# Patient Record
Sex: MALE | Race: BLACK OR AFRICAN AMERICAN | Employment: UNEMPLOYED | URBAN - METROPOLITAN AREA
[De-identification: names, ages, dates, MRNs, and addresses within clinical notes are randomized per-mention and may not be internally consistent; named-entity substitution may affect disease eponyms.]

---

## 2017-06-20 ENCOUNTER — APPOINTMENT (OUTPATIENT)
Dept: CT IMAGING | Age: 32
End: 2017-06-20
Attending: STUDENT IN AN ORGANIZED HEALTH CARE EDUCATION/TRAINING PROGRAM
Payer: MEDICAID

## 2017-06-20 ENCOUNTER — HOSPITAL ENCOUNTER (OUTPATIENT)
Age: 32
Setting detail: OBSERVATION
Discharge: HOME | End: 2017-06-22
Attending: STUDENT IN AN ORGANIZED HEALTH CARE EDUCATION/TRAINING PROGRAM | Admitting: FAMILY MEDICINE
Payer: MEDICAID

## 2017-06-20 DIAGNOSIS — N13.30 HYDRONEPHROSIS, UNSPECIFIED HYDRONEPHROSIS TYPE: ICD-10-CM

## 2017-06-20 DIAGNOSIS — R10.84 ABDOMINAL PAIN, GENERALIZED: Primary | ICD-10-CM

## 2017-06-20 DIAGNOSIS — R03.0 ELEVATED BLOOD PRESSURE READING: ICD-10-CM

## 2017-06-20 PROBLEM — I10 HYPERTENSION: Chronic | Status: ACTIVE | Noted: 2017-06-20

## 2017-06-20 PROBLEM — E23.2 DIABETES INSIPIDUS (HCC): Chronic | Status: ACTIVE | Noted: 2017-06-20

## 2017-06-20 PROBLEM — R10.9 ABDOMINAL PAIN: Status: ACTIVE | Noted: 2017-06-20

## 2017-06-20 LAB
ALBUMIN SERPL BCP-MCNC: 4.6 G/DL (ref 3.5–5)
ALBUMIN/GLOB SERPL: 1.1 {RATIO} (ref 1.1–2.2)
ALP SERPL-CCNC: 89 U/L (ref 45–117)
ALT SERPL-CCNC: 29 U/L (ref 12–78)
AMPHET UR QL SCN: NEGATIVE
ANION GAP BLD CALC-SCNC: 8 MMOL/L (ref 5–15)
APPEARANCE UR: ABNORMAL
AST SERPL W P-5'-P-CCNC: 26 U/L (ref 15–37)
BACTERIA URNS QL MICRO: NEGATIVE /HPF
BARBITURATES UR QL SCN: NEGATIVE
BASOPHILS # BLD AUTO: 0 K/UL (ref 0–0.1)
BASOPHILS # BLD: 0 % (ref 0–1)
BENZODIAZ UR QL: NEGATIVE
BILIRUB SERPL-MCNC: 0.5 MG/DL (ref 0.2–1)
BILIRUB UR QL: NEGATIVE
BUN SERPL-MCNC: 10 MG/DL (ref 6–20)
BUN/CREAT SERPL: 9 (ref 12–20)
CALCIUM SERPL-MCNC: 9.6 MG/DL (ref 8.5–10.1)
CANNABINOIDS UR QL SCN: NEGATIVE
CHLORIDE SERPL-SCNC: 113 MMOL/L (ref 97–108)
CO2 SERPL-SCNC: 27 MMOL/L (ref 21–32)
COCAINE UR QL SCN: NEGATIVE
COLOR UR: ABNORMAL
CREAT SERPL-MCNC: 1.15 MG/DL (ref 0.7–1.3)
DRUG SCRN COMMENT,DRGCM: NORMAL
EOSINOPHIL # BLD: 0 K/UL (ref 0–0.4)
EOSINOPHIL NFR BLD: 1 % (ref 0–7)
EPITH CASTS URNS QL MICRO: ABNORMAL /LPF
ERYTHROCYTE [DISTWIDTH] IN BLOOD BY AUTOMATED COUNT: 13.4 % (ref 11.5–14.5)
GLOBULIN SER CALC-MCNC: 4.3 G/DL (ref 2–4)
GLUCOSE BLD STRIP.AUTO-MCNC: 119 MG/DL (ref 65–100)
GLUCOSE SERPL-MCNC: 113 MG/DL (ref 65–100)
GLUCOSE UR STRIP.AUTO-MCNC: NEGATIVE MG/DL
HCT VFR BLD AUTO: 49.4 % (ref 36.6–50.3)
HGB BLD-MCNC: 17 G/DL (ref 12.1–17)
HGB UR QL STRIP: NEGATIVE
KETONES UR QL STRIP.AUTO: NEGATIVE MG/DL
LACTATE SERPL-SCNC: 1.4 MMOL/L (ref 0.4–2)
LEUKOCYTE ESTERASE UR QL STRIP.AUTO: NEGATIVE
LIPASE SERPL-CCNC: 239 U/L (ref 73–393)
LYMPHOCYTES # BLD AUTO: 20 % (ref 12–49)
LYMPHOCYTES # BLD: 1.3 K/UL (ref 0.8–3.5)
MCH RBC QN AUTO: 31.3 PG (ref 26–34)
MCHC RBC AUTO-ENTMCNC: 34.4 G/DL (ref 30–36.5)
MCV RBC AUTO: 91 FL (ref 80–99)
METHADONE UR QL: NEGATIVE
MONOCYTES # BLD: 0.2 K/UL (ref 0–1)
MONOCYTES NFR BLD AUTO: 3 % (ref 5–13)
NEUTS SEG # BLD: 5.1 K/UL (ref 1.8–8)
NEUTS SEG NFR BLD AUTO: 76 % (ref 32–75)
NITRITE UR QL STRIP.AUTO: NEGATIVE
OPIATES UR QL: NEGATIVE
PCP UR QL: NEGATIVE
PH UR STRIP: 7 [PH] (ref 5–8)
PLATELET # BLD AUTO: 281 K/UL (ref 150–400)
POTASSIUM SERPL-SCNC: 3.3 MMOL/L (ref 3.5–5.1)
PROT SERPL-MCNC: 8.9 G/DL (ref 6.4–8.2)
PROT UR STRIP-MCNC: NEGATIVE MG/DL
RBC # BLD AUTO: 5.43 M/UL (ref 4.1–5.7)
RBC #/AREA URNS HPF: ABNORMAL /HPF (ref 0–5)
SERVICE CMNT-IMP: ABNORMAL
SODIUM SERPL-SCNC: 148 MMOL/L (ref 136–145)
SP GR UR REFRACTOMETRY: 1 (ref 1–1.03)
UROBILINOGEN UR QL STRIP.AUTO: 0.2 EU/DL (ref 0.2–1)
WBC # BLD AUTO: 6.6 K/UL (ref 4.1–11.1)
WBC URNS QL MICRO: ABNORMAL /HPF (ref 0–4)

## 2017-06-20 PROCEDURE — 85025 COMPLETE CBC W/AUTO DIFF WBC: CPT | Performed by: STUDENT IN AN ORGANIZED HEALTH CARE EDUCATION/TRAINING PROGRAM

## 2017-06-20 PROCEDURE — 96376 TX/PRO/DX INJ SAME DRUG ADON: CPT

## 2017-06-20 PROCEDURE — 96375 TX/PRO/DX INJ NEW DRUG ADDON: CPT

## 2017-06-20 PROCEDURE — 99218 HC RM OBSERVATION: CPT

## 2017-06-20 PROCEDURE — 81003 URINALYSIS AUTO W/O SCOPE: CPT | Performed by: STUDENT IN AN ORGANIZED HEALTH CARE EDUCATION/TRAINING PROGRAM

## 2017-06-20 PROCEDURE — 83605 ASSAY OF LACTIC ACID: CPT | Performed by: STUDENT IN AN ORGANIZED HEALTH CARE EDUCATION/TRAINING PROGRAM

## 2017-06-20 PROCEDURE — 83690 ASSAY OF LIPASE: CPT | Performed by: STUDENT IN AN ORGANIZED HEALTH CARE EDUCATION/TRAINING PROGRAM

## 2017-06-20 PROCEDURE — 74011636320 HC RX REV CODE- 636/320

## 2017-06-20 PROCEDURE — 96361 HYDRATE IV INFUSION ADD-ON: CPT

## 2017-06-20 PROCEDURE — 80307 DRUG TEST PRSMV CHEM ANLYZR: CPT | Performed by: STUDENT IN AN ORGANIZED HEALTH CARE EDUCATION/TRAINING PROGRAM

## 2017-06-20 PROCEDURE — 80053 COMPREHEN METABOLIC PANEL: CPT | Performed by: STUDENT IN AN ORGANIZED HEALTH CARE EDUCATION/TRAINING PROGRAM

## 2017-06-20 PROCEDURE — 74011000250 HC RX REV CODE- 250: Performed by: NURSE PRACTITIONER

## 2017-06-20 PROCEDURE — 36415 COLL VENOUS BLD VENIPUNCTURE: CPT | Performed by: STUDENT IN AN ORGANIZED HEALTH CARE EDUCATION/TRAINING PROGRAM

## 2017-06-20 PROCEDURE — 99285 EMERGENCY DEPT VISIT HI MDM: CPT

## 2017-06-20 PROCEDURE — 74174 CTA ABD&PLVS W/CONTRAST: CPT

## 2017-06-20 PROCEDURE — 51798 US URINE CAPACITY MEASURE: CPT

## 2017-06-20 PROCEDURE — 82962 GLUCOSE BLOOD TEST: CPT

## 2017-06-20 PROCEDURE — 74011250636 HC RX REV CODE- 250/636: Performed by: FAMILY MEDICINE

## 2017-06-20 PROCEDURE — 96374 THER/PROPH/DIAG INJ IV PUSH: CPT

## 2017-06-20 PROCEDURE — 74011250637 HC RX REV CODE- 250/637: Performed by: FAMILY MEDICINE

## 2017-06-20 PROCEDURE — 74011250636 HC RX REV CODE- 250/636: Performed by: STUDENT IN AN ORGANIZED HEALTH CARE EDUCATION/TRAINING PROGRAM

## 2017-06-20 RX ORDER — ALPRAZOLAM 0.25 MG/1
0.25 TABLET ORAL
COMMUNITY
End: 2017-12-04

## 2017-06-20 RX ORDER — HYDRALAZINE HYDROCHLORIDE 20 MG/ML
10 INJECTION INTRAMUSCULAR; INTRAVENOUS
Status: DISCONTINUED | OUTPATIENT
Start: 2017-06-20 | End: 2017-06-22 | Stop reason: HOSPADM

## 2017-06-20 RX ORDER — POTASSIUM CHLORIDE 750 MG/1
20 TABLET, FILM COATED, EXTENDED RELEASE ORAL
Status: ACTIVE | OUTPATIENT
Start: 2017-06-20 | End: 2017-06-21

## 2017-06-20 RX ORDER — DESMOPRESSIN ACETATE 0.1 MG/1
0.1 TABLET ORAL DAILY
COMMUNITY
End: 2017-12-04

## 2017-06-20 RX ORDER — DESMOPRESSIN ACETATE 0.1 MG/1
0.1 TABLET ORAL DAILY
Status: DISCONTINUED | OUTPATIENT
Start: 2017-06-21 | End: 2017-06-22 | Stop reason: HOSPADM

## 2017-06-20 RX ORDER — SODIUM CHLORIDE 0.9 % (FLUSH) 0.9 %
10 SYRINGE (ML) INJECTION
Status: ACTIVE | OUTPATIENT
Start: 2017-06-20 | End: 2017-06-21

## 2017-06-20 RX ORDER — ONDANSETRON 2 MG/ML
4 INJECTION INTRAMUSCULAR; INTRAVENOUS
Status: DISCONTINUED | OUTPATIENT
Start: 2017-06-20 | End: 2017-06-22 | Stop reason: HOSPADM

## 2017-06-20 RX ORDER — DIPHENHYDRAMINE HYDROCHLORIDE 50 MG/ML
12.5 INJECTION, SOLUTION INTRAMUSCULAR; INTRAVENOUS
Status: DISCONTINUED | OUTPATIENT
Start: 2017-06-20 | End: 2017-06-22 | Stop reason: HOSPADM

## 2017-06-20 RX ORDER — HYDROMORPHONE HYDROCHLORIDE 1 MG/ML
2 INJECTION, SOLUTION INTRAMUSCULAR; INTRAVENOUS; SUBCUTANEOUS ONCE
Status: COMPLETED | OUTPATIENT
Start: 2017-06-20 | End: 2017-06-20

## 2017-06-20 RX ORDER — HYDROMORPHONE HYDROCHLORIDE 1 MG/ML
0.5 INJECTION, SOLUTION INTRAMUSCULAR; INTRAVENOUS; SUBCUTANEOUS
Status: DISCONTINUED | OUTPATIENT
Start: 2017-06-20 | End: 2017-06-21

## 2017-06-20 RX ORDER — ALBUTEROL SULFATE 90 UG/1
2 AEROSOL, METERED RESPIRATORY (INHALATION)
COMMUNITY

## 2017-06-20 RX ORDER — METOPROLOL TARTRATE 5 MG/5ML
5 INJECTION INTRAVENOUS
Status: DISCONTINUED | OUTPATIENT
Start: 2017-06-20 | End: 2017-06-22 | Stop reason: HOSPADM

## 2017-06-20 RX ORDER — DOXAZOSIN 2 MG/1
2 TABLET ORAL
Status: DISCONTINUED | OUTPATIENT
Start: 2017-06-20 | End: 2017-06-22

## 2017-06-20 RX ORDER — ALPRAZOLAM 0.25 MG/1
0.25 TABLET ORAL
Status: DISCONTINUED | OUTPATIENT
Start: 2017-06-20 | End: 2017-06-22 | Stop reason: HOSPADM

## 2017-06-20 RX ORDER — METOPROLOL SUCCINATE 100 MG/1
100 TABLET, EXTENDED RELEASE ORAL DAILY
COMMUNITY

## 2017-06-20 RX ORDER — ONDANSETRON 4 MG/1
8 TABLET, ORALLY DISINTEGRATING ORAL
Status: DISCONTINUED | OUTPATIENT
Start: 2017-06-20 | End: 2017-06-22 | Stop reason: HOSPADM

## 2017-06-20 RX ORDER — DOXAZOSIN 2 MG/1
2 TABLET ORAL
COMMUNITY
End: 2017-12-04

## 2017-06-20 RX ORDER — HYDROCODONE BITARTRATE AND ACETAMINOPHEN 5; 325 MG/1; MG/1
1 TABLET ORAL
Status: DISCONTINUED | OUTPATIENT
Start: 2017-06-20 | End: 2017-06-22 | Stop reason: HOSPADM

## 2017-06-20 RX ORDER — ALBUTEROL SULFATE 0.83 MG/ML
2.5 SOLUTION RESPIRATORY (INHALATION)
Status: DISCONTINUED | OUTPATIENT
Start: 2017-06-20 | End: 2017-06-22 | Stop reason: HOSPADM

## 2017-06-20 RX ORDER — AMLODIPINE BESYLATE 10 MG/1
10 TABLET ORAL DAILY
COMMUNITY

## 2017-06-20 RX ORDER — ALBUTEROL SULFATE 90 UG/1
2 AEROSOL, METERED RESPIRATORY (INHALATION)
Status: DISCONTINUED | OUTPATIENT
Start: 2017-06-20 | End: 2017-06-20 | Stop reason: SDUPTHER

## 2017-06-20 RX ORDER — ACETAMINOPHEN 325 MG/1
650 TABLET ORAL
Status: DISCONTINUED | OUTPATIENT
Start: 2017-06-20 | End: 2017-06-22 | Stop reason: HOSPADM

## 2017-06-20 RX ORDER — ONDANSETRON 8 MG/1
8 TABLET, ORALLY DISINTEGRATING ORAL
COMMUNITY
End: 2017-12-04

## 2017-06-20 RX ORDER — HYDROCODONE BITARTRATE AND ACETAMINOPHEN 5; 325 MG/1; MG/1
1 TABLET ORAL
Status: DISCONTINUED | OUTPATIENT
Start: 2017-06-20 | End: 2017-06-20 | Stop reason: SDUPTHER

## 2017-06-20 RX ORDER — SODIUM CHLORIDE 0.9 % (FLUSH) 0.9 %
5-10 SYRINGE (ML) INJECTION EVERY 8 HOURS
Status: DISCONTINUED | OUTPATIENT
Start: 2017-06-20 | End: 2017-06-22 | Stop reason: HOSPADM

## 2017-06-20 RX ORDER — ADHESIVE BANDAGE
30 BANDAGE TOPICAL DAILY PRN
Status: DISCONTINUED | OUTPATIENT
Start: 2017-06-20 | End: 2017-06-22 | Stop reason: HOSPADM

## 2017-06-20 RX ORDER — AMLODIPINE BESYLATE 5 MG/1
10 TABLET ORAL DAILY
Status: DISCONTINUED | OUTPATIENT
Start: 2017-06-21 | End: 2017-06-22 | Stop reason: HOSPADM

## 2017-06-20 RX ORDER — HYDROCODONE BITARTRATE AND ACETAMINOPHEN 5; 325 MG/1; MG/1
1 TABLET ORAL
COMMUNITY
End: 2017-12-04

## 2017-06-20 RX ORDER — SODIUM CHLORIDE 0.9 % (FLUSH) 0.9 %
5-10 SYRINGE (ML) INJECTION AS NEEDED
Status: DISCONTINUED | OUTPATIENT
Start: 2017-06-20 | End: 2017-06-22 | Stop reason: HOSPADM

## 2017-06-20 RX ORDER — METOPROLOL SUCCINATE 50 MG/1
100 TABLET, EXTENDED RELEASE ORAL DAILY
Status: DISCONTINUED | OUTPATIENT
Start: 2017-06-21 | End: 2017-06-22 | Stop reason: HOSPADM

## 2017-06-20 RX ADMIN — HYDROMORPHONE HYDROCHLORIDE 0.5 MG: 1 INJECTION, SOLUTION INTRAMUSCULAR; INTRAVENOUS; SUBCUTANEOUS at 19:50

## 2017-06-20 RX ADMIN — DOXAZOSIN 2 MG: 2 TABLET ORAL at 22:26

## 2017-06-20 RX ADMIN — HYDROMORPHONE HYDROCHLORIDE 2 MG: 1 INJECTION, SOLUTION INTRAMUSCULAR; INTRAVENOUS; SUBCUTANEOUS at 12:29

## 2017-06-20 RX ADMIN — METOPROLOL TARTRATE 5 MG: 5 INJECTION INTRAVENOUS at 22:28

## 2017-06-20 RX ADMIN — Medication 10 ML: at 22:00

## 2017-06-20 RX ADMIN — SODIUM CHLORIDE 1000 ML: 900 INJECTION, SOLUTION INTRAVENOUS at 12:19

## 2017-06-20 RX ADMIN — ONDANSETRON 4 MG: 2 INJECTION INTRAMUSCULAR; INTRAVENOUS at 22:28

## 2017-06-20 RX ADMIN — HYDROCODONE BITARTRATE AND ACETAMINOPHEN 1 TABLET: 5; 325 TABLET ORAL at 22:25

## 2017-06-20 RX ADMIN — HYDRALAZINE HYDROCHLORIDE 10 MG: 20 INJECTION INTRAMUSCULAR; INTRAVENOUS at 19:50

## 2017-06-20 RX ADMIN — ALPRAZOLAM 0.25 MG: 0.25 TABLET ORAL at 22:25

## 2017-06-20 RX ADMIN — HYDROMORPHONE HYDROCHLORIDE 2 MG: 1 INJECTION, SOLUTION INTRAMUSCULAR; INTRAVENOUS; SUBCUTANEOUS at 14:09

## 2017-06-20 NOTE — PROGRESS NOTES
Admission Medication Reconciliation:    Information obtained from: [facility], [facility], [facility] (visit 6/19/17)    Spoke with pharmacy staff at [facility], [facility], and [facility] [place].  Added doxazosin, Toprol XL, desmopressin, albuterol, amlodipine, diphenhydramine, alprazolam  Removed methocarbamol and naproxen. Significant PMH/Disease States:   Past Medical History:   Diagnosis Date    Diabetes ([address]     Hypertension        Chief Complaint for this Admission:  abdominal pain    Allergies:  Review of patient's allergies indicates no known allergies. Prior to Admission Medications:   Prior to Admission Medications   Prescriptions Last Dose Informant Patient Reported? Taking? ALPRAZolam (XANAX) 0.25 mg tablet   Yes Yes   Sig: Take 0.25 mg by mouth three (3) times daily as needed for Anxiety. HYDROcodone-acetaminophen (NORCO) 5-325 mg per tablet   Yes Yes   Sig: Take 1 Tab by mouth every six (6) hours as needed for Pain. albuterol (PROVENTIL HFA, VENTOLIN HFA, PROAIR HFA) 90 mcg/actuation inhaler   Yes Yes   Sig: Take 2 Puffs by inhalation every six (6) hours as needed for Wheezing or Shortness of Breath. amLODIPine (NORVASC) 10 mg tablet   Yes Yes   Sig: Take 10 mg by mouth daily. desmopressin (DDAVP) 0.1 mg tablet   Yes Yes   Sig: Take 0.1 mg by mouth daily. doxazosin (CARDURA) 2 mg tablet   Yes Yes   Sig: Take 2 mg by mouth nightly. metoprolol succinate (TOPROL XL) 100 mg tablet   Yes Yes   Sig: Take 100 mg by mouth daily. ondansetron (ZOFRAN ODT) 8 mg disintegrating tablet   Yes Yes   Sig: Take 8 mg by mouth every eight (8) hours as needed for Nausea. Facility-Administered Medications: None       Comments/Recommendations: Medication review attempted with patient.   He did not have medication names but suggested calling his pharmacy or [facility] hospital.  Spoke with pharmacy staff at [facility], [facility], and [facility] [place].  Added doxazosin, Toprol XL, desmopressin, albuterol, amlodipine, diphenhydramine, alprazolam  Removed methocarbamol and naproxen.

## 2017-06-20 NOTE — ED PROVIDER NOTES
HPI Comments: 36 y.o. male with past medical history significant for diabetes and HTN who presents via EMS with chief complaint of abdominal pain. Pt states that he has had upper abdominal pain for the last 4 days. He has been seen at [facility], and [facility] for the same complaint and their findings were negative. Pt states that none of them did an abdominal CT. Pt has been nauseous. He denies any change in BM, change in urine, vomiting, or abdominal surgery. Pt has been taking Lortab with minimal relief. Pt states he had similar pain like this 1 year ago but not as extreme. There are no other acute medical concerns at this time. Social hx: Pt denies drug use, tobacco use, alcohol use  PCP: Karina Sanchez MD    Note written by Pepe Bland, as dictated by Kenya Mulligan MD 12:21 PM      The history is provided by the patient. No  was used. Past Medical History:   Diagnosis Date    Diabetes ([address]     Hypertension        History reviewed. No pertinent surgical history. History reviewed. No pertinent family history. Social History     Social History    Marital status: SINGLE     Spouse name: N/A    Number of children: N/A    Years of education: N/A     Occupational History    Not on file. Social History Main Topics    Smoking status: Current Some Day Smoker    Smokeless tobacco: Not on file    Alcohol use No    Drug use: No    Sexual activity: Not on file     Other Topics Concern    Not on file     Social History Narrative         ALLERGIES: Review of patient's allergies indicates no known allergies. Review of Systems   Gastrointestinal: Positive for abdominal pain and nausea. Negative for constipation and vomiting. Genitourinary: Negative for difficulty urinating. All other systems reviewed and are negative.       Vitals:    06/20/17 1208   BP: (!) 190/119   Pulse: 73   Resp: 16   Temp: 98.8 °F (37.1 °C)   SpO2: 99%   Weight: 83.9 kg (185 lb)   Height: 5' 9\" (1.753 m)            Physical Exam   Constitutional: He is oriented to person, place, and time. He appears well-developed. He appears distressed. Pt writhing in pain   HENT:   Head: Normocephalic and atraumatic. Eyes: Conjunctivae and EOM are normal.   Neck: Normal range of motion. Neck supple. Cardiovascular: Normal rate, regular rhythm and normal heart sounds. No murmur heard. Pulmonary/Chest: Effort normal and breath sounds normal. No respiratory distress. Abdominal: Soft. Bowel sounds are normal. He exhibits no distension. There is no rebound and no guarding. Mild diffuse abdominal tenderness   Musculoskeletal: Normal range of motion. He exhibits no edema or tenderness. Neurological: He is alert and oriented to person, place, and time. No cranial nerve deficit. He exhibits normal muscle tone. Coordination normal.   Skin: Skin is warm and dry. Nursing note and vitals reviewed.    Note written by Pepe Rincon, as dictated by Benita Rao MD 12:32 PM      MDM  Number of Diagnoses or Management Options  Abdominal pain, generalized:      Amount and/or Complexity of Data Reviewed  Clinical lab tests: ordered and reviewed  Tests in the radiology section of CPT®: ordered    Risk of Complications, Morbidity, and/or Mortality  Presenting problems: moderate  Diagnostic procedures: moderate  Management options: moderate    Patient Progress  Patient progress: stable    ED Course       Procedures

## 2017-06-20 NOTE — ED NOTES
Pt at [address]    1409  Pt sent back to room from CT r/t poor IV access. 18 G in R AC flushed without difficulty and positive blood return noted. CT called for pt pickup.

## 2017-06-20 NOTE — H&P
Sidney Rice MD  Please call  and page for questions. Call physician on-call through the  7pm-7am      History & Physical    Primary Care Provider: Luis Alfredo Puga MD  Source of Information: Patient and his medical records. History of Presenting Illness:   Keny Maldonado is a 36 y.o. male with the PMH of HTN, diabetes insipidus, who came to the ED today with abdominal pain. Patient said it has been going on for four days. Patient was also nauseated and started vomiting this AM. It is being hard for the patient to put don anything due to nausea and vomiting. He had normal regular BM yesterday. He has been seen at [facility], [address] [place] [address], and [facility] for the same complaint and their findings were negative. He said he never had this much of pain before though. He was told that his right kidney is not working. He denied change in urine and adominal surgery. He said he makes a lot of urine. He took Lortab with minimal relief. The patient denies any fever, chills, chest pain, cough, congestion, recent illness, palpitations, or dysuria. He denied headache, vision problems, weakness. He denied dizziness and lightheadedness. Review of Systems:  A comprehensive review of systems was negative except for that written in the History of Present Illness. Past Medical History:   Diagnosis Date    Diabetes ([address]     Hypertension       History reviewed. No pertinent surgical history. Prior to Admission medications    Medication Sig Start Date End Date Taking? Authorizing Provider   methocarbamol (ROBAXIN) 500 mg tablet Take 1 Tab by mouth four (4) times daily. 10/22/16   Maite Roger NP   naproxen (NAPROSYN) 375 mg tablet Take 1 Tab by mouth two (2) times daily (with meals). 10/22/16   Maite Roger NP     No Known Allergies   History reviewed. No pertinent family history.      SOCIAL HISTORY:  Patient resides:  Independently X   Assisted Living    SNF    With family care       Smoking history:   None    Former    Chronic 1 or 2 cigerettes daily. Alcohol history:   None    Social X   Chronic      Ambulates:   Independently X   w/cane    w/walker    w/wc    CODE STATUS:  DNR    Full X   Other      Objective:     Physical Exam:     Visit Vitals    BP (!) 190/97 (BP 1 Location: Left arm, BP Patient Position: Sitting)    Pulse 99    Temp 97.8 °F (36.6 °C)    Resp 14    Ht 5' 9\" (1.753 m)    Wt 83.9 kg (185 lb)    SpO2 99%    BMI 27.32 kg/m2      O2 Device: Room air    General:  Alert, cooperative, no distress, appears stated age. Head:  Normocephalic, without obvious abnormality, atraumatic. Eyes:  Conjunctivae/corneas clear. PERRL, EOMs intact. Throat: Lips, mucosa, and tongue normal. Teeth and gums normal.   Neck: Supple, symmetrical, trachea midline, no adenopathy, thyroid: no enlargement/tenderness/nodules, no carotid bruit and no JVD. Back:   Symmetric, no curvature. ROM normal. No CVA tenderness. Lungs:   Clear to auscultation bilaterally. Heart:  Regular rate and rhythm, S1, S2 normal, no murmur. Abdomen:   Soft, non-tender. Bowel sounds normal.   Extremities: Extremities normal, atraumatic, no cyanosis or edema. Pulses: 2+ and symmetric all extremities. Skin: Skin color, texture, turgor normal. No rashes or lesions   Neurologic: CNII-XII intact. Data Review: CT report and lab. Recent Days:  Recent Labs      06/20/17   1230   WBC  6.6   HGB  17.0   HCT  49.4   PLT  281     Recent Labs      06/20/17   1230   NA  148*   K  3.3*   CL  113*   CO2  27   GLU  113*   BUN  10   CREA  1.15   CA  9.6   ALB  4.6   SGOT  26   ALT  29     No results for input(s): PH, PCO2, PO2, HCO3, FIO2 in the last 72 hours.     24 Hour Results:  Recent Results (from the past 24 hour(s))   CBC WITH AUTOMATED DIFF    Collection Time: 06/20/17 12:30 PM   Result Value Ref Range    WBC 6.6 4.1 - 11.1 K/uL    RBC 5.43 4.10 - 5.70 M/uL    HGB 17.0 12.1 - 17.0 g/dL    HCT 49.4 36.6 - 50.3 %    MCV 91.0 80.0 - 99.0 FL    MCH 31.3 26.0 - 34.0 PG    MCHC 34.4 30.0 - 36.5 g/dL    RDW 13.4 11.5 - 14.5 %    PLATELET 631 565 - 696 K/uL    NEUTROPHILS 76 (H) 32 - 75 %    LYMPHOCYTES 20 12 - 49 %    MONOCYTES 3 (L) 5 - 13 %    EOSINOPHILS 1 0 - 7 %    BASOPHILS 0 0 - 1 %    ABS. NEUTROPHILS 5.1 1.8 - 8.0 K/UL    ABS. LYMPHOCYTES 1.3 0.8 - 3.5 K/UL    ABS. MONOCYTES 0.2 0.0 - 1.0 K/UL    ABS. EOSINOPHILS 0.0 0.0 - 0.4 K/UL    ABS. BASOPHILS 0.0 0.0 - 0.1 K/UL   METABOLIC PANEL, COMPREHENSIVE    Collection Time: 06/20/17 12:30 PM   Result Value Ref Range    Sodium 148 (H) 136 - 145 mmol/L    Potassium 3.3 (L) 3.5 - 5.1 mmol/L    Chloride 113 (H) 97 - 108 mmol/L    CO2 27 21 - 32 mmol/L    Anion gap 8 5 - 15 mmol/L    Glucose 113 (H) 65 - 100 mg/dL    BUN 10 6 - 20 MG/DL    Creatinine 1.15 0.70 - 1.30 MG/DL    BUN/Creatinine ratio 9 (L) 12 - 20      GFR est AA >60 >60 ml/min/1.73m2    GFR est non-AA >60 >60 ml/min/1.73m2    Calcium 9.6 8.5 - 10.1 MG/DL    Bilirubin, total 0.5 0.2 - 1.0 MG/DL    ALT (SGPT) 29 12 - 78 U/L    AST (SGOT) 26 15 - 37 U/L    Alk.  phosphatase 89 45 - 117 U/L    Protein, total 8.9 (H) 6.4 - 8.2 g/dL    Albumin 4.6 3.5 - 5.0 g/dL    Globulin 4.3 (H) 2.0 - 4.0 g/dL    A-G Ratio 1.1 1.1 - 2.2     LIPASE    Collection Time: 06/20/17 12:30 PM   Result Value Ref Range    Lipase 239 73 - 393 U/L   URINALYSIS W/ RFLX MICROSCOPIC    Collection Time: 06/20/17 12:30 PM   Result Value Ref Range    Color YELLOW/STRAW      Appearance CLOUDY (A) CLEAR      Specific gravity 1.005 1.003 - 1.030      pH (UA) 7.0 5.0 - 8.0      Protein NEGATIVE  NEG mg/dL    Glucose NEGATIVE  NEG mg/dL    Ketone NEGATIVE  NEG mg/dL    Bilirubin NEGATIVE  NEG      Blood NEGATIVE  NEG      Urobilinogen 0.2 0.2 - 1.0 EU/dL    Nitrites NEGATIVE  NEG      Leukocyte Esterase NEGATIVE  NEG      WBC 0-4 0 - 4 /hpf    RBC 0-5 0 - 5 /hpf    Epithelial cells FEW FEW /lpf    Bacteria NEGATIVE  NEG /hpf   DRUG SCREEN, URINE    Collection Time: 06/20/17 12:30 PM   Result Value Ref Range    AMPHETAMINE NEGATIVE  NEG      BARBITURATES NEGATIVE  NEG      BENZODIAZEPINE NEGATIVE  NEG      COCAINE NEGATIVE  NEG      METHADONE NEGATIVE  NEG      OPIATES NEGATIVE  NEG      PCP(PHENCYCLIDINE) NEGATIVE  NEG      THC (TH-CANNABINOL) NEGATIVE  NEG      Drug screen comment (NOTE)    LACTIC ACID, PLASMA    Collection Time: 06/20/17 12:30 PM   Result Value Ref Range    Lactic acid 1.4 0.4 - 2.0 MMOL/L         Imaging:     Assessment and Plan:       Abdominal pain with vomiting:   Probably due to moderately severe right hydroureteronephrosis with thinning of the  upper pole and bladder distension. Patient is voiding without any problems. We admit the patient for observation to the medical floor and treat symptoms. Patient has the PMH of bad right kidney but he does not know what it is. Patient said he was on Gonzalez cathter for bladder obstruction in the past. We will consult urology and follow. Patient said he went to urologist about 2 years ago. Diabetes insipidus:   Will continue his home medication. I discussed the case with nephrologist. His 6 YO daughter has DI as well. Hypertension: Elevated. Will continue his home medication and add hydralazine as PRN. Electrolytes abnormalities: Will add potassium and follow lab at AM. Mild hypernatremia with DI. See orders for other plans:   VTE prophylaxis: Heparin  Code status: Full  Discussed plan of care with Patient/Family and Nurse   Pre-admission lived at home:   Disposition: To the telemetry for observation. Discharge planning: pending.            Signed By: Griselda Tate MD     June 20, 2017

## 2017-06-20 NOTE — ED TRIAGE NOTES
Pt arrived via EMS from home c/o abd pain since Friday, been seen at [facility] and [facility] with no abnormalities. MCV placed a Gonzalez and it was removed at [facility].

## 2017-06-20 NOTE — ED NOTES
Pt requesting pain medication, MD made aware. 1630  Pt given water by ED staff.     5:19 PM  Dr. Kavita Bowser at bedside.

## 2017-06-20 NOTE — ROUTINE PROCESS
TRANSFER - OUT REPORT:    Verbal report given to Sharon CARMONA ame) on Bertha Steele  being transferred to 67(unit) for routine progression of care       Report consisted of patients Situation, Background, Assessment and   Recommendations(SBAR). Information from the following report(s) SBAR, ED Summary, Intake/Output, MAR and Recent Results was reviewed with the receiving nurse. Lines:   Peripheral IV 06/20/17 Left Hand (Active)   Site Assessment Clean, dry, & intact 6/20/2017 12:20 PM   Phlebitis Assessment 0 6/20/2017 12:20 PM   Infiltration Assessment 0 6/20/2017 12:20 PM   Dressing Status Clean, dry, & intact 6/20/2017 12:20 PM   Dressing Type Transparent 6/20/2017 12:20 PM   Hub Color/Line Status Patent; Flushed 6/20/2017 12:20 PM   Action Taken Blood drawn 6/20/2017 12:20 PM       Peripheral IV 06/20/17 Right Antecubital (Active)        Opportunity for questions and clarification was provided.       Patient transported with:   [employer]

## 2017-06-20 NOTE — IP AVS SNAPSHOT
[address] [facility] 
[phone] Patient: Javier Brown MRN: [id number] :1985 You are allergic to the following No active allergies Recent Documentation Height Weight BMI Smoking Status 1.753 m 89.4 kg 29.09 kg/m2 Current Some Day Smoker Emergency Contacts Name Discharge Info Relation Home Work [facility] DISCHARGE CAREGIVER [3] Girlfriend [18] [phone] About your hospitalization You were admitted on:  2017 You last received care in the:  [facility] 6S NEURO-SCI TELE You were discharged on:  2017 Unit phone number:  [phone] Why you were hospitalized Your primary diagnosis was:  Abdominal Pain Your diagnoses also included:  Hypertension, Diabetes Insipidus (Hcc), Gastritis, Bladder Dysfunction, Hydronephrosis, Right, Hypernatremia Providers Seen During Your Hospitalizations Provider Role Specialty Primary office phone Cheli London MD Attending Provider Emergency Medicine [phone] Rony Colbert MD Attending Provider [facility] [phone] Bonnie Santiago MD Attending Provider Hospitalist [phone] Frann Sandifer, MD Attending Provider Internal Medicine [phone] Your Primary Care Physician (PCP) Primary Care Physician Office Phone Office Fax [facility] [phone] Follow-up Information Follow up With Details Comments Contact Info Stacie Lacey MD In 1 week hospital follow up [address] [place] [address] 
[phone] Larry Mccall MD In 2 weeks GI; hospital follow up  [address] [address] [address] 
[phone] [facility] In 1 week Urology; hospital follow up [address] [facility] [address] 
[phone] Current Discharge Medication List  
  
START taking these medications Dose & Instructions Dispensing Information Comments Morning Noon Evening Bedtime  
 esomeprazole magnesium 20 mg Tbec Commonly known as:  NexIUM 24HR Your last dose was: Your next dose is:    
   
   
 Dose:  20 mg Take 20 mg by mouth daily. Indications: gastritis Quantity:  30 Tab Refills:  0  
     
   
   
   
  
 nicotine 21 mg/24 hr  
Commonly known as:  Jeoffrey Severs Your last dose was: Your next dose is:    
   
   
 Dose:  1 Patch 1 Patch by TransDERmal route daily for 30 days. Indications: SMOKING CESSATION Quantity:  30 Patch Refills:  0 CONTINUE these medications which have NOT CHANGED Dose & Instructions Dispensing Information Comments Morning Noon Evening Bedtime  
 albuterol 90 mcg/actuation inhaler Commonly known as:  PROVENTIL HFA, VENTOLIN HFA, PROAIR HFA Your last dose was: Your next dose is:    
   
   
 Dose:  2 Puff Take 2 Puffs by inhalation every six (6) hours as needed for Wheezing or Shortness of Breath. Refills:  0 ALPRAZolam 0.25 mg tablet Commonly known as:  Lawyer Trotter Your last dose was: Your next dose is:    
   
   
 Dose:  0.25 mg Take 0.25 mg by mouth three (3) times daily as needed for Anxiety. Refills:  0  
     
   
   
   
  
 amLODIPine 10 mg tablet Commonly known as:  Gregorykrista Bruce Your last dose was: Your next dose is:    
   
   
 Dose:  10 mg Take 10 mg by mouth daily. Refills:  0  
     
   
   
   
  
 desmopressin 0.1 mg tablet Commonly known as:  DDAVP Your last dose was: Your next dose is:    
   
   
 Dose:  0.1 mg Take 0.1 mg by mouth daily. Refills:  0  
     
   
   
   
  
 doxazosin 2 mg tablet Commonly known as:  CARDURA Your last dose was: Your next dose is:    
   
   
 Dose:  2 mg Take 2 mg by mouth nightly. Refills:  0 HYDROcodone-acetaminophen 5-325 mg per tablet Commonly known as:  Simmie Blonder Your last dose was: Your next dose is:    
   
   
 Dose:  1 Tab Take 1 Tab by mouth every six (6) hours as needed for Pain. Refills:  0  
     
   
   
   
  
 ondansetron 8 mg disintegrating tablet Commonly known as:  ZOFRAN ODT Your last dose was: Your next dose is:    
   
   
 Dose:  8 mg Take 8 mg by mouth every eight (8) hours as needed for Nausea. Refills:  0  
     
   
   
   
  
 TOPROL  mg tablet Generic drug:  metoprolol succinate Your last dose was: Your next dose is:    
   
   
 Dose:  100 mg Take 100 mg by mouth daily. Refills:  0 Where to Get Your Medications Information on where to get these meds will be given to you by the nurse or doctor. ! Ask your nurse or doctor about these medications  
  esomeprazole magnesium 20 mg Tbec  
 nicotine 21 mg/24 hr  
  
  
 
  
  
Discharge Instructions You need to follow up with Urology regarding kidneys and bladder: [facility] ([phone]) or [address] [place] [address] Urology. ADDITIONAL CARE RECOMMENDATIONS:  
1. Take medication as prescribed. 2. Keep appointment(s) as recommended/scheduled. 3. Your PCP may want to refer you to Psychiatry due to your anxiety. DIET: Cardiac and GERD- appropriate diet ACTIVITY: Activity as tolerated WOUND CARE: none EQUIPMENT needed: none Esomeprazole (By mouth) Esomeprazole ([id number]) Treats gastroesophageal reflux disease (GERD) and a damaged esophagus. Also treats and helps prevent stomach ulcers. This medicine is a proton pump inhibitor (PPI). Brand Name(s): Esomeprazole Strontium, NexIUM, NexIUM 24HR There may be other brand names for this medicine. When This Medicine Should Not Be Used: This medicine is not right for everyone. Do not use it if you had an allergic reaction to esomeprazole or similar medicines. How to Use This Medicine:  
Delayed Release Capsule, Packet · Your doctor will tell you how much medicine to use. Do not use more than directed. Take all of the medicine in your prescription, even if you feel better after a few days. · Follow the instructions on the medicine label if you are using this medicine without a prescription. · Take this medicine at least 1 hour before a meal. 
· Delayed-release capsule: ¨ Swallow the capsule whole. Do not crush or chew it. ¨ If you cannot swallow the capsule, you may use open it and pour the medicine into 1 tablespoon of applesauce. Stir the mixture well and swallow it right away. Do not chew. Throw away any remaining mixture. · Packets:  
¨ 2.5- or 5-milligram (mg) packet: Mix with 1 teaspoon (5 milliliters (mL)) of water and stir well. ¨ 10-, 20-, or 40-mg packet: Mix with 1 tablespoon (15 mL) of water and stir well. ¨ Let the mixture thicken for 2 to 3 minutes. Stir once more, and drink within 30 minutes. Add more water to any leftover medicine and drink it right away. · Feeding tube: ¨ Capsule contents:  
§ Open the capsule and pour the contents into a 60-mL catheter-tipped syringe. Pour 50 mL of water into the syringe. Do not use any other liquids or syringes. § Place the syringe plunger back into the syringe and shake well for 15 seconds. Make sure no medicine is stuck in the tip of the syringe. Inject the medicine into the NG tube right away. § Use water to rinse any leftover medicine through the tube. ¨ Packet contents:  
§ 2.5- or 5-mg packets: Add 5 mL (1 teaspoon) of water into a catheter-tipped syringe. Pour the 2.5- or 5-mg packet into the syringe, shake well right away, and let the medicine thicken for 2 to 3 minutes. § 10-, 20-, or 40-mg packets: Add 15 mL (1 tablespoon) of water into a catheter-tipped syringe. Pour the 10-, 20-, or 40-mg packet into the syringe, shake well right away, and let the medicine thicken for 2 to 3 minutes. § Shake the thickened mixture once more, and inject the medicine into the tube within 30 minutes. § Refill the syringe with the same amount of water that was used in the mixture (5 or 15 mL). Shake it gently, and inject it into the tube to rinse any leftover medicine through the tube. ¨ This medicine should come with a Medication Guide. Ask your pharmacist for a copy if you do not have one. ¨ Missed dose: Take a dose as soon as you remember. If it is almost time for your next dose, wait until then and take a regular dose. Do not take extra medicine to make up for a missed dose. ¨ Store the medicine in a closed container at room temperature, away from heat, moisture, and direct light. Drugs and Foods to Avoid: Ask your doctor or pharmacist before using any other medicine, including over-the-counter medicines, vitamins, and herbal products. · Some foods and medicines can affect how esomeprazole works. Tell your doctor if you are using any of the following: ¨ Atazanavir, cilostazol, diazepam, digoxin, erlotinib, ketoconazole, methotrexate, mycophenolate mofetil, nelfinavir, rifampin, saquinavir, Mike's wort, tacrolimus, voriconazole ¨ Blood thinner (including clopidogrel or warfarin) ¨ Diuretic (water pill) ¨ Iron supplements Warnings While Using This Medicine: · Tell your doctor if you are pregnant or breastfeeding, or if you have kidney disease, liver disease, lupus, or osteoporosis. · This medicine may cause the following problems: ¨ Kidney problems ¨ Broken bones in the hip, wrist, or spine ¨ Low levels of vitamin B12 or magnesium in the blood · This medicine can cause diarrhea. Call your doctor if the diarrhea becomes severe, does not stop, or is bloody. Do not take any medicine to stop diarrhea until you have talked to your doctor. Diarrhea can occur 2 months or more after you stop taking this medicine.  
· Tell any doctor or dentist who treats you that you are using this medicine. This medicine may affect certain medical test results. · Your doctor will check your progress and the effects of this medicine at regular visits. Keep all appointments. · Keep all medicine out of the reach of children. Never share your medicine with anyone. Possible Side Effects While Using This Medicine:  
Call your doctor right away if you notice any of these side effects: · Allergic reaction: Itching or hives, swelling in your face or hands, swelling or tingling in your mouth or throat, chest tightness, trouble breathing · Blistering, peeling, red skin rash · Fast, pounding, or uneven heartbeat · Fever, joint pain, swelling in your body, unusual weight gain, changes in how much or how often you urinate · Joint pain, rash on your cheeks or arms that gets worse in the sun · Seizures, dizziness, muscle cramps, or twitching · Severe diarrhea that does not go away, fever · Stomach pain, nausea, vomiting, weight loss, bloody or black stools If you notice these less serious side effects, talk with your doctor:  
· Headache · Mild diarrhea, constipation, or gas If you notice other side effects that you think are caused by this medicine, tell your doctor. Call your doctor for medical advice about side effects. You may report side effects to FDA at 4-757-FDA-8962 © 2017 [facility] [place][note 2] Information is for End User's use only and may not be sold, redistributed or otherwise used for commercial purposes. The above information is an  only. It is not intended as medical advice for individual conditions or treatments. Talk to your doctor, nurse or pharmacist before following any medical regimen to see if it is safe and effective for you. 
  
 
 
Discharge Instructions Attachments/References GASTRITIS (ENGLISH) Discharge Orders None MyChart Announcement  We are excited to announce that we are making your provider's discharge notes available to you in EduSourced. You will see these notes when they are completed and signed by the physician that discharged you from your recent hospital stay. If you have any questions or concerns about any information you see in EduSourced, please call the Health Information Department where you were seen or reach out to your Primary Care Provider for more information about your plan of care. Introducing [facility]! [facility] introduces EduSourced patient portal. Now you can access parts of your medical record, email your doctor's office, and request medication refills online. 1. In your internet browser, go to [email or web address] 2. Click on the First Time User? Click Here link in the Sign In box. You will see the New Member Sign Up page. 3. Enter your EduSourced Access Code exactly as it appears below. You will not need to use this code after youve completed the sign-up process. If you do not sign up before the expiration date, you must request a new code. · [employer] Access Code: [email or web address] Expires: 9/19/2017  3:14 PM 
 
4. Enter the last four digits of your Social Security Number (xxxx) and Date of Birth (mm/dd/yyyy) as indicated and click Submit. You will be taken to the next sign-up page. 5. Create a EduSourced ID. This will be your EduSourced login ID and cannot be changed, so think of one that is secure and easy to remember. 6. Create a [employer] password. You can change your password at any time. 7. Enter your Password Reset Question and Answer. This can be used at a later time if you forget your password. 8. Enter your e-mail address. You will receive e-mail notification when new information is available in [address] 9. Click Sign Up. You can now view and download portions of your medical record. 10. Click the Download Summary menu link to download a portable copy of your medical information. If you have questions, please visit the Frequently Asked Questions section of the [employer] website. Remember, Fliplife is NOT to be used for urgent needs. For medical emergencies, dial 911. Now available from your iPhone and Android! General Information Please provide this summary of care documentation to your next provider. Patient Signature:  ____________________________________________________________ Date:  ____________________________________________________________  
  
Andres Cons Provider Signature:  ____________________________________________________________ Date:  ____________________________________________________________ More Information Gastritis: Care Instructions Your Care Instructions Gastritis is a sore and upset stomach. It happens when something irritates the stomach lining. Many things can cause it. These include an infection such as the flu or something you ate or drank. Medicines or a sore on the lining of the stomach (ulcer) also can cause it. Your belly may bloat and ache. You may belch, vomit, and feel sick to your stomach. You should be able to relieve the problem by taking medicine. And it may help to change your diet. If gastritis lasts, your doctor may prescribe medicine. Follow-up care is a key part of your treatment and safety. Be sure to make and go to all appointments, and call your doctor if you are having problems. It's also a good idea to know your test results and keep a list of the medicines you take. How can you care for yourself at home? · If your doctor prescribed antibiotics, take them as directed. Do not stop taking them just because you feel better. You need to take the full course of antibiotics. · Be safe with medicines. If your doctor prescribed medicine to decrease stomach acid, take it as directed. Call your doctor if you think you are having a problem with your medicine. · Do not take any other medicine, including over-the-counter pain relievers, without talking to your doctor first. 
· If your doctor recommends over-the-counter medicine to reduce stomach acid, such as Pepcid AC, Prilosec, Tagamet HB, or Zantac 75, follow the directions on the label. · Drink plenty of fluids (enough so that your urine is light yellow or clear like water) to prevent dehydration. Choose water and other caffeine-free clear liquids. If you have kidney, heart, or liver disease and have to limit fluids, talk with your doctor before you increase the amount of fluids you drink. · Limit how much alcohol you drink. · Avoid coffee, tea, cola drinks, chocolate, and other foods with caffeine. They increase stomach acid. When should you call for help? Call 911 anytime you think you may need emergency care. For example, call if: 
· You vomit blood or what looks like coffee grounds. · You pass maroon or very bloody stools. Call your doctor now or seek immediate medical care if: 
· You start breathing fast and have not produced urine in the last 8 hours. · You cannot keep fluids down. Watch closely for changes in your health, and be sure to contact your doctor if: 
· You do not get better as expected. Where can you learn more? Go to http://ramana-tere.info/. Enter [id number] in the search box to learn more about \"Gastritis: Care Instructions. \" Current as of: August 9, 2016 Content Version: 11.3 © 1529-7677 [email or web address]. Care instructions adapted under license by [employer] (which disclaims liability or warranty for this information). If you have questions about a medical condition or this instruction, always ask your healthcare professional. [address] any warranty or liability for your use of this information.

## 2017-06-20 NOTE — IP AVS SNAPSHOT
Current Discharge Medication List  
  
START taking these medications Dose & Instructions Dispensing Information Comments Morning Noon Evening Bedtime  
 esomeprazole magnesium 20 mg Tbec Commonly known as:  NexIUM 24HR Your last dose was: Your next dose is:    
   
   
 Dose:  20 mg Take 20 mg by mouth daily. Indications: gastritis Quantity:  30 Tab Refills:  0  
     
   
   
   
  
 nicotine 21 mg/24 hr  
Commonly known as:  Hank Pulido Your last dose was: Your next dose is:    
   
   
 Dose:  1 Patch 1 Patch by TransDERmal route daily for 30 days. Indications: SMOKING CESSATION Quantity:  30 Patch Refills:  0 CONTINUE these medications which have NOT CHANGED Dose & Instructions Dispensing Information Comments Morning Noon Evening Bedtime  
 albuterol 90 mcg/actuation inhaler Commonly known as:  PROVENTIL HFA, VENTOLIN HFA, PROAIR HFA Your last dose was: Your next dose is:    
   
   
 Dose:  2 Puff Take 2 Puffs by inhalation every six (6) hours as needed for Wheezing or Shortness of Breath. Refills:  0 ALPRAZolam 0.25 mg tablet Commonly known as:  Nacho Ellen Your last dose was: Your next dose is:    
   
   
 Dose:  0.25 mg Take 0.25 mg by mouth three (3) times daily as needed for Anxiety. Refills:  0  
     
   
   
   
  
 amLODIPine 10 mg tablet Commonly known as:  Margarita Talavera Your last dose was: Your next dose is:    
   
   
 Dose:  10 mg Take 10 mg by mouth daily. Refills:  0  
     
   
   
   
  
 desmopressin 0.1 mg tablet Commonly known as:  DDAVP Your last dose was: Your next dose is:    
   
   
 Dose:  0.1 mg Take 0.1 mg by mouth daily. Refills:  0  
     
   
   
   
  
 doxazosin 2 mg tablet Commonly known as:  CARDURA Your last dose was: Your next dose is:    
   
   
 Dose:  2 mg Take 2 mg by mouth nightly. Refills:  0 HYDROcodone-acetaminophen 5-325 mg per tablet Commonly known as:  Pauline Schlichter Your last dose was: Your next dose is:    
   
   
 Dose:  1 Tab Take 1 Tab by mouth every six (6) hours as needed for Pain. Refills:  0  
     
   
   
   
  
 ondansetron 8 mg disintegrating tablet Commonly known as:  ZOFRAN ODT Your last dose was: Your next dose is:    
   
   
 Dose:  8 mg Take 8 mg by mouth every eight (8) hours as needed for Nausea. Refills:  0  
     
   
   
   
  
 TOPROL  mg tablet Generic drug:  metoprolol succinate Your last dose was: Your next dose is:    
   
   
 Dose:  100 mg Take 100 mg by mouth daily. Refills:  0 Where to Get Your Medications Information on where to get these meds will be given to you by the nurse or doctor. !  Ask your nurse or doctor about these medications  
  esomeprazole magnesium 20 mg Tbec  
 nicotine 21 mg/24 hr

## 2017-06-21 ENCOUNTER — APPOINTMENT (OUTPATIENT)
Dept: GENERAL RADIOLOGY | Age: 32
End: 2017-06-21
Attending: HOSPITALIST
Payer: MEDICAID

## 2017-06-21 LAB
ALBUMIN SERPL BCP-MCNC: 4.3 G/DL (ref 3.5–5)
ALBUMIN/GLOB SERPL: 1.3 {RATIO} (ref 1.1–2.2)
ALP SERPL-CCNC: 81 U/L (ref 45–117)
ALT SERPL-CCNC: 26 U/L (ref 12–78)
ANION GAP BLD CALC-SCNC: 8 MMOL/L (ref 5–15)
AST SERPL W P-5'-P-CCNC: 18 U/L (ref 15–37)
BASOPHILS # BLD AUTO: 0 K/UL (ref 0–0.1)
BASOPHILS # BLD: 0 % (ref 0–1)
BILIRUB SERPL-MCNC: 0.4 MG/DL (ref 0.2–1)
BUN SERPL-MCNC: 7 MG/DL (ref 6–20)
BUN/CREAT SERPL: 6 (ref 12–20)
CALCIUM SERPL-MCNC: 9.2 MG/DL (ref 8.5–10.1)
CHLORIDE SERPL-SCNC: 109 MMOL/L (ref 97–108)
CO2 SERPL-SCNC: 29 MMOL/L (ref 21–32)
CREAT SERPL-MCNC: 1.17 MG/DL (ref 0.7–1.3)
EOSINOPHIL # BLD: 0.1 K/UL (ref 0–0.4)
EOSINOPHIL NFR BLD: 1 % (ref 0–7)
ERYTHROCYTE [DISTWIDTH] IN BLOOD BY AUTOMATED COUNT: 13.5 % (ref 11.5–14.5)
GLOBULIN SER CALC-MCNC: 3.3 G/DL (ref 2–4)
GLUCOSE SERPL-MCNC: 91 MG/DL (ref 65–100)
HCT VFR BLD AUTO: 47.1 % (ref 36.6–50.3)
HGB BLD-MCNC: 15.9 G/DL (ref 12.1–17)
LYMPHOCYTES # BLD AUTO: 32 % (ref 12–49)
LYMPHOCYTES # BLD: 2.3 K/UL (ref 0.8–3.5)
MAGNESIUM SERPL-MCNC: 2.3 MG/DL (ref 1.6–2.4)
MCH RBC QN AUTO: 31.5 PG (ref 26–34)
MCHC RBC AUTO-ENTMCNC: 33.8 G/DL (ref 30–36.5)
MCV RBC AUTO: 93.5 FL (ref 80–99)
MONOCYTES # BLD: 0.5 K/UL (ref 0–1)
MONOCYTES NFR BLD AUTO: 8 % (ref 5–13)
NEUTS SEG # BLD: 4.3 K/UL (ref 1.8–8)
NEUTS SEG NFR BLD AUTO: 59 % (ref 32–75)
PHOSPHATE SERPL-MCNC: 4.2 MG/DL (ref 2.6–4.7)
PLATELET # BLD AUTO: 224 K/UL (ref 150–400)
POTASSIUM SERPL-SCNC: 3.5 MMOL/L (ref 3.5–5.1)
PROT SERPL-MCNC: 7.6 G/DL (ref 6.4–8.2)
RBC # BLD AUTO: 5.04 M/UL (ref 4.1–5.7)
SODIUM SERPL-SCNC: 146 MMOL/L (ref 136–145)
WBC # BLD AUTO: 7.2 K/UL (ref 4.1–11.1)

## 2017-06-21 PROCEDURE — 74011250636 HC RX REV CODE- 250/636: Performed by: FAMILY MEDICINE

## 2017-06-21 PROCEDURE — 83735 ASSAY OF MAGNESIUM: CPT | Performed by: FAMILY MEDICINE

## 2017-06-21 PROCEDURE — 74011250637 HC RX REV CODE- 250/637: Performed by: FAMILY MEDICINE

## 2017-06-21 PROCEDURE — 84100 ASSAY OF PHOSPHORUS: CPT | Performed by: FAMILY MEDICINE

## 2017-06-21 PROCEDURE — 96376 TX/PRO/DX INJ SAME DRUG ADON: CPT

## 2017-06-21 PROCEDURE — 99218 HC RM OBSERVATION: CPT

## 2017-06-21 PROCEDURE — 85025 COMPLETE CBC W/AUTO DIFF WBC: CPT | Performed by: FAMILY MEDICINE

## 2017-06-21 PROCEDURE — 74011000250 HC RX REV CODE- 250: Performed by: NURSE PRACTITIONER

## 2017-06-21 PROCEDURE — 74011250637 HC RX REV CODE- 250/637: Performed by: HOSPITALIST

## 2017-06-21 PROCEDURE — 74000 XR ABD PORT  1 V: CPT

## 2017-06-21 PROCEDURE — 80053 COMPREHEN METABOLIC PANEL: CPT | Performed by: FAMILY MEDICINE

## 2017-06-21 PROCEDURE — 74011250636 HC RX REV CODE- 250/636: Performed by: HOSPITALIST

## 2017-06-21 PROCEDURE — 36415 COLL VENOUS BLD VENIPUNCTURE: CPT | Performed by: FAMILY MEDICINE

## 2017-06-21 RX ORDER — HYDROMORPHONE HYDROCHLORIDE 1 MG/ML
1 INJECTION, SOLUTION INTRAMUSCULAR; INTRAVENOUS; SUBCUTANEOUS
Status: DISCONTINUED | OUTPATIENT
Start: 2017-06-21 | End: 2017-06-22 | Stop reason: HOSPADM

## 2017-06-21 RX ORDER — IBUPROFEN 200 MG
1 TABLET ORAL DAILY
Status: DISCONTINUED | OUTPATIENT
Start: 2017-06-21 | End: 2017-06-22 | Stop reason: HOSPADM

## 2017-06-21 RX ADMIN — DESMOPRESSIN ACETATE 100 MCG: 0.1 TABLET ORAL at 11:39

## 2017-06-21 RX ADMIN — HYDROCODONE BITARTRATE AND ACETAMINOPHEN 1 TABLET: 5; 325 TABLET ORAL at 22:36

## 2017-06-21 RX ADMIN — ONDANSETRON 4 MG: 2 INJECTION INTRAMUSCULAR; INTRAVENOUS at 09:30

## 2017-06-21 RX ADMIN — AMLODIPINE BESYLATE 10 MG: 5 TABLET ORAL at 11:39

## 2017-06-21 RX ADMIN — HYDROMORPHONE HYDROCHLORIDE 1 MG: 1 INJECTION, SOLUTION INTRAMUSCULAR; INTRAVENOUS; SUBCUTANEOUS at 14:41

## 2017-06-21 RX ADMIN — DOXAZOSIN 2 MG: 2 TABLET ORAL at 21:43

## 2017-06-21 RX ADMIN — METOPROLOL SUCCINATE 100 MG: 50 TABLET, EXTENDED RELEASE ORAL at 15:07

## 2017-06-21 RX ADMIN — HYDROCODONE BITARTRATE AND ACETAMINOPHEN 1 TABLET: 5; 325 TABLET ORAL at 05:19

## 2017-06-21 RX ADMIN — METOPROLOL TARTRATE 5 MG: 5 INJECTION INTRAVENOUS at 09:29

## 2017-06-21 RX ADMIN — Medication 10 ML: at 14:00

## 2017-06-21 RX ADMIN — Medication 10 ML: at 21:43

## 2017-06-21 RX ADMIN — HYDROMORPHONE HYDROCHLORIDE 0.5 MG: 1 INJECTION, SOLUTION INTRAMUSCULAR; INTRAVENOUS; SUBCUTANEOUS at 00:37

## 2017-06-21 RX ADMIN — HYDROMORPHONE HYDROCHLORIDE 1 MG: 1 INJECTION, SOLUTION INTRAMUSCULAR; INTRAVENOUS; SUBCUTANEOUS at 11:17

## 2017-06-21 RX ADMIN — HYDROCODONE BITARTRATE AND ACETAMINOPHEN 1 TABLET: 5; 325 TABLET ORAL at 13:01

## 2017-06-21 RX ADMIN — Medication 10 ML: at 05:20

## 2017-06-21 RX ADMIN — ALPRAZOLAM 0.25 MG: 0.25 TABLET ORAL at 13:04

## 2017-06-21 RX ADMIN — HYDROMORPHONE HYDROCHLORIDE 0.5 MG: 1 INJECTION, SOLUTION INTRAMUSCULAR; INTRAVENOUS; SUBCUTANEOUS at 09:15

## 2017-06-21 NOTE — CONSULTS
[address] [facility] [address]   [address] [facility]       Name:  Latha Rich   MR#:  [id number]   :  1985   Account #:  [de-identified]    Date of Consultation:  2017   Date of Adm:  2017       DATE OF CONSULTATION: 2017. REASON FOR CONSULTATION: Hydronephrosis and bladder   distention. HISTORY: The patient is a 33-year-old gentleman admitted last night   at [facility] by the hospitalist service. He came to the emergency   room with complaint of upper epigastric pain. He has had this on and   off going back for quite awhile. The patient is a somewhat limited   historian. I did review previous ER visits in the [facility] system and he has   had multiple ER visits and evaluation for similar symptoms and   apparently has a history of a suspected pancreatitis at times. On CT   scan he had a fairly distended looking bladder. There is chronic   appearing right-sided hydronephrosis, as well as mild left-sided   hydronephrosis. He has had a longstanding issue with hydronephrosis   on the right. In addition, he tells me that he is known to have a poorly   functioning bladder. He has been evaluated at [facility]. It was   recommended that he have a Gonzalez catheter or do intermittent   catheterization previously, although he has refused both of those   options. It sounds like his followup with them has been somewhat   sporadic; however, he tells me he was recently there where a Gonzalez   catheter was placed, but then subsequently removed at [place]. He   feels he voids well with a good urinary stream. He reports no flank or   kidney type pain. He was having pain in the epigastric region, which   has resolved since his admission. He has been afebrile. Urinalysis at   time of admission was normal. Kidney function was also normal with a   creatinine of 1.17 this morning. The problem, therefore, is   hydronephrosis location both kidneys, but right more so than the left.    Duration unknown, but right-sided hydronephrosis has been present at   least for the past several years. MODIFYING FACTORS: None. Associated symptoms and signs: Fairly   full looking bladder with a suggestion of voiding dysfunction which   could be contributing to the hydronephrosis. PAST MEDICAL HISTORY: Diabetes and hypertension. SURGERIES: None. SOCIAL HISTORY: He does smoke. He denies drinking or drug use. REVIEW OF SYSTEMS   A 12-point review of systems is negative other than that described   above. PHYSICAL EXAMINATION   VITAL SIGNS: Blood pressure 153/97, heart rate 105. He is afebrile   with a temperature of 98.1. GENERAL: He is awake, alert, oriented, in no acute distress. HEENT: Normocephalic, atraumatic. Pupils equal, round and reactive. Hearing seems normal.   LUNGS: Respiratory effort and breathing are normal. Chest wall is   normal.   ABDOMEN: Soft and benign without any tenderness, organomegaly or   hernia appreciated. BACK: Without CVA tenderness. EXTREMITIES: Without clubbing, cyanosis or edema. LABORATORY DATA: Show a normal urinalysis dated 06/20/2017,   with no blood or suggestion of infection. His creatinine is normal at   1.17. I reviewed the CT scan. There is right-sided hydronephrosis,   which looks chronically with some thinning of the parenchyma. On the   left side is mild hydronephrosis. The bladder looks distended at the   time of CT.    ASSESSMENT AND PLAN: He appears to have underlying bladder   dysfunction. I am not exactly sure what workup he has had before, but   Gonzalez catheter and CIC were recommended to protect his kidney   function. He has refused both options. He feels he is voiding well, but   does appear to be retaining.  I do not see that he needs any urgent   intervention; however, he should probably followup with us to continue   to monitor the kidneys and perhaps go ahead with urodynamics and   further discuss the possibility of a Gonzalez or CIC at some point. He can   followup with us at [facility] or with [facility] as they have followed   him in the past. I have added our contact information to his discharge   instructions. Please call if there are any questions.         Diann Gallegos MD DPM / Ahmad Sandifer   D:  06/21/2017   07:37   T:  06/21/2017   08:39   Job #:  [id number]

## 2017-06-21 NOTE — INTERDISCIPLINARY ROUNDS
IDR/SLIDR Summary          Patient: Malgorzata Walton MRN: [id number]    Age: 36 y.o. YOB: 1985 Room/Bed: [facility]   Admit Diagnosis: Abdominal pain  Principal Diagnosis: Abdominal pain   Goals: EGD  Readmission: NO  Quality Measure: SEPSIS  VTE Prophylaxis: Mechanical  Influenza Vaccine screening completed? YES  Pneumococcal Vaccine screening completed? YES  Mobility needs: No   Nutrition plan:Yes  Consults:Case Management    Financial concerns:Yes  Escalated to CM? YES  RRAT Score: 11   Interventions:  Testing due for pt today?  NO  LOS: 0 days Expected length of stay 3 days  Discharge plan: pending   PCP: Clementine Zuñiga MD  Transportation needs: No    Days before discharge:one day until discharge   Discharge disposition: Home    Signed:     Xander Wallace RN  6/22/17  9:00 AM

## 2017-06-21 NOTE — CONSULTS
Seen and examined  Thanks for the consult  A/P:DI on chronic desmopressin PO        Chronic bladder dysfunction with b/l hydro ,rt>lt ;work up done in another hospital;         Hypernatremia better         Recurrent abd pain  On desmopressin  Monitor sodium,UO  Refusing espana  [place] fluid intake  S.p contrast IV yesterday

## 2017-06-21 NOTE — PROGRESS NOTES
Hospitalist Progress Note      Hospital summary: 23 y.o man with HTN, diabetes insipidus, who presented with epigastric abdominal pain. Work-up revealed bladder distention with right hydroureteronephrosis. Assessment/Plan:  Abdominal pain: (POA) suspect this is due to bladder distention  -recommended espana catheter placement but he is adamantly refusing, citing that is urinating fine  -states he has worsening/new pain today in the epigastrium, will check an acute series now  -increase IV hydromorphone to 1 mg q3h PRN  -will ask GI to see him    R hydronephrosis: (POA)  -refusing espana as above  -continue doxazosin  -urology consulted, f/u as an outpatient    DI w/ hypernatremia: (POA) chronic  -continue home desmopressin  -nephrology consulted    HTN: elevated here likely confounded by pain.  -continue current meds for now and monitor    Code status: full  DVT prophylaxis: SCDs  Disposition: home when appropriate  ----------------------------------------------    CC: abdominal pain    S: reports severe abdominal pain different than on admission, now in the epigastrium non-radiating associated with nausea and no vomiting, no back pain, urinating, no dysuria, no hematuria, no dyspnea or diarrhea. Review of Systems:  Pertinent items are noted in HPI.     O:  Visit Vitals    BP (!) 153/97 (BP 1 Location: Left arm, BP Patient Position: At rest)    Pulse (!) 105    Temp 98.1 °F (36.7 °C)    Resp 19    Ht 5' 9\" (1.753 m)    Wt 90.1 kg (198 lb 10.2 oz)    SpO2 97%    BMI 29.33 kg/m2       PHYSICAL EXAM:  Gen: appears uncomfortable  HEENT: anicteric sclerae, normal conjunctiva, oropharynx clear, MM moist  Neck: supple  Heart: RR, tachycardic, no MRG, no JVD, no peripheral edema  Lungs: CTA b/l, non-labored respirations  Abd: soft, moderately tender in the epigastrium and suprapubic area, bladder is distended and palpable, BS+,   Extr: warm  Skin: dry, no rash  Neuro: CN II-XII grossly intact, normal speech, moves all extremities  Psych: anxious/uncomfortable      Intake/Output Summary (Last 24 hours) at 06/21/17 1110  Last data filed at 06/21/17 0559   Gross per 24 hour   Intake             3000 ml   Output             5900 ml   Net            -2900 ml        Recent labs & imaging reviewed:  Recent Labs      06/21/17   0322  06/20/17   1230   WBC  7.2  6.6   HGB  15.9  17.0   HCT  47.1  49.4   PLT  224  281     Recent Labs      06/21/17   0322  06/20/17   1230   NA  146*  148*   K  3.5  3.3*   CL  109*  113*   CO2  29  27   BUN  7  10   CREA  1.17  1.15   GLU  91  113*   CA  9.2  9.6   MG  2.3   --    PHOS  4.2   --      Recent Labs      06/21/17   0322  06/20/17   1230   SGOT  18  26   ALT  26  29   AP  81  89   TBILI  0.4  0.5   TP  7.6  8.9*   ALB  4.3  4.6   GLOB  3.3  4.3*   LPSE   --   239       Med list reviewed  Current Facility-Administered Medications   Medication Dose Route Frequency    HYDROmorphone (PF) (DILAUDID) injection 1 mg  1 mg IntraVENous Q3H PRN    ALPRAZolam (XANAX) tablet 0.25 mg  0.25 mg Oral TID PRN    amLODIPine (NORVASC) tablet 10 mg  10 mg Oral DAILY    desmopressin (DDAVP) tablet 100 mcg  0.1 mg Oral DAILY    doxazosin (CARDURA) tablet 2 mg  2 mg Oral QHS    metoprolol succinate (TOPROL-XL) XL tablet 100 mg  100 mg Oral DAILY    ondansetron (ZOFRAN ODT) tablet 8 mg  8 mg Oral Q8H PRN    hydrALAZINE (APRESOLINE) 20 mg/mL injection 10 mg  10 mg IntraVENous Q4H PRN    sodium chloride (NS) flush 5-10 mL  5-10 mL IntraVENous Q8H    sodium chloride (NS) flush 5-10 mL  5-10 mL IntraVENous PRN    acetaminophen (TYLENOL) tablet 650 mg  650 mg Oral Q4H PRN    HYDROcodone-acetaminophen (NORCO) 5-325 mg per tablet 1 Tab  1 Tab Oral Q4H PRN    diphenhydrAMINE (BENADRYL) injection 12.5 mg  12.5 mg IntraVENous Q4H PRN    ondansetron (ZOFRAN) injection 4 mg  4 mg IntraVENous Q4H PRN    magnesium hydroxide (MILK OF MAGNESIA) 400 mg/5 mL oral suspension 30 mL 30 mL Oral DAILY PRN    albuterol (PROVENTIL VENTOLIN) nebulizer solution 2.5 mg  2.5 mg Nebulization Q6H PRN    metoprolol (LOPRESSOR) injection 5 mg  5 mg IntraVENous Q6H PRN       Care Plan discussed with:  Patient/Family, Nurse and     Pauline Bingham MD  Internal Medicine  Date of Service: 6/21/2017

## 2017-06-21 NOTE — CONSULTS
[address] [facility]  [address] [facility] [address]  [place], [address]  [phone]                     GI CONSULTATION NOTE  Brittni Byrnes, AGACNP-BC  Work Cell: [phone]      NAME:  Duncan Benavides   :   1985   MRN:   [id number]       Referring Provider: Dr. Sagar Altman Date: 2017     Chief Complaint: Abdominal pain    History of Present Illness:  Patient is a 36 y.o. who is seen in consultation at the request of Dr. David Valdivia for abdominal pain. Mr. Myra Lebron has a PMH as detailed below. He presented to the hospital with abdominal pain. He is a very limited historian and upon asking what brought him to the hospital stated \"I have told you all already. \" Onset of abdominal pain was Friday which progressively worsened. It is located in epigastrium and upper abdomen. It is constant, can radiate to his back and described as sharp. He denies any associated fever, chills, nausea, vomiting, change in bowel habits, black or bloody stools. He reports having similar pain one year ago but cannot recall any further details. He denies any tobacco, illicit drug or alcohol use. He does report prior history of heavy alcohol use 4-5 years ago. He denies any history of ulcers, gallstones or pancreatitis. No prior EGD or colonoscopy. He denies any aspirin or NSAID use. Labs here are essentially unremarkable. LFTs and lipase are normal. CTA demonstrated marked distention of the bladder and bilateral hydroureteropnephrosis. He has been seen by urology and espana placement was recommended which patient refused. Upon review of [facility] records, he has had numerous (5+) ER visits since Friday for the above symptoms. Previous work-up including labs and CT scan were unremarkable. Toxicology screen completed at [facility] was positive for THC. There is some questionable history of alcoholic pancreatitis, although patient denies this.        PMH:  Past Medical History:   Diagnosis Date    Diabetes ([address]     Hypertension        PSH:  History reviewed. No pertinent surgical history. Allergies:  No Known Allergies    Home Medications:  Prior to Admission Medications   Prescriptions Last Dose Informant Patient Reported? Taking? ALPRAZolam (XANAX) 0.25 mg tablet Unknown at Unknown time  Yes No   Sig: Take 0.25 mg by mouth three (3) times daily as needed for Anxiety. HYDROcodone-acetaminophen (NORCO) 5-325 mg per tablet Unknown at Unknown time  Yes No   Sig: Take 1 Tab by mouth every six (6) hours as needed for Pain. albuterol (PROVENTIL HFA, VENTOLIN HFA, PROAIR HFA) 90 mcg/actuation inhaler Unknown at Unknown time  Yes No   Sig: Take 2 Puffs by inhalation every six (6) hours as needed for Wheezing or Shortness of Breath. amLODIPine (NORVASC) 10 mg tablet Unknown at Unknown time  Yes No   Sig: Take 10 mg by mouth daily. desmopressin (DDAVP) 0.1 mg tablet Unknown at Unknown time  Yes No   Sig: Take 0.1 mg by mouth daily. doxazosin (CARDURA) 2 mg tablet Unknown at Unknown time  Yes No   Sig: Take 2 mg by mouth nightly. metoprolol succinate (TOPROL XL) 100 mg tablet Unknown at Unknown time  Yes No   Sig: Take 100 mg by mouth daily. ondansetron (ZOFRAN ODT) 8 mg disintegrating tablet Unknown at Unknown time  Yes No   Sig: Take 8 mg by mouth every eight (8) hours as needed for Nausea.       Facility-Administered Medications: None       Hospital Medications:  Current Facility-Administered Medications   Medication Dose Route Frequency    HYDROmorphone (PF) (DILAUDID) injection 1 mg  1 mg IntraVENous Q3H PRN    ALPRAZolam (XANAX) tablet 0.25 mg  0.25 mg Oral TID PRN    amLODIPine (NORVASC) tablet 10 mg  10 mg Oral DAILY    desmopressin (DDAVP) tablet 100 mcg  0.1 mg Oral DAILY    doxazosin (CARDURA) tablet 2 mg  2 mg Oral QHS    metoprolol succinate (TOPROL-XL) XL tablet 100 mg  100 mg Oral DAILY    ondansetron (ZOFRAN ODT) tablet 8 mg  8 mg Oral Q8H PRN    hydrALAZINE (APRESOLINE) 20 mg/mL injection 10 mg 10 mg IntraVENous Q4H PRN    sodium chloride (NS) flush 5-10 mL  5-10 mL IntraVENous Q8H    sodium chloride (NS) flush 5-10 mL  5-10 mL IntraVENous PRN    acetaminophen (TYLENOL) tablet 650 mg  650 mg Oral Q4H PRN    HYDROcodone-acetaminophen (NORCO) 5-325 mg per tablet 1 Tab  1 Tab Oral Q4H PRN    diphenhydrAMINE (BENADRYL) injection 12.5 mg  12.5 mg IntraVENous Q4H PRN    ondansetron (ZOFRAN) injection 4 mg  4 mg IntraVENous Q4H PRN    magnesium hydroxide (MILK OF MAGNESIA) 400 mg/5 mL oral suspension 30 mL  30 mL Oral DAILY PRN    albuterol (PROVENTIL VENTOLIN) nebulizer solution 2.5 mg  2.5 mg Nebulization Q6H PRN    metoprolol (LOPRESSOR) injection 5 mg  5 mg IntraVENous Q6H PRN       Social History:  Social History   Substance Use Topics    Smoking status: Current Some Day Smoker    Smokeless tobacco: Not on file    Alcohol use No       Family History:  History reviewed. No pertinent family history. Review of Systems:    Constitutional: negative fever, negative chills, negative weight loss  Eyes:   negative visual changes  ENT:   negative sore throat, tongue or lip swelling  Respiratory:  negative cough, negative dyspnea  Cards:  negative for chest pain, palpitations, lower extremity edema  GI:   See HPI  :  negative for frequency, dysuria  Integument:  negative for rash and pruritus  Heme:  negative for easy bruising and gum/nose bleeding  Musculoskel: negative for myalgias, back pain and muscle weakness  Neuro: negative for headaches, dizziness, vertigo  Psych:  negative for feelings of anxiety, depression      Objective:   Patient Vitals for the past 8 hrs:   BP Temp Pulse Resp SpO2   06/21/17 1100 (!) 184/102 - 88 16 97 %   06/21/17 0700 (!) 153/97 98.1 °F (36.7 °C) (!) 105 19 97 %     06/21 0701 - 06/21 1900  In: -   Out: 1400 [Urine:1400]  06/19 1901 - 06/21 0700  In: 3000 [P.O.:3000]  Out: 5900 [Urine:5900]      PHYSICAL EXAM:  General: WD, WN.  Alert, cooperative, no acute distress.    HEENT: NC, Atraumatic. PERRLA, EOMI. Anicteric sclerae. Lungs:  CTA Bilaterally. No Wheezing/Rhonchi/Rales. Heart:  Regular rate and rhythm, No murmur, No Rubs, No Gallops  Abdomen: Soft, non-distended, mild RUQ/epigastric/LUQ tenderness.  +Bowel sounds, no HSM  Extremities: No c/c/e  Neurologic:  Alert and oriented X 3. No acute neurological distress. Psych:   Limited historian. Irritated. Data Review     Recent Labs      06/21/17   0322  06/20/17   1230   WBC  7.2  6.6   HGB  15.9  17.0   HCT  47.1  49.4   PLT  224  281     Recent Labs      06/21/17   0322  06/20/17   1230   NA  146*  148*   K  3.5  3.3*   CL  109*  113*   CO2  29  27   BUN  7  10   CREA  1.17  1.15   GLU  91  113*   PHOS  4.2   --    CA  9.2  9.6     Recent Labs      06/21/17   0322  06/20/17   1230   SGOT  18  26   AP  81  89   TP  7.6  8.9*   ALB  4.3  4.6   GLOB  3.3  4.3*   LPSE   --   239     No results for input(s): INR, PTP, APTT in the last 72 hours. No lab exists for component: INREXT     Imaging studies reviewed    Assessment:   Epigastric pain- with nausea and vomiting per previous reports. Differentials include PUD, erosions, gastritis or esophagitis. Patient Active Problem List   Diagnosis Code    Hypertension I10    Diabetes insip[facility]us [facility] E23.2    Abdominal pain R10.9            Plan:   -Supportive management per primary team  -Start PPI  -NPO after midnight  -Plan for EGD tomorrow  -Further recommendations to follow   -Discussed with Dr. Checo Gutierrez  -Will follow along with you  -Thank you kindly for allowing us to participate in the care of this patient    I have examined the patient. I have reviewed the chart and agree with the documentation recorded by the NP, including the assessment, treatment plan, and disposition. ASSESSMENT AND PLAN:  36 yr old male has presented with epigastric pain and hydronephrosis.  Differential diagnosis includes peptic ulcer, gastritis, esophagitis, etc.  NPO after 5 AM  EGD in AM  IV PPI  Further recommendations after EGD.       Nikolay Munson MD

## 2017-06-21 NOTE — PROGRESS NOTES
Contacted by unit secretary to call Urologist for Stat consult    Spoke with Dr. Trish Rankin - consult may be done in am. Reviewed labs and CT Imaging with him. Also spoke with pts nurse about BP and HR. Placed extra antihypertensive orders in for IV lopressor (already has hydralazine). Pt had taken his am medications (100 mg toprol XL) and norvsc (per pt). Nursing report pt had good response to 0.5 mg dilaudid earlier in evening.

## 2017-06-21 NOTE — CONSULTS
[facility] [address]   [address] [facility]       Name:  Ruth Ann Logan   MR#:  [id number]   :  1985   Account #:  [de-identified]    Date of Consultation:  2017   Date of Adm:  2017       HISTORY FOR CONSULTATION: The patient was seen for a history   of DI and some hydronephrosis. He is a very poor historian. The   patient during the history said he is in pain all the time. He was seen by   urology previously. This is a 72-year-old roberto that was admitted, came   to the ER with epigastric pain. He e had this on and off for a while and   previous visit, he had a history of pancreatitis. He has recommended   for indwelling of a chronic Gonzalez because of some bladder dysfunction,   but refused and is refusing that for urology. His creatinine has been 1.1. He came this time, like we said, for pain. As per chart, and like we   said, the patient is a poor historian. He is known to have diabetes   insipidus not sure for how long, on and off nausea and vomiting and as   far as outpatient medication, he was supposed to be on DDAVP 0.1   mg tablet daily, not sure if he is taking them. Very polyuric. His sodium   is 146. It was 148 on admission and his CAT scan was done, which   showed bilateral hydronephrosis. He has more on the right than on the   left. CT scan was done. It was a CTA that showed severe right   hydronephrosis, thinning of the right kidney and less left   hydronephrosis than the right. Unfortunately, like we said, we have   limited history from him. PAST MEDICAL HISTORY: DI, bladder dysfunction and chronic   abdominal pain. SOCIAL HISTORY: Reviewed. FAMILY HISTORY: Reviewed. MEDICATIONS AS OUTPATIENT: Included: DDAVP p.o. 0.1 daily, not   sure how compliant with his and Cardura among others and Norvasc. MEDICATIONS AS INPATIENT: DDAVP/desmopressin 0.1   daily, Cardura 2 mg, Norvasc 10 mg, not on IV fluid.     PHYSICAL EXAMINATION   GENERAL: Mild distress. He said he is in pain. VITAL SIGNS: Blood pressure 160/97, saturating 97%. I's and O's 3 in,   6 liters output. ABDOMEN: Tender, did not let me touch it. EXTREMITIES: Trace edema bilaterally. LABORATORY DATA: UA: 1005 specific gravity. Hemoglobin 15.9,   platelets 010. Sodium 146, potassium 3.5, BUN 7, creatinine 1.1,   calcium 9.2, phosphorus 4.2, bicarbonate 29, magnesium is 2.3. Albumin is 4.3. IMPRESSION:   1. History of diabetes insipidus on DDAVP. Sodium is a little bit better. Not sure about his compliance with DDAVP tablet. I am not sure how   long he had the diabetes insipidus. 2. Bilateral hydronephrosis with right more than left, with kind of more   cortical atrophy on the right, refusing Gonzalez catheter seen by urology. 3. History of hypertension. 4. Recurrent epigastric pain. RECOMMENDATIONS:   1. Agree with resuming his DDAVP/desmopressin tablet. 2. Will adjust that depending on his sodium level and also his urine   output. 3. [place] fluid intake. 4. Urology seeing him and then he refuses Goznalez catheter, which put   him at risk for having more kidney damage. 5. Status post contrast study yesterday. 6. We will follow. Discussed in length with him. MD Yovanny Landis / Regan Valverde   D:  06/21/2017   09:57   T:  06/21/2017   10:29   Job #:  [id number]     ??

## 2017-06-21 NOTE — PROGRESS NOTES
Bedside and Verbal shift change report given to Nanette Keita RN (oncoming nurse) by Misha Carmona RN (offgoing nurse). Report included the following information SBAR, Kardex, Intake/Output, MAR and Recent Results.

## 2017-06-21 NOTE — DISCHARGE INSTRUCTIONS
You need to follow up with Urology regarding kidneys and bladder: [facility] ([phone]) or [facility]. ADDITIONAL CARE RECOMMENDATIONS:   1. Take medication as prescribed. 2. Keep appointment(s) as recommended/scheduled. 3. Your PCP may want to refer you to Psychiatry due to your anxiety. DIET: Cardiac and GERD- appropriate diet    ACTIVITY: Activity as tolerated    WOUND CARE: none    EQUIPMENT needed: none      Esomeprazole (By mouth)   Esomeprazole ([id number])  Treats gastroesophageal reflux disease (GERD) and a damaged esophagus. Also treats and helps prevent stomach ulcers. This medicine is a proton pump inhibitor (PPI). Brand Name(s): Esomeprazole Strontium, NexIUM, NexIUM 24HR   There may be other brand names for this medicine. When This Medicine Should Not Be Used: This medicine is not right for everyone. Do not use it if you had an allergic reaction to esomeprazole or similar medicines. How to Use This Medicine:   Delayed Release Capsule, Packet  · Your doctor will tell you how much medicine to use. Do not use more than directed. Take all of the medicine in your prescription, even if you feel better after a few days. · Follow the instructions on the medicine label if you are using this medicine without a prescription. · Take this medicine at least 1 hour before a meal.  · Delayed-release capsule:   ¨ Swallow the capsule whole. Do not crush or chew it. ¨ If you cannot swallow the capsule, you may use open it and pour the medicine into 1 tablespoon of applesauce. Stir the mixture well and swallow it right away. Do not chew. Throw away any remaining mixture. · Packets:   ¨ 2.5- or 5-milligram (mg) packet: Mix with 1 teaspoon (5 milliliters (mL)) of water and stir well. ¨ 10-, 20-, or 40-mg packet: Mix with 1 tablespoon (15 mL) of water and stir well. ¨ Let the mixture thicken for 2 to 3 minutes. Stir once more, and drink within 30 minutes.  Add more water to any leftover medicine and drink it right away. · Feeding tube:   ¨ Capsule contents:   § Open the capsule and pour the contents into a 60-mL catheter-tipped syringe. Pour 50 mL of water into the syringe. Do not use any other liquids or syringes. § Place the syringe plunger back into the syringe and shake well for 15 seconds. Make sure no medicine is stuck in the tip of the syringe. Inject the medicine into the NG tube right away. § Use water to rinse any leftover medicine through the tube. ¨ Packet contents:   § 2.5- or 5-mg packets: Add 5 mL (1 teaspoon) of water into a catheter-tipped syringe. Pour the 2.5- or 5-mg packet into the syringe, shake well right away, and let the medicine thicken for 2 to 3 minutes. § 10-, 20-, or 40-mg packets: Add 15 mL (1 tablespoon) of water into a catheter-tipped syringe. Pour the 10-, 20-, or 40-mg packet into the syringe, shake well right away, and let the medicine thicken for 2 to 3 minutes. § Shake the thickened mixture once more, and inject the medicine into the tube within 30 minutes. § Refill the syringe with the same amount of water that was used in the mixture (5 or 15 mL). Shake it gently, and inject it into the tube to rinse any leftover medicine through the tube. ¨ This medicine should come with a Medication Guide. Ask your pharmacist for a copy if you do not have one. ¨ Missed dose: Take a dose as soon as you remember. If it is almost time for your next dose, wait until then and take a regular dose. Do not take extra medicine to make up for a missed dose. ¨ Store the medicine in a closed container at room temperature, away from heat, moisture, and direct light. Drugs and Foods to Avoid:   Ask your doctor or pharmacist before using any other medicine, including over-the-counter medicines, vitamins, and herbal products. · Some foods and medicines can affect how esomeprazole works.  Tell your doctor if you are using any of the following:  ¨ Atazanavir, cilostazol, diazepam, digoxin, erlotinib, ketoconazole, methotrexate, mycophenolate mofetil, nelfinavir, rifampin, saquinavir, Mike's wort, tacrolimus, voriconazole  ¨ Blood thinner (including clopidogrel or warfarin)  ¨ Diuretic (water pill)  ¨ Iron supplements  Warnings While Using This Medicine:   · Tell your doctor if you are pregnant or breastfeeding, or if you have kidney disease, liver disease, lupus, or osteoporosis. · This medicine may cause the following problems:  ¨ Kidney problems  ¨ Broken bones in the hip, wrist, or spine  ¨ Low levels of vitamin B12 or magnesium in the blood  · This medicine can cause diarrhea. Call your doctor if the diarrhea becomes severe, does not stop, or is bloody. Do not take any medicine to stop diarrhea until you have talked to your doctor. Diarrhea can occur 2 months or more after you stop taking this medicine. · Tell any doctor or dentist who treats you that you are using this medicine. This medicine may affect certain medical test results. · Your doctor will check your progress and the effects of this medicine at regular visits. Keep all appointments. · Keep all medicine out of the reach of children. Never share your medicine with anyone.   Possible Side Effects While Using This Medicine:   Call your doctor right away if you notice any of these side effects:  · Allergic reaction: Itching or hives, swelling in your face or hands, swelling or tingling in your mouth or throat, chest tightness, trouble breathing  · Blistering, peeling, red skin rash  · Fast, pounding, or uneven heartbeat  · Fever, joint pain, swelling in your body, unusual weight gain, changes in how much or how often you urinate  · Joint pain, rash on your cheeks or arms that gets worse in the sun  · Seizures, dizziness, muscle cramps, or twitching  · Severe diarrhea that does not go away, fever  · Stomach pain, nausea, vomiting, weight loss, bloody or black stools  If you notice these less serious side effects, talk with your doctor:   · Headache  · Mild diarrhea, constipation, or gas  If you notice other side effects that you think are caused by this medicine, tell your doctor. Call your doctor for medical advice about side effects. You may report side effects to FDA at 4-615-FDA-8049  © 2017 [address] Information is for End User's use only and may not be sold, redistributed or otherwise used for commercial purposes. The above information is an  only. It is not intended as medical advice for individual conditions or treatments.  Talk to your doctor, nurse or pharmacist before following any medical regimen to see if it is safe and effective for you.

## 2017-06-21 NOTE — PROGRESS NOTES
Primary Nurse Grover Drummond RN and Amalia Valdez RN performed a dual skin assessment on this patient No impairment noted  Devon score is 23

## 2017-06-21 NOTE — CONSULTS
Urology Consult    Patient: Saida Fox MRN: [id number]  SSN: [id number]    YOB: 1985  Age: 36 y.o. Sex: male          Date of Consultation:  June 21, 2017  Requesting Physician: Griselda Tate MD  Reason for Consultation:    Pre-existing [facility] Patient:   Physician: Dr. Nay Feliciano is dictated. Assessment/Plan:   No intervention needed. Known to have bladder dysfunction and has refused chronic espana or CIC. He should follow-up with us or [facility]. I added my contact information to D/C instructions.      Signed By: James Ureña MD  - June 21, 2017

## 2017-06-22 ENCOUNTER — ANESTHESIA (OUTPATIENT)
Dept: ENDOSCOPY | Age: 32
End: 2017-06-22
Payer: MEDICAID

## 2017-06-22 ENCOUNTER — ANESTHESIA EVENT (OUTPATIENT)
Dept: ENDOSCOPY | Age: 32
End: 2017-06-22
Payer: MEDICAID

## 2017-06-22 VITALS
WEIGHT: 197 LBS | SYSTOLIC BLOOD PRESSURE: 141 MMHG | TEMPERATURE: 97.9 F | HEART RATE: 88 BPM | RESPIRATION RATE: 16 BRPM | BODY MASS INDEX: 29.18 KG/M2 | OXYGEN SATURATION: 100 % | HEIGHT: 69 IN | DIASTOLIC BLOOD PRESSURE: 77 MMHG

## 2017-06-22 PROBLEM — N13.30 HYDRONEPHROSIS, RIGHT: Status: ACTIVE | Noted: 2017-06-22

## 2017-06-22 PROBLEM — K29.70 GASTRITIS: Status: ACTIVE | Noted: 2017-06-22

## 2017-06-22 PROBLEM — E87.0 HYPERNATREMIA: Status: ACTIVE | Noted: 2017-06-22

## 2017-06-22 PROBLEM — N31.9 BLADDER DYSFUNCTION: Chronic | Status: ACTIVE | Noted: 2017-06-22

## 2017-06-22 LAB
ANION GAP BLD CALC-SCNC: 9 MMOL/L (ref 5–15)
BUN SERPL-MCNC: 7 MG/DL (ref 6–20)
BUN/CREAT SERPL: 7 (ref 12–20)
CALCIUM SERPL-MCNC: 8.9 MG/DL (ref 8.5–10.1)
CHLORIDE SERPL-SCNC: 100 MMOL/L (ref 97–108)
CO2 SERPL-SCNC: 31 MMOL/L (ref 21–32)
CREAT SERPL-MCNC: 1.01 MG/DL (ref 0.7–1.3)
GLUCOSE SERPL-MCNC: 100 MG/DL (ref 65–100)
MAGNESIUM SERPL-MCNC: 1.9 MG/DL (ref 1.6–2.4)
PHOSPHATE SERPL-MCNC: 2.9 MG/DL (ref 2.6–4.7)
POTASSIUM SERPL-SCNC: 2.9 MMOL/L (ref 3.5–5.1)
SODIUM SERPL-SCNC: 140 MMOL/L (ref 136–145)

## 2017-06-22 PROCEDURE — 74011250636 HC RX REV CODE- 250/636: Performed by: HOSPITALIST

## 2017-06-22 PROCEDURE — 74011250636 HC RX REV CODE- 250/636

## 2017-06-22 PROCEDURE — 74011250637 HC RX REV CODE- 250/637: Performed by: INTERNAL MEDICINE

## 2017-06-22 PROCEDURE — 77030032490 HC SLV COMPR SCD KNE COVD -B

## 2017-06-22 PROCEDURE — 74011250636 HC RX REV CODE- 250/636: Performed by: NURSE PRACTITIONER

## 2017-06-22 PROCEDURE — 74011000250 HC RX REV CODE- 250: Performed by: NURSE PRACTITIONER

## 2017-06-22 PROCEDURE — 77030009426 HC FCPS BIOP ENDOSC BSC -B: Performed by: SPECIALIST

## 2017-06-22 PROCEDURE — 96376 TX/PRO/DX INJ SAME DRUG ADON: CPT

## 2017-06-22 PROCEDURE — C9113 INJ PANTOPRAZOLE SODIUM, VIA: HCPCS | Performed by: NURSE PRACTITIONER

## 2017-06-22 PROCEDURE — 74011250637 HC RX REV CODE- 250/637: Performed by: FAMILY MEDICINE

## 2017-06-22 PROCEDURE — 36415 COLL VENOUS BLD VENIPUNCTURE: CPT | Performed by: HOSPITALIST

## 2017-06-22 PROCEDURE — 96375 TX/PRO/DX INJ NEW DRUG ADDON: CPT

## 2017-06-22 PROCEDURE — 80048 BASIC METABOLIC PNL TOTAL CA: CPT | Performed by: HOSPITALIST

## 2017-06-22 PROCEDURE — 99218 HC RM OBSERVATION: CPT

## 2017-06-22 PROCEDURE — 88305 TISSUE EXAM BY PATHOLOGIST: CPT | Performed by: SPECIALIST

## 2017-06-22 PROCEDURE — 74011000250 HC RX REV CODE- 250

## 2017-06-22 PROCEDURE — 84100 ASSAY OF PHOSPHORUS: CPT | Performed by: HOSPITALIST

## 2017-06-22 PROCEDURE — 76060000031 HC ANESTHESIA FIRST 0.5 HR: Performed by: SPECIALIST

## 2017-06-22 PROCEDURE — 76040000019: Performed by: SPECIALIST

## 2017-06-22 PROCEDURE — 83735 ASSAY OF MAGNESIUM: CPT | Performed by: HOSPITALIST

## 2017-06-22 RX ORDER — DOXAZOSIN 2 MG/1
4 TABLET ORAL
Status: DISCONTINUED | OUTPATIENT
Start: 2017-06-22 | End: 2017-06-22 | Stop reason: HOSPADM

## 2017-06-22 RX ORDER — PROPOFOL 10 MG/ML
INJECTION, EMULSION INTRAVENOUS AS NEEDED
Status: DISCONTINUED | OUTPATIENT
Start: 2017-06-22 | End: 2017-06-22 | Stop reason: HOSPADM

## 2017-06-22 RX ORDER — IBUPROFEN 200 MG
1 TABLET ORAL DAILY
Qty: 30 PATCH | Refills: 0 | Status: SHIPPED | OUTPATIENT
Start: 2017-06-22 | End: 2017-07-22

## 2017-06-22 RX ORDER — LIDOCAINE HYDROCHLORIDE 20 MG/ML
INJECTION, SOLUTION EPIDURAL; INFILTRATION; INTRACAUDAL; PERINEURAL AS NEEDED
Status: DISCONTINUED | OUTPATIENT
Start: 2017-06-22 | End: 2017-06-22 | Stop reason: HOSPADM

## 2017-06-22 RX ORDER — FENTANYL CITRATE 50 UG/ML
50 INJECTION, SOLUTION INTRAMUSCULAR; INTRAVENOUS ONCE
Status: DISCONTINUED | OUTPATIENT
Start: 2017-06-22 | End: 2017-06-22 | Stop reason: HOSPADM

## 2017-06-22 RX ORDER — POTASSIUM CHLORIDE 750 MG/1
40 TABLET, FILM COATED, EXTENDED RELEASE ORAL
Status: COMPLETED | OUTPATIENT
Start: 2017-06-22 | End: 2017-06-22

## 2017-06-22 RX ORDER — ESOMEPRAZOLE MAGNESIUM 20 MG/1
20 TABLET, DELAYED RELEASE ORAL DAILY
Qty: 30 TAB | Refills: 0 | Status: SHIPPED | OUTPATIENT
Start: 2017-06-22 | End: 2017-12-04

## 2017-06-22 RX ORDER — SODIUM CHLORIDE 9 MG/ML
INJECTION, SOLUTION INTRAVENOUS
Status: DISCONTINUED | OUTPATIENT
Start: 2017-06-22 | End: 2017-06-22 | Stop reason: HOSPADM

## 2017-06-22 RX ADMIN — PROPOFOL 50 MG: 10 INJECTION, EMULSION INTRAVENOUS at 16:02

## 2017-06-22 RX ADMIN — SODIUM CHLORIDE 40 MG: 9 INJECTION, SOLUTION INTRAMUSCULAR; INTRAVENOUS; SUBCUTANEOUS at 11:13

## 2017-06-22 RX ADMIN — LIDOCAINE HYDROCHLORIDE 40 MG: 20 INJECTION, SOLUTION EPIDURAL; INFILTRATION; INTRACAUDAL; PERINEURAL at 16:00

## 2017-06-22 RX ADMIN — METOPROLOL SUCCINATE 100 MG: 50 TABLET, EXTENDED RELEASE ORAL at 11:12

## 2017-06-22 RX ADMIN — PROPOFOL 50 MG: 10 INJECTION, EMULSION INTRAVENOUS at 16:04

## 2017-06-22 RX ADMIN — POTASSIUM CHLORIDE 40 MEQ: 750 TABLET, FILM COATED, EXTENDED RELEASE ORAL at 17:07

## 2017-06-22 RX ADMIN — ALPRAZOLAM 0.25 MG: 0.25 TABLET ORAL at 03:09

## 2017-06-22 RX ADMIN — DESMOPRESSIN ACETATE 100 MCG: 0.1 TABLET ORAL at 17:07

## 2017-06-22 RX ADMIN — SODIUM CHLORIDE: 9 INJECTION, SOLUTION INTRAVENOUS at 15:50

## 2017-06-22 RX ADMIN — AMLODIPINE BESYLATE 10 MG: 5 TABLET ORAL at 11:12

## 2017-06-22 RX ADMIN — ALPRAZOLAM 0.25 MG: 0.25 TABLET ORAL at 17:07

## 2017-06-22 RX ADMIN — PROPOFOL 50 MG: 10 INJECTION, EMULSION INTRAVENOUS at 16:06

## 2017-06-22 RX ADMIN — HYDROMORPHONE HYDROCHLORIDE 1 MG: 1 INJECTION, SOLUTION INTRAMUSCULAR; INTRAVENOUS; SUBCUTANEOUS at 01:03

## 2017-06-22 RX ADMIN — HYDROMORPHONE HYDROCHLORIDE 1 MG: 1 INJECTION, SOLUTION INTRAMUSCULAR; INTRAVENOUS; SUBCUTANEOUS at 03:32

## 2017-06-22 RX ADMIN — HYDROMORPHONE HYDROCHLORIDE 1 MG: 1 INJECTION, SOLUTION INTRAMUSCULAR; INTRAVENOUS; SUBCUTANEOUS at 08:52

## 2017-06-22 RX ADMIN — PROPOFOL 100 MG: 10 INJECTION, EMULSION INTRAVENOUS at 16:00

## 2017-06-22 RX ADMIN — Medication 10 ML: at 14:00

## 2017-06-22 RX ADMIN — Medication 10 ML: at 06:53

## 2017-06-22 NOTE — PERIOP NOTES
TRANSFER - OUT REPORT:    Verbal report given to Geneva Barreto RN (name) on Steven Chase  being transferred to [facility] [phone] (unit) for routine progression of care       Report consisted of patients Situation, Background, Assessment and   Recommendations(SBAR). Information from the following report(s) Procedure Summary and Recent Results was reviewed with the receiving nurse. Opportunity for questions and clarification was provided.       Patient transported with:   [employer]

## 2017-06-22 NOTE — PERIOP NOTES

## 2017-06-22 NOTE — PROGRESS NOTES
Bedside and Verbal shift change report given to Kendra W Cammie Ovalles (oncoming nurse) by Dann Ly (offgoing nurse). Report included the following information SBAR, Kardex and Recent Results.

## 2017-06-22 NOTE — ROUTINE PROCESS
Bertha Steele  1985  [id number]    Situation:  Verbal report received from: Yuri Callejas  Procedure: Procedure(s):  ESOPHAGOGASTRODUODENOSCOPY (EGD)  ESOPHAGOGASTRODUODENAL (EGD) BIOPSY    Background:    Preoperative diagnosis: Epigastric pain  Postoperative diagnosis: Erosive Gastritis  Pancreatic Crest    :  Dr. Luke Browning  Assistant(s): Endoscopy Technician-1: Radha Tijerina  Endoscopy RN-1: Ela Corral RN    Specimens:   ID Type Source Tests Collected by Time Destination   1 : antrum Preservative Gastric  Julieta Medina MD 6/22/2017 1603 Pathology   2 : pancreatic crest Preservative Gastric  Julieta Medina MD 6/22/2017 1604 Pathology     H. Pylori  no    Assessment:  Intra-procedure medications     Anesthesia gave intra-procedure sedation and medications, see anesthesia flow sheet yes    Intravenous fluids: NS@ KVO     Vital signs stable     Abdominal assessment: round and soft     Recommendation:  Discharge patient per MD order.   Return to floor  Family or Friend   Permission to share finding with family or friend yes

## 2017-06-22 NOTE — ANESTHESIA POSTPROCEDURE EVALUATION
Post-Anesthesia Evaluation and Assessment    Patient: Lindsay Milan MRN: [id number]  SSN: [id number]    YOB: 1985  Age: 36 y.o. Sex: male       Cardiovascular Function/Vital Signs  Visit Vitals    /77    Pulse 94    Temp 36.7 °C (98 °F)    Resp 20    Ht 5' 9\" (1.753 m)    Wt 89.4 kg (197 lb)    SpO2 96%    BMI 29.09 kg/m2       Patient is status post MAC anesthesia for Procedure(s):  ESOPHAGOGASTRODUODENOSCOPY (EGD)  ESOPHAGOGASTRODUODENAL (EGD) BIOPSY. Nausea/Vomiting: None    Postoperative hydration reviewed and adequate. Pain:  Pain Scale 1: Numeric (0 - 10) (06/22/17 1626)  Pain Intensity 1: 0 (06/22/17 1626)   Managed    Neurological Status:   Neuro  Neurologic State: Alert;Irritable (06/22/17 0800)  Orientation Level: Oriented X4 (06/22/17 0800)  Cognition: Appropriate for age attention/concentration; Follows commands; Impulsive (06/22/17 0800)  Speech: Clear (06/22/17 0800)  Assessment L Pupil: Brisk;Round (06/22/17 0800)  Size L Pupil (mm): 3 (06/22/17 0800)  Assessment R Pupil: Brisk;Round (06/22/17 0800)  Size R Pupil (mm): 3 (06/22/17 0800)  LUE Motor Response: Purposeful (06/22/17 0800)  LLE Motor Response: Purposeful (06/22/17 0800)  RUE Motor Response: Purposeful (06/22/17 0800)  RLE Motor Response: Purposeful (06/22/17 0800)   At baseline    Mental Status and Level of Consciousness: Arousable    Pulmonary Status:   O2 Device: Room air (06/22/17 1626)   Adequate oxygenation and airway patent    Complications related to anesthesia: None    Post-anesthesia assessment completed.  No concerns    Signed By: Glenna Roblero MD     June 22, 2017

## 2017-06-22 NOTE — DISCHARGE SUMMARY
Discharge Summary       PATIENT ID: Ladi Varela  MRN: [id number]   YOB: 1985    DATE OF ADMISSION: 2017 12:05 PM    DATE OF DISCHARGE: 17   PRIMARY CARE PROVIDER: Redd Buitrago MD     ATTENDING PHYSICIAN: Will New MD, S  DISCHARGING PROVIDER: Will New MD, S    To contact this individual call [phone] and ask the  to page. If unavailable ask to be transferred the Adult Hospitalist Department. CONSULTATIONS: IP CONSULT TO NEPHROLOGY  IP CONSULT TO UROLOGY  IP CONSULT TO GASTROENTEROLOGY  IP CONSULT TO HOSPITALIST    PROCEDURES/SURGERIES:    EGD with biopsy   CTA abd/pelvis w contrast    ADMITTING DIAGNOSES & HOSPITAL COURSE:   24 yo man with HTN and diabetes insipidus presented from home on 17 with epigastric abdominal pain. Work-up revealed bladder distention with right hydroureteronephrosis. Abdominal pain (POA) suspect this is due to bladder distention and gastritis  - recommended espana catheter placement but he is adamantly refusing, citing that he is urinating fine  - states he has worsening/new pain today in the epigastrium  - acute abd series  unremarkable  - pain control  - GI consulted    Gastritis  - s/p EGD on ; pathology pending  - start PPI  - GERD diet advised     R hydronephrosis: (POA)  -refusing espana as above  -continue doxazosin  -urology consulted, f/u as an outpatient     DI w/ hypernatremia (POA) chronic  -continue home desmopressin  -nephrology consulted     HTN: elevated here likely confounded by pain  -continue current meds for now and monitor  - better controlled now    Anxiety - resumed Xanax; Psychiatr referral outpatient recommended      DISCHARGE DIAGNOSES / PLAN:      Stable for discharge home to self-care. Follow up with PCP, GI, Urology.        PENDING TEST RESULTS:   At the time of discharge the following test results are still pendin/22 biopsy result    FOLLOW UP APPOINTMENTS:    Follow-up Information     Follow up With Details Comments Ginette Mcdonald MD In 1 week hospital follow up [address] Milagros Gallegos MD In 2 weeks GI; hospital follow up  [address] [facility]  [address] [facility]  [phone]      [facility] In 1 week Urology; hospital follow up [address]  [phone]           ADDITIONAL CARE RECOMMENDATIONS:   1. Take medication as prescribed. 2. Keep appointment(s) as recommended/scheduled. 3. Your PCP may want to refer you to Psychiatry due to your anxiety. DIET: Cardiac and GERD- appropriate diet    ACTIVITY: Activity as tolerated    WOUND CARE: none    EQUIPMENT needed: none      DISCHARGE MEDICATIONS:  Current Discharge Medication List      START taking these medications    Details   nicotine (NICODERM CQ) 21 mg/24 hr 1 Patch by TransDERmal route daily for 30 days. Indications: SMOKING CESSATION  Qty: 30 Patch, Refills: 0      esomeprazole magnesium (NEXIUM 24HR) 20 mg TbEC Take 20 mg by mouth daily. Indications: gastritis  Qty: 30 Tab, Refills: 0         CONTINUE these medications which have NOT CHANGED    Details   albuterol (PROVENTIL HFA, VENTOLIN HFA, PROAIR HFA) 90 mcg/actuation inhaler Take 2 Puffs by inhalation every six (6) hours as needed for Wheezing or Shortness of Breath. amLODIPine (NORVASC) 10 mg tablet Take 10 mg by mouth daily. ondansetron (ZOFRAN ODT) 8 mg disintegrating tablet Take 8 mg by mouth every eight (8) hours as needed for Nausea. HYDROcodone-acetaminophen (NORCO) 5-325 mg per tablet Take 1 Tab by mouth every six (6) hours as needed for Pain. doxazosin (CARDURA) 2 mg tablet Take 2 mg by mouth nightly. metoprolol succinate (TOPROL XL) 100 mg tablet Take 100 mg by mouth daily. desmopressin (DDAVP) 0.1 mg tablet Take 0.1 mg by mouth daily.       ALPRAZolam (XANAX) 0.25 mg tablet Take 0.25 mg by mouth three (3) times daily as needed for Anxiety. NOTIFY YOUR PHYSICIAN FOR ANY OF THE FOLLOWING:   Fever over 101 degrees for 24 hours. Chest pain, shortness of breath, fever, chills, nausea, vomiting, diarrhea, change in mentation, falling, weakness, bleeding. Severe pain or pain not relieved by medications. Or, any other signs or symptoms that you may have questions about.     DISPOSITION:  x  Home With:   OT  PT  HH  RN       Long term SNF/Inpatient Rehab    Independent/assisted living    Hospice    Other:       PATIENT CONDITION AT DISCHARGE:     Functional status    Poor     Deconditioned    x Independent      Cognition   x  Lucid     Forgetful     Dementia      Catheters/lines (plus indication)    Gonzalez     PICC     PEG    x None      Code status   x  Full code     DNR      PHYSICAL EXAMINATION AT DISCHARGE:  Visit Vitals    /83    Pulse 78    Temp 98 °F (36.7 °C)    Resp 20    Ht 5' 9\" (1.753 m)    Wt 89.4 kg (197 lb)    SpO2 100%    BMI 29.09 kg/m2   Gen: awake, no acute distress  HEENT: anicteric sclerae, normal conjunctiva, oropharynx clear, MM moist  Neck: supple  Heart: RR, tachycardic, no MRG, no JVD, no peripheral edema  Lungs: CTA b/l, non-labored respirations  Abd: +BS, soft, NT, ND  Skin: dry, no rash  Neuro: AOx3, NFD      Labs  Recent Results (from the past 24 hour(s))   METABOLIC PANEL, BASIC    Collection Time: 06/22/17  1:11 AM   Result Value Ref Range    Sodium 140 136 - 145 mmol/L    Potassium 2.9 (L) 3.5 - 5.1 mmol/L    Chloride 100 97 - 108 mmol/L    CO2 31 21 - 32 mmol/L    Anion gap 9 5 - 15 mmol/L    Glucose 100 65 - 100 mg/dL    BUN 7 6 - 20 MG/DL    Creatinine 1.01 0.70 - 1.30 MG/DL    BUN/Creatinine ratio 7 (L) 12 - 20      GFR est AA >60 >60 ml/min/1.73m2    GFR est non-AA >60 >60 ml/min/1.73m2    Calcium 8.9 8.5 - 10.1 MG/DL   MAGNESIUM    Collection Time: 06/22/17  1:11 AM   Result Value Ref Range    Magnesium 1.9 1.6 - 2.4 mg/dL   PHOSPHORUS    Collection Time: 06/22/17  1:11 AM   Result Value Ref Range    Phosphorus 2.9 2.6 - 4.7 MG/DL       CHRONIC MEDICAL DIAGNOSES:  Problem List as of 6/22/2017  Date Reviewed: 6/22/2017          Codes Class Noted - Resolved    Gastritis ICD-10-CM: K29.70  ICD-9-CM: 535.50  6/22/2017 - Present        Bladder dysfunction (Chronic) ICD-10-CM: N31.9  ICD-9-CM: 596.59  6/22/2017 - Present        Hydronephrosis, right ICD-10-CM: N13.30  ICD-9-CM: 581  6/22/2017 - Present        Hypernatremia ICD-10-CM: E87.0  ICD-9-CM: 276.0  6/22/2017 - Present        Hypertension (Chronic) ICD-10-CM: I10  ICD-9-CM: 401.9  6/20/2017 - Present        Diabetes insipidus ([facility] [address] (Chronic) ICD-10-CM: E23.2  ICD-9-CM: 253.5  6/20/2017 - Present        * (Principal)Abdominal pain ICD-10-CM: R10.9  ICD-9-CM: 789.00  6/20/2017 - Present              Greater than 30 minutes were spent with the patient on counseling and coordination of care.     Signed:   Claudette Massed, MD  6/22/2017  6:30 PM

## 2017-06-22 NOTE — PERIOP NOTES
TRANSFER - IN REPORT:    Verbal report received from Yuri Leahy, [address] [place] [address] (name) on Bertha Steele  being received from [phone] (unit) for ordered procedure      Report consisted of patients Situation, Background, Assessment and   Recommendations(SBAR). Information from the following report(s) SBAR, ED Summary, [facility] and Recent Results was reviewed with the receiving nurse. Opportunity for questions and clarification was provided. Assessment completed upon patients arrival to unit and care assumed.

## 2017-06-22 NOTE — ANESTHESIA PREPROCEDURE EVALUATION
Anesthetic History   No history of anesthetic complications            Review of Systems / Medical History  Patient summary reviewed, nursing notes reviewed and pertinent labs reviewed    Pulmonary          Smoker  Asthma : well controlled       Neuro/Psych         Psychiatric history     Cardiovascular    Hypertension              Exercise tolerance: >4 METS     GI/Hepatic/Renal               Comments: Epigastric pain Endo/Other    Diabetes         Other Findings            Physical Exam    Airway  Mallampati: II  TM Distance: > 6 cm  Neck ROM: normal range of motion   Mouth opening: Normal     Cardiovascular    Rhythm: regular  Rate: normal         Dental  No notable dental hx       Pulmonary  Breath sounds clear to auscultation               Abdominal  Abdominal exam normal       Other Findings            Anesthetic Plan    ASA: 3  Anesthesia type: MAC          Induction: Intravenous  Anesthetic plan and risks discussed with: Patient

## 2017-06-22 NOTE — PROGRESS NOTES
Bedside shift change report given to Yg Bronson RN (oncoming nurse) by Edmond Groves RN (offgoing nurse). Report included the following information SBAR, Kardex, ED Summary, Procedure Summary, Intake/Output, MAR, Accordion, Recent Results, Med Rec Status and Cardiac Rhythm NSR, Sinus Tach.

## 2017-06-22 NOTE — PROGRESS NOTES
NAME: Serenity Avilez        :  1985        MRN:  [id number]        Assessment :    Plan:  --Hydro  Hypokalemia  HTN --Creatinine back to normal.  Replete K PO. Per urology, pt declines Gonzalez. I will sign off. Please call if any questions. Subjective:     Chief Complaint:  \" when can I go home? \"  No HA. No N/V. No dyspnea. Review of Systems:    Symptom Y/N Comments  Symptom Y/N Comments   Fever/Chills    Chest Pain     Poor Appetite    Edema     Cough    Abdominal Pain     Sputum    Joint Pain     SOB/MODI    Pruritis/Rash     Nausea/vomit    Tolerating PT/OT     Diarrhea    Tolerating Diet     Constipation    Other       Could not obtain due to:      Objective:     VITALS:   Last 24hrs VS reviewed since prior progress note.  Most recent are:  Visit Vitals    BP (!) 179/105 (BP 1 Location: Left arm, BP Patient Position: Supine)    Pulse 91    Temp 97.7 °F (36.5 °C)    Resp 20    Ht 5' 9\" (1.753 m)    Wt 89.5 kg (197 lb 5 oz)    SpO2 100%    BMI 29.14 kg/m2       Intake/Output Summary (Last 24 hours) at 17 1220  Last data filed at 17 0330   Gross per 24 hour   Intake             1000 ml   Output             2800 ml   Net            -1800 ml      Telemetry Reviewed:     PHYSICAL EXAM:  General: NAD  CTA  RRR  abd soft  No edema      Lab Data Reviewed: (see below)    Medications Reviewed: (see below)    PMH/SH reviewed - no change compared to H&P  ________________________________________________________________________  Care Plan discussed with:  Patient     Family      RN     Care Manager                    Consultant:          Comments   >50% of visit spent in counseling and coordination of care       ________________________________________________________________________  Erich Marquez MD     Procedures: see electronic medical records for all procedures/Xrays and details which  were not copied into this note but were reviewed prior to creation of Plan. LABS:  Recent Labs      06/21/17   0322  06/20/17   1230   WBC  7.2  6.6   HGB  15.9  17.0   HCT  47.1  49.4   PLT  224  281     Recent Labs      06/22/17   0111  06/21/17   0322  06/20/17   1230   NA  140  146*  148*   K  2.9*  3.5  3.3*   CL  100  109*  113*   CO2  31  29  27   BUN  7  7  10   CREA  1.01  1.17  1.15   GLU  100  91  113*   CA  8.9  9.2  9.6   MG  1.9  2.3   --    PHOS  2.9  4.2   --      Recent Labs      06/21/17   0322  06/20/17   1230   SGOT  18  26   AP  81  89   TP  7.6  8.9*   ALB  4.3  4.6   GLOB  3.3  4.3*   LPSE   --   239     No results for input(s): INR, PTP, APTT in the last 72 hours. No lab exists for component: INREXT   No results for input(s): FE, TIBC, PSAT, FERR in the last 72 hours. No results found for: FOL, RBCF   No results for input(s): PH, PCO2, PO2 in the last 72 hours. No results for input(s): CPK, CKMB in the last 72 hours.     No lab exists for component: TROPONINI  No components found for: [place]  Lab Results   Component Value Date/Time    Color YELLOW/STRAW 06/20/2017 12:30 PM    Appearance CLOUDY 06/20/2017 12:30 PM    Specific gravity 1.005 06/20/2017 12:30 PM    pH (UA) 7.0 06/20/2017 12:30 PM    Protein NEGATIVE  06/20/2017 12:30 PM    Glucose NEGATIVE  06/20/2017 12:30 PM    Ketone NEGATIVE  06/20/2017 12:30 PM    Bilirubin NEGATIVE  06/20/2017 12:30 PM    Urobilinogen 0.2 06/20/2017 12:30 PM    Nitrites NEGATIVE  06/20/2017 12:30 PM    Leukocyte Esterase NEGATIVE  06/20/2017 12:30 PM    Epithelial cells FEW 06/20/2017 12:30 PM    Bacteria NEGATIVE  06/20/2017 12:30 PM    WBC 0-4 06/20/2017 12:30 PM    RBC 0-5 06/20/2017 12:30 PM       MEDICATIONS:  Current Facility-Administered Medications   Medication Dose Route Frequency    doxazosin (CARDURA) tablet 4 mg  4 mg Oral QHS    potassium chloride SR (KLOR-CON 10) tablet 40 mEq  40 mEq Oral NOW    HYDROmorphone (PF) (DILAUDID) injection 1 mg  1 mg IntraVENous Q3H PRN    pantoprazole (PROTONIX) 40 mg in sodium chloride 0.9 % 10 mL injection  40 mg IntraVENous DAILY    nicotine (NICODERM CQ) 21 mg/24 hr patch 1 Patch  1 Patch TransDERmal DAILY    ALPRAZolam (XANAX) tablet 0.25 mg  0.25 mg Oral TID PRN    amLODIPine (NORVASC) tablet 10 mg  10 mg Oral DAILY    desmopressin (DDAVP) tablet 100 mcg  0.1 mg Oral DAILY    metoprolol succinate (TOPROL-XL) XL tablet 100 mg  100 mg Oral DAILY    ondansetron (ZOFRAN ODT) tablet 8 mg  8 mg Oral Q8H PRN    hydrALAZINE (APRESOLINE) 20 mg/mL injection 10 mg  10 mg IntraVENous Q4H PRN    sodium chloride (NS) flush 5-10 mL  5-10 mL IntraVENous Q8H    sodium chloride (NS) flush 5-10 mL  5-10 mL IntraVENous PRN    acetaminophen (TYLENOL) tablet 650 mg  650 mg Oral Q4H PRN    HYDROcodone-acetaminophen (NORCO) 5-325 mg per tablet 1 Tab  1 Tab Oral Q4H PRN    diphenhydrAMINE (BENADRYL) injection 12.5 mg  12.5 mg IntraVENous Q4H PRN    ondansetron (ZOFRAN) injection 4 mg  4 mg IntraVENous Q4H PRN    magnesium hydroxide (MILK OF MAGNESIA) 400 mg/5 mL oral suspension 30 mL  30 mL Oral DAILY PRN    albuterol (PROVENTIL VENTOLIN) nebulizer solution 2.5 mg  2.5 mg Nebulization Q6H PRN    metoprolol (LOPRESSOR) injection 5 mg  5 mg IntraVENous Q6H PRN

## 2017-06-22 NOTE — PROCEDURES
[address]                 NAME:  Nicolás Gomez   :   1985   MRN:   [id number]     Date/Time:  2017 4:07 PM    Esophagogastroduodenoscopy (EGD) Procedure Note    :  Edie Gong MD    Referring Provider:  Joon Tejada MD    Anethesia/Sedation:  MAC anesthesia Propofol    Preoperative diagnosis: Epigastric pain    Postoperative diagnosis: Erosive Gastritis  Pancreatic Crest    Procedure Details     After infom consent was obtained for the procedure, with all risks and benefits of procedure explained the patient was taken to the endoscopy suite and placed in the left lateral decubitus position. Following sequential administration of sedation as per above, the [id number] gastroscope was inserted into the mouth and advanced under direct vision to second portion of the duodenum. A careful inspection was made as the gastroscope was withdrawn, including a retroflexed view of the proximal stomach; findings and interventions are described below. Findings:  Esophagus:normal  Stomach:erosive gastritis  and a pancreatic rest note din antrum, biopsies done  Duodenum/jejunum:normal      Therapies:  none    Specimens: antral biopsies, pancreatic rest biopsy           EBL: None    Complications:   None; patient tolerated the procedure well. Impression:    See Postoperative diagnosis above    Recommendations:  -Acid suppression with a proton pump inhibitor. , -Await pathology. , -ADA diet. OK to discharge home from GI standpoint if no significant pain.       Edie Gong MD

## 2017-07-04 ENCOUNTER — HOSPITAL ENCOUNTER (EMERGENCY)
Age: 32
Discharge: HOME | End: 2017-07-04
Attending: EMERGENCY MEDICINE
Payer: MEDICAID

## 2017-07-04 ENCOUNTER — APPOINTMENT (OUTPATIENT)
Dept: GENERAL RADIOLOGY | Age: 32
End: 2017-07-04
Attending: EMERGENCY MEDICINE
Payer: MEDICAID

## 2017-07-04 ENCOUNTER — APPOINTMENT (OUTPATIENT)
Dept: CT IMAGING | Age: 32
End: 2017-07-04
Attending: EMERGENCY MEDICINE
Payer: MEDICAID

## 2017-07-04 VITALS
WEIGHT: 185 LBS | DIASTOLIC BLOOD PRESSURE: 103 MMHG | BODY MASS INDEX: 27.4 KG/M2 | OXYGEN SATURATION: 95 % | HEIGHT: 69 IN | TEMPERATURE: 97.7 F | SYSTOLIC BLOOD PRESSURE: 157 MMHG | HEART RATE: 87 BPM | RESPIRATION RATE: 16 BRPM

## 2017-07-04 DIAGNOSIS — I10 ESSENTIAL HYPERTENSION: ICD-10-CM

## 2017-07-04 DIAGNOSIS — K85.80 OTHER ACUTE PANCREATITIS WITHOUT INFECTION OR NECROSIS: ICD-10-CM

## 2017-07-04 DIAGNOSIS — K29.90 GASTRITIS AND DUODENITIS: Primary | ICD-10-CM

## 2017-07-04 LAB
ALBUMIN SERPL BCP-MCNC: 4.4 G/DL (ref 3.5–5)
ALBUMIN/GLOB SERPL: 1 {RATIO} (ref 1.1–2.2)
ALP SERPL-CCNC: 95 U/L (ref 45–117)
ALT SERPL-CCNC: 45 U/L (ref 12–78)
ANION GAP BLD CALC-SCNC: 7 MMOL/L (ref 5–15)
APPEARANCE UR: CLEAR
AST SERPL W P-5'-P-CCNC: 30 U/L (ref 15–37)
BACTERIA URNS QL MICRO: NEGATIVE /HPF
BASOPHILS # BLD AUTO: 0 K/UL (ref 0–0.1)
BASOPHILS # BLD: 1 % (ref 0–1)
BILIRUB SERPL-MCNC: 0.2 MG/DL (ref 0.2–1)
BILIRUB UR QL: NEGATIVE
BUN SERPL-MCNC: 6 MG/DL (ref 6–20)
BUN/CREAT SERPL: 6 (ref 12–20)
CALCIUM SERPL-MCNC: 9.6 MG/DL (ref 8.5–10.1)
CHLORIDE SERPL-SCNC: 116 MMOL/L (ref 97–108)
CO2 SERPL-SCNC: 27 MMOL/L (ref 21–32)
COLOR UR: NORMAL
CREAT SERPL-MCNC: 0.97 MG/DL (ref 0.7–1.3)
D DIMER PPP FEU-MCNC: 0.21 MG/L FEU (ref 0–0.65)
EOSINOPHIL # BLD: 0.2 K/UL (ref 0–0.4)
EOSINOPHIL NFR BLD: 3 % (ref 0–7)
EPITH CASTS URNS QL MICRO: NORMAL /LPF
ERYTHROCYTE [DISTWIDTH] IN BLOOD BY AUTOMATED COUNT: 13.9 % (ref 11.5–14.5)
GLOBULIN SER CALC-MCNC: 4.3 G/DL (ref 2–4)
GLUCOSE SERPL-MCNC: 113 MG/DL (ref 65–100)
GLUCOSE UR STRIP.AUTO-MCNC: NEGATIVE MG/DL
HCT VFR BLD AUTO: 47.3 % (ref 36.6–50.3)
HGB BLD-MCNC: 16.1 G/DL (ref 12.1–17)
HGB UR QL STRIP: NEGATIVE
HYALINE CASTS URNS QL MICRO: NORMAL /LPF (ref 0–5)
KETONES UR QL STRIP.AUTO: NEGATIVE MG/DL
LEUKOCYTE ESTERASE UR QL STRIP.AUTO: NEGATIVE
LIPASE SERPL-CCNC: 695 U/L (ref 73–393)
LYMPHOCYTES # BLD AUTO: 21 % (ref 12–49)
LYMPHOCYTES # BLD: 1 K/UL (ref 0.8–3.5)
MCH RBC QN AUTO: 30.9 PG (ref 26–34)
MCHC RBC AUTO-ENTMCNC: 34 G/DL (ref 30–36.5)
MCV RBC AUTO: 90.8 FL (ref 80–99)
MONOCYTES # BLD: 0.4 K/UL (ref 0–1)
MONOCYTES NFR BLD AUTO: 7 % (ref 5–13)
NEUTS SEG # BLD: 3.4 K/UL (ref 1.8–8)
NEUTS SEG NFR BLD AUTO: 68 % (ref 32–75)
NITRITE UR QL STRIP.AUTO: NEGATIVE
PH UR STRIP: 6.5 [PH] (ref 5–8)
PLATELET # BLD AUTO: 355 K/UL (ref 150–400)
POTASSIUM SERPL-SCNC: 3.9 MMOL/L (ref 3.5–5.1)
PROT SERPL-MCNC: 8.7 G/DL (ref 6.4–8.2)
PROT UR STRIP-MCNC: NEGATIVE MG/DL
RBC # BLD AUTO: 5.21 M/UL (ref 4.1–5.7)
RBC #/AREA URNS HPF: NORMAL /HPF (ref 0–5)
SODIUM SERPL-SCNC: 150 MMOL/L (ref 136–145)
SP GR UR REFRACTOMETRY: 1 (ref 1–1.03)
UROBILINOGEN UR QL STRIP.AUTO: 0.2 EU/DL (ref 0.2–1)
WBC # BLD AUTO: 5 K/UL (ref 4.1–11.1)
WBC URNS QL MICRO: NORMAL /HPF (ref 0–4)

## 2017-07-04 PROCEDURE — 83690 ASSAY OF LIPASE: CPT | Performed by: EMERGENCY MEDICINE

## 2017-07-04 PROCEDURE — 73610 X-RAY EXAM OF ANKLE: CPT

## 2017-07-04 PROCEDURE — 51798 US URINE CAPACITY MEASURE: CPT

## 2017-07-04 PROCEDURE — 96375 TX/PRO/DX INJ NEW DRUG ADDON: CPT

## 2017-07-04 PROCEDURE — 85025 COMPLETE CBC W/AUTO DIFF WBC: CPT | Performed by: EMERGENCY MEDICINE

## 2017-07-04 PROCEDURE — 99285 EMERGENCY DEPT VISIT HI MDM: CPT

## 2017-07-04 PROCEDURE — 74176 CT ABD & PELVIS W/O CONTRAST: CPT

## 2017-07-04 PROCEDURE — 96374 THER/PROPH/DIAG INJ IV PUSH: CPT

## 2017-07-04 PROCEDURE — 85379 FIBRIN DEGRADATION QUANT: CPT | Performed by: EMERGENCY MEDICINE

## 2017-07-04 PROCEDURE — 81001 URINALYSIS AUTO W/SCOPE: CPT | Performed by: EMERGENCY MEDICINE

## 2017-07-04 PROCEDURE — 74011250636 HC RX REV CODE- 250/636: Performed by: EMERGENCY MEDICINE

## 2017-07-04 PROCEDURE — 74011000250 HC RX REV CODE- 250: Performed by: EMERGENCY MEDICINE

## 2017-07-04 PROCEDURE — 80053 COMPREHEN METABOLIC PANEL: CPT | Performed by: EMERGENCY MEDICINE

## 2017-07-04 PROCEDURE — 36415 COLL VENOUS BLD VENIPUNCTURE: CPT | Performed by: EMERGENCY MEDICINE

## 2017-07-04 RX ORDER — ONDANSETRON 2 MG/ML
8 INJECTION INTRAMUSCULAR; INTRAVENOUS
Status: COMPLETED | OUTPATIENT
Start: 2017-07-04 | End: 2017-07-04

## 2017-07-04 RX ORDER — HYDROMORPHONE HYDROCHLORIDE 1 MG/ML
0.5 INJECTION, SOLUTION INTRAMUSCULAR; INTRAVENOUS; SUBCUTANEOUS ONCE
Status: COMPLETED | OUTPATIENT
Start: 2017-07-04 | End: 2017-07-04

## 2017-07-04 RX ORDER — LABETALOL HYDROCHLORIDE 5 MG/ML
20 INJECTION, SOLUTION INTRAVENOUS
Status: COMPLETED | OUTPATIENT
Start: 2017-07-04 | End: 2017-07-04

## 2017-07-04 RX ADMIN — ONDANSETRON 8 MG: 2 INJECTION INTRAMUSCULAR; INTRAVENOUS at 01:29

## 2017-07-04 RX ADMIN — LABETALOL HYDROCHLORIDE 20 MG: 5 INJECTION, SOLUTION INTRAVENOUS at 03:29

## 2017-07-04 RX ADMIN — HYDROMORPHONE HYDROCHLORIDE 0.5 MG: 1 INJECTION, SOLUTION INTRAMUSCULAR; INTRAVENOUS; SUBCUTANEOUS at 01:30

## 2017-07-04 NOTE — ED NOTES
Assumed care of patient from [address] [place] [address]. Patient resting on stretcher, on monitor x 2. Will continue to monitor BP.

## 2017-07-04 NOTE — DISCHARGE INSTRUCTIONS
High Blood Pressure: Care Instructions  Your Care Instructions  If your blood pressure is usually above 140/90, you have high blood pressure, or hypertension. That means the top number is 140 or higher or the bottom number is 90 or higher, or both. Despite what a lot of people think, high blood pressure usually doesn't cause headaches or make you feel dizzy or lightheaded. It usually has no symptoms. But it does increase your risk for heart attack, stroke, and kidney or eye damage. The higher your blood pressure, the more your risk increases. Your doctor will give you a goal for your blood pressure. Your goal will be based on your health and your age. An example of a goal is to keep your blood pressure below 140/90. Lifestyle changes, such as eating healthy and being active, are always important to help lower blood pressure. You might also take medicine to reach your blood pressure goal.  Follow-up care is a key part of your treatment and safety. Be sure to make and go to all appointments, and call your doctor if you are having problems. It's also a good idea to know your test results and keep a list of the medicines you take. How can you care for yourself at home? Medical treatment  · If you stop taking your medicine, your blood pressure will go back up. You may take one or more types of medicine to lower your blood pressure. Be safe with medicines. Take your medicine exactly as prescribed. Call your doctor if you think you are having a problem with your medicine. · Talk to your doctor before you start taking aspirin every day. Aspirin can help certain people lower their risk of a heart attack or stroke. But taking aspirin isn't right for everyone, because it can cause serious bleeding. · See your doctor regularly. You may need to see the doctor more often at first or until your blood pressure comes down.   · If you are taking blood pressure medicine, talk to your doctor before you take decongestants or anti-inflammatory medicine, such as ibuprofen. Some of these medicines can raise blood pressure. · Learn how to check your blood pressure at home. Lifestyle changes  · Stay at a healthy weight. This is especially important if you put on weight around the waist. Losing even 10 pounds can help you lower your blood pressure. · If your doctor recommends it, get more exercise. Walking is a good choice. Bit by bit, increase the amount you walk every day. Try for at least 30 minutes on most days of the week. You also may want to swim, bike, or do other activities. · Avoid or limit alcohol. Talk to your doctor about whether you can drink any alcohol. · Try to limit how much sodium you eat to less than 2,300 milligrams (mg) a day. Your doctor may ask you to try to eat less than 1,500 mg a day. · Eat plenty of fruits (such as bananas and oranges), vegetables, legumes, whole grains, and low-fat dairy products. · Lower the amount of saturated fat in your diet. Saturated fat is found in animal products such as milk, cheese, and meat. Limiting these foods may help you lose weight and also lower your risk for heart disease. · Do not smoke. Smoking increases your risk for heart attack and stroke. If you need help quitting, talk to your doctor about stop-smoking programs and medicines. These can increase your chances of quitting for good. When should you call for help? Call 911 anytime you think you may need emergency care. This may mean having symptoms that suggest that your blood pressure is causing a serious heart or blood vessel problem. Your blood pressure may be over 180/110. For example, call 911 if:  · You have symptoms of a heart attack. These may include:  ¨ Chest pain or pressure, or a strange feeling in the chest.  ¨ Sweating. ¨ Shortness of breath. ¨ Nausea or vomiting. ¨ Pain, pressure, or a strange feeling in the back, neck, jaw, or upper belly or in one or both shoulders or arms.   ¨ Lightheadedness or sudden weakness. ¨ A fast or irregular heartbeat. · You have symptoms of a stroke. These may include:  ¨ Sudden numbness, tingling, weakness, or loss of movement in your face, arm, or leg, especially on only one side of your body. ¨ Sudden vision changes. ¨ Sudden trouble speaking. ¨ Sudden confusion or trouble understanding simple statements. ¨ Sudden problems with walking or balance. ¨ A sudden, severe headache that is different from past headaches. · You have severe back or belly pain. Do not wait until your blood pressure comes down on its own. Get help right away. Call your doctor now or seek immediate care if:  · Your blood pressure is much higher than normal (such as 180/110 or higher), but you don't have symptoms. · You think high blood pressure is causing symptoms, such as:  ¨ Severe headache. ¨ Blurry vision. Watch closely for changes in your health, and be sure to contact your doctor if:  · Your blood pressure measures 140/90 or higher at least 2 times. That means the top number is 140 or higher or the bottom number is 90 or higher, or both. · You think you may be having side effects from your blood pressure medicine. · Your blood pressure is usually normal, but it goes above normal at least 2 times. Where can you learn more? Go to http://ramana-tere.info/. Enter S483 in the search box to learn more about \"High Blood Pressure: Care Instructions. \"  Current as of: August 8, 2016  Content Version: 11.3  © 5136-6621 [employer]. Care instructions adapted under license by [employer] (which disclaims liability or warranty for this information). If you have questions about a medical condition or this instruction, always ask your healthcare professional. [place] [address] any warranty or liability for your use of this information. Gastritis: Care Instructions  Your Care Instructions    Gastritis is a sore and upset stomach. It happens when something irritates the stomach lining. Many things can cause it. These include an infection such as the flu or something you ate or drank. Medicines or a sore on the lining of the stomach (ulcer) also can cause it. Your belly may bloat and ache. You may belch, vomit, and feel sick to your stomach. You should be able to relieve the problem by taking medicine. And it may help to change your diet. If gastritis lasts, your doctor may prescribe medicine. Follow-up care is a key part of your treatment and safety. Be sure to make and go to all appointments, and call your doctor if you are having problems. It's also a good idea to know your test results and keep a list of the medicines you take. How can you care for yourself at home? · If your doctor prescribed antibiotics, take them as directed. Do not stop taking them just because you feel better. You need to take the full course of antibiotics. · Be safe with medicines. If your doctor prescribed medicine to decrease stomach acid, take it as directed. Call your doctor if you think you are having a problem with your medicine. · Do not take any other medicine, including over-the-counter pain relievers, without talking to your doctor first.  · If your doctor recommends over-the-counter medicine to reduce stomach acid, such as Pepcid AC, Prilosec, Tagamet HB, or Zantac 75, follow the directions on the label. · Drink plenty of fluids (enough so that your urine is light yellow or clear like water) to prevent dehydration. Choose water and other caffeine-free clear liquids. If you have kidney, heart, or liver disease and have to limit fluids, talk with your doctor before you increase the amount of fluids you drink. · Limit how much alcohol you drink. · Avoid coffee, tea, cola drinks, chocolate, and other foods with caffeine. They increase stomach acid. When should you call for help? Call 911 anytime you think you may need emergency care.  For example, call if:  · You vomit blood or what looks like coffee grounds. · You pass maroon or very bloody stools. Call your doctor now or seek immediate medical care if:  · You start breathing fast and have not produced urine in the last 8 hours. · You cannot keep fluids down. Watch closely for changes in your health, and be sure to contact your doctor if:  · You do not get better as expected. Where can you learn more? Go to http://ramana-tere.info/. Enter [id number] in the search box to learn more about \"Gastritis: Care Instructions. \"  Current as of: August 9, 2016  Content Version: 11.3  © 8629-2338 [employer]. Care instructions adapted under license by [employer] (which disclaims liability or warranty for this information). If you have questions about a medical condition or this instruction, always ask your healthcare professional. [place] [address] any warranty or liability for your use of this information. Pancreatitis: Care Instructions  Your Care Instructions    The pancreas is an organ behind the stomach. It makes hormones and enzymes to help your body digest food. But if these enzymes attack the pancreas, it can get inflamed. This is called pancreatitis. Most cases are caused by gallstones or by heavy alcohol use. If you take care of yourself at home, it will help you get better. It will also help you avoid more problems with your pancreas. Follow-up care is a key part of your treatment and safety. Be sure to make and go to all appointments, and call your doctor if you are having problems. It's also a good idea to know your test results and keep a list of the medicines you take. How can you care for yourself at home? · Drink clear liquids and eat bland foods until you feel better. [place] foods include rice, dry toast, and crackers. They also include bananas and applesauce.   · Eat a low-fat diet until your doctor says your pancreas is healed. · Do not drink alcohol. Tell your doctor if you need help to quit. Counseling, support groups, and sometimes medicines can help you stay sober. · Be safe with medicines. Read and follow all instructions on the label. ¨ If the doctor gave you a prescription medicine for pain, take it as prescribed. ¨ If you are not taking a prescription pain medicine, ask your doctor if you can take an over-the-counter medicine. · If your doctor prescribed antibiotics, take them as directed. Do not stop taking them just because you feel better. You need to take the full course of antibiotics. · Get extra rest until you feel better. To prevent future problems with your pancreas  · Do not drink alcohol. · Tell your doctors and pharmacist that you've had pancreatitis. They can help you avoid medicines that may cause this problem again. When should you call for help? Call your doctor now or seek immediate medical care if:  · You have new or severe belly pain. · You have a new or higher fever. · You can't keep fluid or medicines down. Watch closely for changes in your health, and be sure to contact your doctor if:  · The symptoms you had when you first started feeling sick come back. · You do not get better as expected. · You need help to stop drinking alcohol. Where can you learn more? Go to http://ramana-tere.info/. Enter E563 in the search box to learn more about \"Pancreatitis: Care Instructions. \"  Current as of: August 9, 2016  Content Version: 11.3  © 2823-9494 [employer]. Care instructions adapted under license by [employer] (which disclaims liability or warranty for this information). If you have questions about a medical condition or this instruction, always ask your healthcare professional. [place] [address] any warranty or liability for your use of this information. We hope that we have addressed all of your medical concerns.  The examination and treatment you received in the Emergency Department were for an emergent problem and were not intended as complete care. It is important that you follow up with your healthcare provider(s) for ongoing care. If your symptoms worsen or do not improve as expected, and you are unable to reach your usual health care provider(s), you should return to the Emergency Department. Today's healthcare is undergoing tremendous change, and patient satisfaction surveys are one of the many tools to assess the quality of medical care. You may receive a survey from the [employer] regarding your experience in the Emergency Department. I hope that your experience has been completely positive, particularly the medical care that I provided. As such, please participate in the survey; anything less than excellent does not meet my expectations or intentions. [facility] and [facility] participate in nationally recognized quality of care measures. If your blood pressure is greater than 120/80, as reported below, we urge that you seek medical care to address the potential of high blood pressure, commonly known as hypertension. Hypertension can be hereditary or can be caused by certain medical conditions, pain, stress, or \"white coat syndrome. \"       Please make an appointment with your health care provider(s) for follow up of your Emergency Department visit. VITALS:   Patient Vitals for the past 8 hrs:   Temp Pulse Resp BP SpO2   07/04/17 0430 - - - (!) 157/103 95 %   07/04/17 0400 - - - (!) 168/110 96 %   07/04/17 0228 97.7 °F (36.5 °C) 87 18 (!) 194/120 98 %   07/04/17 0050 - - - (!) 171/105 -   07/04/17 0049 97.7 °F (36.5 °C) 99 18 (!) 171/120 98 %          Thank you for allowing us to provide you with medical care today. We realize that you have many choices for your emergency care needs.   Please choose us in the future for any continued health care needs. [address] [place], [place] [address]: [phone]            Recent Results (from the past 24 hour(s))   CBC WITH AUTOMATED DIFF    Collection Time: 07/04/17  1:33 AM   Result Value Ref Range    WBC 5.0 4.1 - 11.1 K/uL    RBC 5.21 4.10 - 5.70 M/uL    HGB 16.1 12.1 - 17.0 g/dL    HCT 47.3 36.6 - 50.3 %    MCV 90.8 80.0 - 99.0 FL    MCH 30.9 26.0 - 34.0 PG    MCHC 34.0 30.0 - 36.5 g/dL    RDW 13.9 11.5 - 14.5 %    PLATELET 902 156 - 929 K/uL    NEUTROPHILS 68 32 - 75 %    LYMPHOCYTES 21 12 - 49 %    MONOCYTES 7 5 - 13 %    EOSINOPHILS 3 0 - 7 %    BASOPHILS 1 0 - 1 %    ABS. NEUTROPHILS 3.4 1.8 - 8.0 K/UL    ABS. LYMPHOCYTES 1.0 0.8 - 3.5 K/UL    ABS. MONOCYTES 0.4 0.0 - 1.0 K/UL    ABS. EOSINOPHILS 0.2 0.0 - 0.4 K/UL    ABS. BASOPHILS 0.0 0.0 - 0.1 K/UL   METABOLIC PANEL, COMPREHENSIVE    Collection Time: 07/04/17  1:33 AM   Result Value Ref Range    Sodium 150 (H) 136 - 145 mmol/L    Potassium 3.9 3.5 - 5.1 mmol/L    Chloride 116 (H) 97 - 108 mmol/L    CO2 27 21 - 32 mmol/L    Anion gap 7 5 - 15 mmol/L    Glucose 113 (H) 65 - 100 mg/dL    BUN 6 6 - 20 MG/DL    Creatinine 0.97 0.70 - 1.30 MG/DL    BUN/Creatinine ratio 6 (L) 12 - 20      GFR est AA >60 >60 ml/min/1.73m2    GFR est non-AA >60 >60 ml/min/1.73m2    Calcium 9.6 8.5 - 10.1 MG/DL    Bilirubin, total 0.2 0.2 - 1.0 MG/DL    ALT (SGPT) 45 12 - 78 U/L    AST (SGOT) 30 15 - 37 U/L    Alk.  phosphatase 95 45 - 117 U/L    Protein, total 8.7 (H) 6.4 - 8.2 g/dL    Albumin 4.4 3.5 - 5.0 g/dL    Globulin 4.3 (H) 2.0 - 4.0 g/dL    A-G Ratio 1.0 (L) 1.1 - 2.2     LIPASE    Collection Time: 07/04/17  1:33 AM   Result Value Ref Range    Lipase 695 (H) 73 - 393 U/L   URINALYSIS W/MICROSCOPIC    Collection Time: 07/04/17  1:33 AM   Result Value Ref Range    Color YELLOW/STRAW      Appearance CLEAR CLEAR      Specific gravity 1.005 1.003 - 1.030      pH (UA) 6.5 5.0 - 8.0      Protein NEGATIVE  NEG mg/dL    Glucose NEGATIVE  NEG mg/dL    Ketone NEGATIVE  NEG mg/dL    Bilirubin NEGATIVE  NEG      Blood NEGATIVE  NEG      Urobilinogen 0.2 0.2 - 1.0 EU/dL    Nitrites NEGATIVE  NEG      Leukocyte Esterase NEGATIVE  NEG      WBC 0-4 0 - 4 /hpf    RBC 0-5 0 - 5 /hpf    Epithelial cells FEW FEW /lpf    Bacteria NEGATIVE  NEG /hpf    Hyaline cast 0-2 0 - 5 /lpf   D DIMER    Collection Time: 07/04/17  1:33 AM   Result Value Ref Range    D-dimer 0.21 0.00 - 0.65 mg/L FEU       Xr Ankle Rt Min 3 V    Result Date: 7/4/2017  EXAM:  XR ANKLE RT MIN 3 V INDICATION:  ankle swelling. COMPARISON: None. FINDINGS: Three views of the right ankle demonstrate no fracture or disruption of the ankle mortise. There is no other acute osseous or articular abnormality. There is medial soft tissue swelling. IMPRESSION: Soft tissue swelling. No evidence of fracture. Ct Abd Pelv Wo Cont    Result Date: 7/4/2017  EXAM:  CT ABD PELV WO CONT INDICATION: left flank pain COMPARISON: CT 6/20/2017 CONTRAST:  none TECHNIQUE: Thin axial images were obtained through the abdomen and pelvis. Coronal and sagittal reconstructions were generated. Oral contrast was not administered. CT dose reduction was achieved through use of a standardized protocol tailored for this examination and automatic exposure control for dose modulation. The absence of intravenous contrast material reduces the sensitivity for evaluation of the solid parenchymal organs of the abdomen. FINDINGS: LUNG BASES: Clear. INCIDENTALLY IMAGED HEART AND MEDIASTINUM: Unremarkable. LIVER: No mass or biliary dilatation. GALLBLADDER: Unremarkable. SPLEEN: No mass. PANCREAS: No mass or ductal dilatation. ADRENALS: Unremarkable. KIDNEYS/URETERS: No kidney stones are identified. There is no left hydronephrosis. There is severe right hydronephrosis, similar to the prior studies. There is also diffuse right hydroureter, improved since the prior study. STOMACH: Unremarkable. SMALL BOWEL: No dilatation or wall thickening. COLON: No dilatation or wall thickening. APPENDIX: Unremarkable. PERITONEUM: No ascites or pneumoperitoneum. RETROPERITONEUM: No lymphadenopathy or aortic aneurysm. REPRODUCTIVE ORGANS: Prostate is noted URINARY BLADDER: Diffuse bladder wall thickening. No bladder stones are identified BONES: No destructive bone lesion. ADDITIONAL COMMENTS: N/A     IMPRESSION: 1. Diffuse bladder wall thickening. However, the bladder is no longer distended. 2. Chronic severe right hydronephrosis and right hydroureter; findings are minimally improved. 3. Resolution of left hydronephrosis. 4. No genitourinary stones identified.

## 2017-07-04 NOTE — ED NOTES
Report given to MATHEW Walker. Patient reports pain is better, however continues to rate pain 10/10. Patient was sleeping soundly upon this RN entry into room. \"That pain medicine was weak\".

## 2017-07-04 NOTE — ED NOTES
Assumed care of patient from [facility]. Patient resting on stretcher, NAD noted at this time; Denies complaints. Bed low and locked, call bell in reach. Will continue to monitor.

## 2017-07-04 NOTE — ED TRIAGE NOTES
Patient arriving via EMS for c/o RIGHT ankle swelling. Denies trauma or injury. Reports \"when I left here it's been swollen ever since\". Patient hypertensive in triage.

## 2017-07-04 NOTE — ED NOTES
Patient bladder scanned for >921. Patient encouraged to void. Voided 950 mL of clear yellow urine. Post-void residual 11 mL. Gina Clarke MD notified.

## 2017-07-04 NOTE — ED PROVIDER NOTES
Patient is a 36 y.o. male presenting with ankle swelling. Ankle swelling    The problem has been gradually worsening. 36year old male with asthma, dm, htn, anxiety,  Diabetes insipidus, chronic bladder dysfunction usually refusing espana, presents with abdominal pain, vomiting and right ankle pain/swelling. States his symptoms have caused his anxiety to worsen. He states symptoms started today. Similar to symptoms for which he was admitted for a few weeks ago. States normal bm and urine. States compliance with htn meds. Pain is 10/10. Denies fall or injury to ankle. Denies fever. Denies history of clot. States severe pain left flank and abdomen. Denies alcohol or drug use. Recent admission for bilateral hydronephrosis, urinary retention, MAGGIE and HTN. Past Medical History:   Diagnosis Date    Asthma     Bronchitis    Diabetes ([facility] [address]     Hypertension     Psychiatric disorder     anxiety       History reviewed. No pertinent surgical history. History reviewed. No pertinent family history. Social History     Social History    Marital status: SINGLE     Spouse name: N/A    Number of children: N/A    Years of education: N/A     Occupational History    Not on file. Social History Main Topics    Smoking status: Current Some Day Smoker    Smokeless tobacco: Not on file    Alcohol use No    Drug use: No    Sexual activity: Not on file     Other Topics Concern    Not on file     Social History Narrative         ALLERGIES: Morphine    Review of Systems   Constitutional: Negative for fever. HENT: Negative for congestion. Eyes: Negative for visual disturbance. Respiratory: Positive for chest tightness and shortness of breath. Negative for cough. Cardiovascular: Positive for chest pain. Gastrointestinal: Positive for abdominal pain, nausea and vomiting. Endocrine: Negative for polyuria. Genitourinary: Negative for dysuria.    Musculoskeletal: Positive for gait problem and joint swelling. Skin: Negative for rash. Neurological: Negative for headaches. Psychiatric/Behavioral: The patient is nervous/anxious. Vitals:    07/04/17 0049 07/04/17 0050   BP: (!) 171/120 (!) 171/105   Pulse: 99    Resp: 18    Temp: 97.7 °F (36.5 °C)    SpO2: 98%    Weight: 83.9 kg (185 lb)    Height: 5' 9\" (1.753 m)             Physical Exam   Constitutional: He is oriented to person, place, and time. He appears well-developed and well-nourished. No distress. HENT:   Head: Normocephalic and atraumatic. Mouth/Throat: Oropharynx is clear and moist. No oropharyngeal exudate. Eyes: Conjunctivae and EOM are normal. Pupils are equal, round, and reactive to light. Right eye exhibits no discharge. Left eye exhibits no discharge. No scleral icterus. Neck: Normal range of motion. Neck supple. No JVD present. Cardiovascular: Normal rate, regular rhythm, normal heart sounds and intact distal pulses. Exam reveals no gallop and no friction rub. No murmur heard. Pulmonary/Chest: Effort normal and breath sounds normal. No stridor. No respiratory distress. He has no wheezes. He has no rales. He exhibits no tenderness. Abdominal: Soft. Bowel sounds are normal. He exhibits no distension and no mass. There is tenderness. There is no rebound and no guarding. Musculoskeletal: Normal range of motion. He exhibits edema (ankle and leg. no deformity. no erythema or open wounds. tender medial malleolus). He exhibits no tenderness. Neurological: He is alert and oriented to person, place, and time. He has normal reflexes. No cranial nerve deficit. He exhibits normal muscle tone. Coordination normal.   Skin: Skin is warm and dry. No rash noted. No erythema. Psychiatric: His behavior is normal. Judgment and thought content normal.   anxious        MDM  ED Course       Procedures    Endoscopy most recent admission- pancreatic rest in stomach. Lipase mildly elevated in 600's. Per GI- treatment the same.       Labs essentially at baseline. Mild elevation in lipase. H/o alcohol pancreatitis. Patient states he 'had a sip' of alcohol. CT scan looks improved from 2 weeks ago. Patient drinking fluids in ED without any evidence of pain or vomiting. BP coming down. BP elevated the entire hospitalization and essentially at baseline now. Patient agreeable with going home. Will do clear liquid diet for 48 hours. Will avoid alcohol. Will follow up with his pcp. WIll return if symptoms/condition worsens.

## 2017-09-26 ENCOUNTER — APPOINTMENT (OUTPATIENT)
Dept: CT IMAGING | Age: 32
End: 2017-09-26
Attending: EMERGENCY MEDICINE
Payer: MEDICAID

## 2017-09-26 ENCOUNTER — HOSPITAL ENCOUNTER (EMERGENCY)
Age: 32
Discharge: HOME | End: 2017-09-26
Attending: EMERGENCY MEDICINE
Payer: MEDICAID

## 2017-09-26 ENCOUNTER — APPOINTMENT (OUTPATIENT)
Dept: GENERAL RADIOLOGY | Age: 32
End: 2017-09-26
Attending: EMERGENCY MEDICINE
Payer: MEDICAID

## 2017-09-26 VITALS
RESPIRATION RATE: 18 BRPM | SYSTOLIC BLOOD PRESSURE: 163 MMHG | TEMPERATURE: 98.3 F | HEART RATE: 83 BPM | DIASTOLIC BLOOD PRESSURE: 112 MMHG | OXYGEN SATURATION: 100 %

## 2017-09-26 DIAGNOSIS — R10.11 RIGHT UPPER QUADRANT ABDOMINAL PAIN: ICD-10-CM

## 2017-09-26 DIAGNOSIS — R10.9 LEFT FLANK PAIN: Primary | ICD-10-CM

## 2017-09-26 LAB
ALBUMIN SERPL-MCNC: 4.4 G/DL (ref 3.5–5)
ALBUMIN/GLOB SERPL: 1 {RATIO} (ref 1.1–2.2)
ALP SERPL-CCNC: 110 U/L (ref 45–117)
ALT SERPL-CCNC: 25 U/L (ref 12–78)
ANION GAP SERPL CALC-SCNC: 6 MMOL/L (ref 5–15)
APPEARANCE UR: CLEAR
AST SERPL-CCNC: 11 U/L (ref 15–37)
ATRIAL RATE: 75 BPM
BACTERIA URNS QL MICRO: NEGATIVE /HPF
BASOPHILS # BLD: 0 K/UL (ref 0–0.1)
BASOPHILS NFR BLD: 0 % (ref 0–1)
BILIRUB SERPL-MCNC: 0.4 MG/DL (ref 0.2–1)
BILIRUB UR QL: NEGATIVE
BUN SERPL-MCNC: 11 MG/DL (ref 6–20)
BUN/CREAT SERPL: 9 (ref 12–20)
CALCIUM SERPL-MCNC: 10.2 MG/DL (ref 8.5–10.1)
CALCULATED P AXIS, ECG09: 62 DEGREES
CALCULATED R AXIS, ECG10: 51 DEGREES
CALCULATED T AXIS, ECG11: 36 DEGREES
CHLORIDE SERPL-SCNC: 115 MMOL/L (ref 97–108)
CK MB CFR SERPL CALC: 0.9 % (ref 0–2.5)
CK MB SERPL-MCNC: 1.2 NG/ML (ref 5–25)
CK SERPL-CCNC: 133 U/L (ref 39–308)
CO2 SERPL-SCNC: 27 MMOL/L (ref 21–32)
COLOR UR: NORMAL
CREAT SERPL-MCNC: 1.17 MG/DL (ref 0.7–1.3)
CRP SERPL-MCNC: 0.69 MG/DL (ref 0–0.6)
DIAGNOSIS, 93000: NORMAL
EOSINOPHIL # BLD: 0 K/UL (ref 0–0.4)
EOSINOPHIL NFR BLD: 0 % (ref 0–7)
EPITH CASTS URNS QL MICRO: NORMAL /LPF
ERYTHROCYTE [DISTWIDTH] IN BLOOD BY AUTOMATED COUNT: 15 % (ref 11.5–14.5)
ERYTHROCYTE [SEDIMENTATION RATE] IN BLOOD: 58 MM/HR (ref 0–15)
GLOBULIN SER CALC-MCNC: 4.2 G/DL (ref 2–4)
GLUCOSE SERPL-MCNC: 113 MG/DL (ref 65–100)
GLUCOSE UR STRIP.AUTO-MCNC: NEGATIVE MG/DL
HCT VFR BLD AUTO: 49.5 % (ref 36.6–50.3)
HGB BLD-MCNC: 17.2 G/DL (ref 12.1–17)
HGB UR QL STRIP: NEGATIVE
KETONES UR QL STRIP.AUTO: NEGATIVE MG/DL
LEUKOCYTE ESTERASE UR QL STRIP.AUTO: NEGATIVE
LIPASE SERPL-CCNC: 763 U/L (ref 73–393)
LYMPHOCYTES # BLD: 2 K/UL (ref 0.8–3.5)
LYMPHOCYTES NFR BLD: 15 % (ref 12–49)
MCH RBC QN AUTO: 31.9 PG (ref 26–34)
MCHC RBC AUTO-ENTMCNC: 34.7 G/DL (ref 30–36.5)
MCV RBC AUTO: 91.7 FL (ref 80–99)
MONOCYTES # BLD: 0.4 K/UL (ref 0–1)
MONOCYTES NFR BLD: 3 % (ref 5–13)
NEUTS SEG # BLD: 11 K/UL (ref 1.8–8)
NEUTS SEG NFR BLD: 82 % (ref 32–75)
NITRITE UR QL STRIP.AUTO: NEGATIVE
P-R INTERVAL, ECG05: 150 MS
PH UR STRIP: 6 [PH] (ref 5–8)
PLATELET # BLD AUTO: 305 K/UL (ref 150–400)
POTASSIUM SERPL-SCNC: 4 MMOL/L (ref 3.5–5.1)
PROT SERPL-MCNC: 8.6 G/DL (ref 6.4–8.2)
PROT UR STRIP-MCNC: NEGATIVE MG/DL
Q-T INTERVAL, ECG07: 396 MS
QRS DURATION, ECG06: 96 MS
QTC CALCULATION (BEZET), ECG08: 442 MS
RBC # BLD AUTO: 5.4 M/UL (ref 4.1–5.7)
RBC #/AREA URNS HPF: NORMAL /HPF (ref 0–5)
SODIUM SERPL-SCNC: 148 MMOL/L (ref 136–145)
SP GR UR REFRACTOMETRY: <1.005 (ref 1–1.03)
TROPONIN I SERPL-MCNC: <0.04 NG/ML
UA: UC IF INDICATED,UAUC: NORMAL
UROBILINOGEN UR QL STRIP.AUTO: 0.2 EU/DL (ref 0.2–1)
VENTRICULAR RATE, ECG03: 75 BPM
WBC # BLD AUTO: 13.4 K/UL (ref 4.1–11.1)
WBC URNS QL MICRO: NORMAL /HPF (ref 0–4)

## 2017-09-26 PROCEDURE — 96361 HYDRATE IV INFUSION ADD-ON: CPT

## 2017-09-26 PROCEDURE — 86140 C-REACTIVE PROTEIN: CPT | Performed by: EMERGENCY MEDICINE

## 2017-09-26 PROCEDURE — 85025 COMPLETE CBC W/AUTO DIFF WBC: CPT | Performed by: EMERGENCY MEDICINE

## 2017-09-26 PROCEDURE — 74011000250 HC RX REV CODE- 250: Performed by: EMERGENCY MEDICINE

## 2017-09-26 PROCEDURE — 96375 TX/PRO/DX INJ NEW DRUG ADDON: CPT

## 2017-09-26 PROCEDURE — 80053 COMPREHEN METABOLIC PANEL: CPT | Performed by: EMERGENCY MEDICINE

## 2017-09-26 PROCEDURE — 85652 RBC SED RATE AUTOMATED: CPT | Performed by: EMERGENCY MEDICINE

## 2017-09-26 PROCEDURE — 74011636320 HC RX REV CODE- 636/320: Performed by: EMERGENCY MEDICINE

## 2017-09-26 PROCEDURE — 96374 THER/PROPH/DIAG INJ IV PUSH: CPT

## 2017-09-26 PROCEDURE — 83690 ASSAY OF LIPASE: CPT | Performed by: EMERGENCY MEDICINE

## 2017-09-26 PROCEDURE — 74011250637 HC RX REV CODE- 250/637: Performed by: EMERGENCY MEDICINE

## 2017-09-26 PROCEDURE — 99284 EMERGENCY DEPT VISIT MOD MDM: CPT

## 2017-09-26 PROCEDURE — 84484 ASSAY OF TROPONIN QUANT: CPT | Performed by: EMERGENCY MEDICINE

## 2017-09-26 PROCEDURE — 36415 COLL VENOUS BLD VENIPUNCTURE: CPT | Performed by: EMERGENCY MEDICINE

## 2017-09-26 PROCEDURE — 82550 ASSAY OF CK (CPK): CPT | Performed by: EMERGENCY MEDICINE

## 2017-09-26 PROCEDURE — 93005 ELECTROCARDIOGRAM TRACING: CPT

## 2017-09-26 PROCEDURE — 74011000258 HC RX REV CODE- 258: Performed by: EMERGENCY MEDICINE

## 2017-09-26 PROCEDURE — 74011250636 HC RX REV CODE- 250/636: Performed by: EMERGENCY MEDICINE

## 2017-09-26 PROCEDURE — 74177 CT ABD & PELVIS W/CONTRAST: CPT

## 2017-09-26 PROCEDURE — 71020 XR CHEST PA LAT: CPT

## 2017-09-26 PROCEDURE — 81001 URINALYSIS AUTO W/SCOPE: CPT | Performed by: EMERGENCY MEDICINE

## 2017-09-26 RX ORDER — SODIUM CHLORIDE 9 MG/ML
150 INJECTION, SOLUTION INTRAVENOUS CONTINUOUS
Status: DISCONTINUED | OUTPATIENT
Start: 2017-09-26 | End: 2017-09-26 | Stop reason: HOSPADM

## 2017-09-26 RX ORDER — ONDANSETRON 2 MG/ML
4 INJECTION INTRAMUSCULAR; INTRAVENOUS
Status: COMPLETED | OUTPATIENT
Start: 2017-09-26 | End: 2017-09-26

## 2017-09-26 RX ORDER — KETOROLAC TROMETHAMINE 30 MG/ML
30 INJECTION, SOLUTION INTRAMUSCULAR; INTRAVENOUS
Status: COMPLETED | OUTPATIENT
Start: 2017-09-26 | End: 2017-09-26

## 2017-09-26 RX ORDER — OXYCODONE AND ACETAMINOPHEN 10; 325 MG/1; MG/1
1 TABLET ORAL
Qty: 10 TAB | Refills: 0 | Status: SHIPPED | OUTPATIENT
Start: 2017-09-26 | End: 2017-12-04

## 2017-09-26 RX ORDER — SODIUM CHLORIDE 0.9 % (FLUSH) 0.9 %
10 SYRINGE (ML) INJECTION
Status: COMPLETED | OUTPATIENT
Start: 2017-09-26 | End: 2017-09-26

## 2017-09-26 RX ORDER — SUCRALFATE 1 G/10ML
1 SUSPENSION ORAL 4 TIMES DAILY
Qty: 400 ML | Refills: 0 | Status: SHIPPED | OUTPATIENT
Start: 2017-09-26 | End: 2017-10-06

## 2017-09-26 RX ORDER — SUCRALFATE 1 G/10ML
1 SUSPENSION ORAL
Status: COMPLETED | OUTPATIENT
Start: 2017-09-26 | End: 2017-09-26

## 2017-09-26 RX ORDER — OXYCODONE AND ACETAMINOPHEN 10; 325 MG/1; MG/1
1 TABLET ORAL
Status: COMPLETED | OUTPATIENT
Start: 2017-09-26 | End: 2017-09-26

## 2017-09-26 RX ORDER — DICYCLOMINE HYDROCHLORIDE 20 MG/1
20 TABLET ORAL EVERY 6 HOURS
Qty: 20 TAB | Refills: 0 | Status: SHIPPED | OUTPATIENT
Start: 2017-09-26 | End: 2017-10-01

## 2017-09-26 RX ADMIN — SODIUM CHLORIDE 100 ML: 900 INJECTION, SOLUTION INTRAVENOUS at 14:41

## 2017-09-26 RX ADMIN — SODIUM CHLORIDE 150 ML/HR: 900 INJECTION, SOLUTION INTRAVENOUS at 12:57

## 2017-09-26 RX ADMIN — SUCRALFATE 1 G: 1 SUSPENSION ORAL at 15:08

## 2017-09-26 RX ADMIN — IOPAMIDOL 100 ML: 755 INJECTION, SOLUTION INTRAVENOUS at 14:41

## 2017-09-26 RX ADMIN — KETOROLAC TROMETHAMINE 30 MG: 30 INJECTION, SOLUTION INTRAMUSCULAR at 12:57

## 2017-09-26 RX ADMIN — ONDANSETRON 4 MG: 2 INJECTION INTRAMUSCULAR; INTRAVENOUS at 12:57

## 2017-09-26 RX ADMIN — Medication 10 ML: at 14:41

## 2017-09-26 RX ADMIN — OXYCODONE HYDROCHLORIDE AND ACETAMINOPHEN 1 TABLET: 10; 325 TABLET ORAL at 15:07

## 2017-09-26 RX ADMIN — LIDOCAINE HYDROCHLORIDE 40 ML: 20 SOLUTION ORAL; TOPICAL at 16:04

## 2017-09-26 RX ADMIN — FAMOTIDINE 20 MG: 10 INJECTION, SOLUTION INTRAVENOUS at 12:57

## 2017-09-26 NOTE — ED TRIAGE NOTES
Patient comes to the ER via EMS c/o generalized abd pain and R sided CP since yesterday. Patient was seen at [facility] and [facility] for same complaint and was discharged without diagnoses.

## 2017-09-26 NOTE — ED PROVIDER NOTES
Patient is a 36 y.o. male presenting with abdominal pain. Abdominal Pain    Associated symptoms include chest pain. Pertinent negatives include no diarrhea, no vomiting, no dysuria, no headaches and no back pain. Pt presents to the ED c/o chest pain and upper abdominal pain since yesterday. He has a history of HTN. DM, asthma and anxiety. Denies fever, cold symptoms,headache, neck pain, visual changes, focal weakness, unexplained weight changes or rash. Denies any difficulty breathing, difficulty swallowing, SOB or chest pain. Denies any nausea, vomiting or diarrhea. Pt. Reports that he has not had any food or medications today prior to arrival.  Old charts reviewed. Past Medical History:   Diagnosis Date    Asthma     Bronchitis    Diabetes ([facility] [address]     Hypertension     Psychiatric disorder     anxiety       History reviewed. No pertinent surgical history. History reviewed. No pertinent family history. Social History     Social History    Marital status: SINGLE     Spouse name: N/A    Number of children: N/A    Years of education: N/A     Occupational History    Not on file. Social History Main Topics    Smoking status: Current Some Day Smoker    Smokeless tobacco: Never Used    Alcohol use No    Drug use: No    Sexual activity: Not on file     Other Topics Concern    Not on file     Social History Narrative         ALLERGIES: Morphine    Review of Systems   Constitutional: Negative for activity change and appetite change. HENT: Negative for facial swelling, sore throat and trouble swallowing. Eyes: Negative. Respiratory: Negative for shortness of breath. Cardiovascular: Positive for chest pain. Gastrointestinal: Positive for abdominal pain. Negative for diarrhea and vomiting. Genitourinary: Negative for dysuria. Musculoskeletal: Negative for back pain and neck pain. Skin: Negative for color change. Neurological: Negative for headaches.    Psychiatric/Behavioral: Negative. There were no vitals filed for this visit. Physical Exam   Constitutional: He is oriented to person, place, and time. He appears well-nourished. Black male; smoker   HENT:   Head: Normocephalic. Right Ear: External ear normal.   Left Ear: External ear normal.   Mouth/Throat: Oropharynx is clear and moist.   Eyes: Pupils are equal, round, and reactive to light. Neck: Normal range of motion. Neck supple. Cardiovascular: Normal rate and regular rhythm. Pulmonary/Chest: Effort normal and breath sounds normal.   Abdominal: Soft. Bowel sounds are normal. He exhibits no distension and no mass. There is tenderness. There is no rebound and no guarding. Reports intermittent mid to right upper quadrant pain and left flank pain; Skin integrity is intact. There is no obvious deformity, bruising, rash, or erythema. Good neurovascular sensation. Musculoskeletal: Normal range of motion. Neurological: He is alert and oriented to person, place, and time. Skin: Skin is warm and dry. No rash noted. Nursing note and vitals reviewed. MDM  ED Course       Procedures    Pt has been re-examined an reports that he is hungry and wants to go home. Plan to give referrals for close gastroenterology follow up. Discussed plan of care with Dr. Kevin Burton. 3:45 PM  Patient's results and plan of care have been reviewed with him. Recommended also following up with the diabetes treatment center. Patient and/or family have verbally conveyed their understanding and agreement of the patient's signs, symptoms, diagnosis, treatment and prognosis and additionally agree to follow up as recommended or return to the Emergency Room should his condition change prior to follow-up.   Discharge instructions have also been provided to the patient with some educational information regarding his diagnosis as well a list of reasons why he would want to return to the ER prior to his follow-up appointment should his condition change. Adam Pichardo, NP

## 2017-09-26 NOTE — DISCHARGE INSTRUCTIONS
Abdominal Pain: Care Instructions  Your Care Instructions    Abdominal pain has many possible causes. Some aren't serious and get better on their own in a few days. Others need more testing and treatment. If your pain continues or gets worse, you need to be rechecked and may need more tests to find out what is wrong. You may need surgery to correct the problem. Don't ignore new symptoms, such as fever, nausea and vomiting, urination problems, pain that gets worse, and dizziness. These may be signs of a more serious problem. Your doctor may have recommended a follow-up visit in the next 8 to 12 hours. If you are not getting better, you may need more tests or treatment. The doctor has checked you carefully, but problems can develop later. If you notice any problems or new symptoms, get medical treatment right away. Follow-up care is a key part of your treatment and safety. Be sure to make and go to all appointments, and call your doctor if you are having problems. It's also a good idea to know your test results and keep a list of the medicines you take. How can you care for yourself at home? · Rest until you feel better. · To prevent dehydration, drink plenty of fluids, enough so that your urine is light yellow or clear like water. Choose water and other caffeine-free clear liquids until you feel better. If you have kidney, heart, or liver disease and have to limit fluids, talk with your doctor before you increase the amount of fluids you drink. · If your stomach is upset, eat mild foods, such as rice, dry toast or crackers, bananas, and applesauce. Try eating several small meals instead of two or three large ones. · Wait until 48 hours after all symptoms have gone away before you have spicy foods, alcohol, and drinks that contain caffeine. · Do not eat foods that are high in fat. · Avoid anti-inflammatory medicines such as aspirin, ibuprofen (Advil, Motrin), and naproxen (Aleve).  These can cause stomach upset. Talk to your doctor if you take daily aspirin for another health problem. When should you call for help? Call 911 anytime you think you may need emergency care. For example, call if:  · You passed out (lost consciousness). · You pass maroon or very bloody stools. · You vomit blood or what looks like coffee grounds. · You have new, severe belly pain. Call your doctor now or seek immediate medical care if:  · Your pain gets worse, especially if it becomes focused in one area of your belly. · You have a new or higher fever. · Your stools are black and look like tar, or they have streaks of blood. · You have unexpected vaginal bleeding. · You have symptoms of a urinary tract infection. These may include:  ¨ Pain when you urinate. ¨ Urinating more often than usual.  ¨ Blood in your urine. · You are dizzy or lightheaded, or you feel like you may faint. Watch closely for changes in your health, and be sure to contact your doctor if:  · You are not getting better after 1 day (24 hours). Where can you learn more? Go to http://[employer].info/. Enter H059 in the search box to learn more about \"Abdominal Pain: Care Instructions. \"  Current as of: March 20, 2017  Content Version: 11.3  © 7956-4033 [employer]. Care instructions adapted under license by [employer] (which disclaims liability or warranty for this information). If you have questions about a medical condition or this instruction, always ask your healthcare professional. [place] [address] any warranty or liability for your use of this information. Chest Pain (Angina): After Your Visit to the Emergency Room  Your Care Instructions  You have chest pain called angina because at times your heart does not get enough blood. This is caused by coronary artery disease (CAD). It causes major blood vessels to the heart to become narrow or blocked.  You did not have a heart attack, but CAD does increase your risk for a heart attack. Even though you have been released from the emergency room, you still need to watch for any problems. The doctor carefully checked you. But sometimes problems can develop later. A visit to the emergency room is only one step in your treatment. Even if you feel better, you still need to do what your doctor recommends, such as going to all suggested follow-up appointments and taking medicines exactly as directed. This will help you recover and help prevent future problems. How can you care for yourself at home? · If your doctor has given you nitroglycerin or other nitrate medicine, keep it with you at all times. If you have chest pain, sit down and rest, and take your nitroglycerin or other nitrates as directed. If your chest pain does not get better after 5 minutes, call 911. · Take your medicine exactly as prescribed. Call your doctor if you think you are having a problem with your medicine. When should you call for help? Call 911 if:  · You passed out (lost consciousness). · You have chest pain that does not go away with rest or is not getting better within 5 minutes after you take a dose of nitroglycerin. · You have symptoms of a heart attack. These may include:  ¨ Chest pain or pressure, or a strange feeling in the chest.  ¨ Sweating. ¨ Shortness of breath. ¨ Nausea or vomiting. ¨ Pain, pressure, or a strange feeling in the back, neck, jaw, or upper belly or in one or both shoulders or arms. ¨ Lightheadedness or sudden weakness. ¨ A fast or irregular pulse. After you call 911, the  may tell you to chew 1 adult-strength or 2 to 4 low-dose aspirin. Wait for an ambulance. Do not try to drive yourself. · You have other symptoms that you think are a medical emergency. Return to the emergency room now if:  · You are having chest pain more often than usual, even if it goes away when you rest or take nitroglycerin.   · You feel dizzy or lightheaded, or you feel like you may faint. Where can you learn more? Go to [employer]be  Enter P591 in the search box to learn more about \"Chest Pain (Angina): After Your Visit to the Emergency Room. \"   © 5453-1126 [employer]. Care instructions adapted under license by [facility] (which disclaims liability or warranty for this information). This care instruction is for use with your licensed healthcare professional. If you have questions about a medical condition or this instruction, always ask your healthcare professional. [address] any warranty or liability for your use of this information. Content Version: 9.4.33959;  Last Revised: February 23, 2012

## 2017-12-04 ENCOUNTER — APPOINTMENT (OUTPATIENT)
Dept: GENERAL RADIOLOGY | Age: 32
End: 2017-12-04
Attending: EMERGENCY MEDICINE
Payer: MEDICAID

## 2017-12-04 ENCOUNTER — HOSPITAL ENCOUNTER (OUTPATIENT)
Age: 32
Setting detail: OBSERVATION
Discharge: HOME | End: 2017-12-06
Attending: EMERGENCY MEDICINE | Admitting: FAMILY MEDICINE
Payer: MEDICAID

## 2017-12-04 ENCOUNTER — APPOINTMENT (OUTPATIENT)
Dept: CT IMAGING | Age: 32
End: 2017-12-04
Attending: FAMILY MEDICINE
Payer: MEDICAID

## 2017-12-04 DIAGNOSIS — E11.43 DIABETIC GASTROPARESIS (HCC): Primary | ICD-10-CM

## 2017-12-04 DIAGNOSIS — K31.84 DIABETIC GASTROPARESIS (HCC): Primary | ICD-10-CM

## 2017-12-04 DIAGNOSIS — R00.0 TACHYCARDIA: ICD-10-CM

## 2017-12-04 DIAGNOSIS — E86.0 DEHYDRATION: ICD-10-CM

## 2017-12-04 DIAGNOSIS — R03.0 ELEVATED BLOOD PRESSURE READING: ICD-10-CM

## 2017-12-04 PROBLEM — R11.2 NAUSEA & VOMITING: Status: ACTIVE | Noted: 2017-12-04

## 2017-12-04 LAB
ALBUMIN SERPL-MCNC: 5 G/DL (ref 3.5–5)
ALBUMIN/GLOB SERPL: 1.2 {RATIO} (ref 1.1–2.2)
ALP SERPL-CCNC: 90 U/L (ref 45–117)
ALT SERPL-CCNC: 31 U/L (ref 12–78)
ANION GAP SERPL CALC-SCNC: 7 MMOL/L (ref 5–15)
APPEARANCE UR: CLEAR
AST SERPL-CCNC: 15 U/L (ref 15–37)
BACTERIA URNS QL MICRO: NEGATIVE /HPF
BASOPHILS # BLD: 0 K/UL (ref 0–0.1)
BASOPHILS NFR BLD: 0 % (ref 0–1)
BILIRUB SERPL-MCNC: 0.5 MG/DL (ref 0.2–1)
BILIRUB UR QL: NEGATIVE
BUN SERPL-MCNC: 13 MG/DL (ref 6–20)
BUN/CREAT SERPL: 11 (ref 12–20)
CALCIUM SERPL-MCNC: 10.5 MG/DL (ref 8.5–10.1)
CHLORIDE SERPL-SCNC: 112 MMOL/L (ref 97–108)
CO2 SERPL-SCNC: 29 MMOL/L (ref 21–32)
COLOR UR: ABNORMAL
CREAT SERPL-MCNC: 1.19 MG/DL (ref 0.7–1.3)
EOSINOPHIL # BLD: 0 K/UL (ref 0–0.4)
EOSINOPHIL NFR BLD: 0 % (ref 0–7)
EPITH CASTS URNS QL MICRO: ABNORMAL /LPF
ERYTHROCYTE [DISTWIDTH] IN BLOOD BY AUTOMATED COUNT: 14.8 % (ref 11.5–14.5)
EST. AVERAGE GLUCOSE BLD GHB EST-MCNC: 108 MG/DL
GLOBULIN SER CALC-MCNC: 4.3 G/DL (ref 2–4)
GLUCOSE BLD STRIP.AUTO-MCNC: 110 MG/DL (ref 65–100)
GLUCOSE BLD STRIP.AUTO-MCNC: 147 MG/DL (ref 65–100)
GLUCOSE SERPL-MCNC: 119 MG/DL (ref 65–100)
GLUCOSE UR STRIP.AUTO-MCNC: NEGATIVE MG/DL
HBA1C MFR BLD: 5.4 % (ref 4.2–6.3)
HCT VFR BLD AUTO: 50.6 % (ref 36.6–50.3)
HGB BLD-MCNC: 17.5 G/DL (ref 12.1–17)
HGB UR QL STRIP: NEGATIVE
HYALINE CASTS URNS QL MICRO: ABNORMAL /LPF (ref 0–5)
KETONES UR QL STRIP.AUTO: NEGATIVE MG/DL
LEUKOCYTE ESTERASE UR QL STRIP.AUTO: NEGATIVE
LIPASE SERPL-CCNC: 367 U/L (ref 73–393)
LYMPHOCYTES # BLD: 1.1 K/UL (ref 0.8–3.5)
LYMPHOCYTES NFR BLD: 11 % (ref 12–49)
MAGNESIUM SERPL-MCNC: 2.6 MG/DL (ref 1.6–2.4)
MCH RBC QN AUTO: 32.3 PG (ref 26–34)
MCHC RBC AUTO-ENTMCNC: 34.6 G/DL (ref 30–36.5)
MCV RBC AUTO: 93.5 FL (ref 80–99)
MONOCYTES # BLD: 0.6 K/UL (ref 0–1)
MONOCYTES NFR BLD: 7 % (ref 5–13)
NEUTS SEG # BLD: 7.8 K/UL (ref 1.8–8)
NEUTS SEG NFR BLD: 82 % (ref 32–75)
NITRITE UR QL STRIP.AUTO: NEGATIVE
OSMOLALITY UR: 225 MOSM/KG H2O
PH UR STRIP: 7 [PH] (ref 5–8)
PHOSPHATE SERPL-MCNC: 2 MG/DL (ref 2.6–4.7)
PLATELET # BLD AUTO: 291 K/UL (ref 150–400)
POTASSIUM SERPL-SCNC: 4 MMOL/L (ref 3.5–5.1)
PROT SERPL-MCNC: 9.3 G/DL (ref 6.4–8.2)
PROT UR STRIP-MCNC: 30 MG/DL
RBC # BLD AUTO: 5.41 M/UL (ref 4.1–5.7)
RBC #/AREA URNS HPF: ABNORMAL /HPF (ref 0–5)
SERVICE CMNT-IMP: ABNORMAL
SERVICE CMNT-IMP: ABNORMAL
SODIUM SERPL-SCNC: 148 MMOL/L (ref 136–145)
SP GR UR REFRACTOMETRY: 1.01 (ref 1–1.03)
UR CULT HOLD, URHOLD: NORMAL
UROBILINOGEN UR QL STRIP.AUTO: 0.2 EU/DL (ref 0.2–1)
WBC # BLD AUTO: 9.5 K/UL (ref 4.1–11.1)
WBC URNS QL MICRO: ABNORMAL /HPF (ref 0–4)

## 2017-12-04 PROCEDURE — 74022 RADEX COMPL AQT ABD SERIES: CPT

## 2017-12-04 PROCEDURE — 96375 TX/PRO/DX INJ NEW DRUG ADDON: CPT

## 2017-12-04 PROCEDURE — 99218 HC RM OBSERVATION: CPT

## 2017-12-04 PROCEDURE — 81001 URINALYSIS AUTO W/SCOPE: CPT | Performed by: EMERGENCY MEDICINE

## 2017-12-04 PROCEDURE — 84100 ASSAY OF PHOSPHORUS: CPT | Performed by: FAMILY MEDICINE

## 2017-12-04 PROCEDURE — 83735 ASSAY OF MAGNESIUM: CPT | Performed by: FAMILY MEDICINE

## 2017-12-04 PROCEDURE — 83935 ASSAY OF URINE OSMOLALITY: CPT | Performed by: FAMILY MEDICINE

## 2017-12-04 PROCEDURE — 82962 GLUCOSE BLOOD TEST: CPT

## 2017-12-04 PROCEDURE — 85025 COMPLETE CBC W/AUTO DIFF WBC: CPT | Performed by: EMERGENCY MEDICINE

## 2017-12-04 PROCEDURE — 74011250636 HC RX REV CODE- 250/636: Performed by: FAMILY MEDICINE

## 2017-12-04 PROCEDURE — 96361 HYDRATE IV INFUSION ADD-ON: CPT

## 2017-12-04 PROCEDURE — 36415 COLL VENOUS BLD VENIPUNCTURE: CPT | Performed by: EMERGENCY MEDICINE

## 2017-12-04 PROCEDURE — 74176 CT ABD & PELVIS W/O CONTRAST: CPT

## 2017-12-04 PROCEDURE — 74011250636 HC RX REV CODE- 250/636: Performed by: EMERGENCY MEDICINE

## 2017-12-04 PROCEDURE — 96365 THER/PROPH/DIAG IV INF INIT: CPT

## 2017-12-04 PROCEDURE — 80053 COMPREHEN METABOLIC PANEL: CPT | Performed by: EMERGENCY MEDICINE

## 2017-12-04 PROCEDURE — 74011250637 HC RX REV CODE- 250/637: Performed by: FAMILY MEDICINE

## 2017-12-04 PROCEDURE — 96376 TX/PRO/DX INJ SAME DRUG ADON: CPT

## 2017-12-04 PROCEDURE — 74011000258 HC RX REV CODE- 258: Performed by: FAMILY MEDICINE

## 2017-12-04 PROCEDURE — 99284 EMERGENCY DEPT VISIT MOD MDM: CPT

## 2017-12-04 PROCEDURE — 83036 HEMOGLOBIN GLYCOSYLATED A1C: CPT | Performed by: FAMILY MEDICINE

## 2017-12-04 PROCEDURE — 83690 ASSAY OF LIPASE: CPT | Performed by: FAMILY MEDICINE

## 2017-12-04 PROCEDURE — 51798 US URINE CAPACITY MEASURE: CPT

## 2017-12-04 PROCEDURE — 96374 THER/PROPH/DIAG INJ IV PUSH: CPT

## 2017-12-04 RX ORDER — DEXTROSE MONOHYDRATE AND SODIUM CHLORIDE 5; .45 G/100ML; G/100ML
75 INJECTION, SOLUTION INTRAVENOUS CONTINUOUS
Status: DISCONTINUED | OUTPATIENT
Start: 2017-12-04 | End: 2017-12-04

## 2017-12-04 RX ORDER — HYDROMORPHONE HYDROCHLORIDE 2 MG/ML
0.5 INJECTION, SOLUTION INTRAMUSCULAR; INTRAVENOUS; SUBCUTANEOUS
Status: COMPLETED | OUTPATIENT
Start: 2017-12-04 | End: 2017-12-04

## 2017-12-04 RX ORDER — DESMOPRESSIN ACETATE 0.1 MG/1
100 TABLET ORAL EVERY EVENING
Status: DISCONTINUED | OUTPATIENT
Start: 2017-12-04 | End: 2017-12-06 | Stop reason: HOSPADM

## 2017-12-04 RX ORDER — ACETAMINOPHEN 325 MG/1
650 TABLET ORAL
Status: DISCONTINUED | OUTPATIENT
Start: 2017-12-04 | End: 2017-12-06 | Stop reason: HOSPADM

## 2017-12-04 RX ORDER — SODIUM CHLORIDE 450 MG/100ML
75 INJECTION, SOLUTION INTRAVENOUS CONTINUOUS
Status: DISCONTINUED | OUTPATIENT
Start: 2017-12-04 | End: 2017-12-04

## 2017-12-04 RX ORDER — ONDANSETRON 2 MG/ML
4 INJECTION INTRAMUSCULAR; INTRAVENOUS
Status: DISCONTINUED | OUTPATIENT
Start: 2017-12-04 | End: 2017-12-05

## 2017-12-04 RX ORDER — INSULIN LISPRO 100 [IU]/ML
INJECTION, SOLUTION INTRAVENOUS; SUBCUTANEOUS
Status: DISCONTINUED | OUTPATIENT
Start: 2017-12-04 | End: 2017-12-05

## 2017-12-04 RX ORDER — ALPRAZOLAM 0.5 MG/1
0.5 TABLET ORAL
Status: DISCONTINUED | OUTPATIENT
Start: 2017-12-04 | End: 2017-12-06 | Stop reason: HOSPADM

## 2017-12-04 RX ORDER — SODIUM CHLORIDE 0.9 % (FLUSH) 0.9 %
5-10 SYRINGE (ML) INJECTION EVERY 8 HOURS
Status: DISCONTINUED | OUTPATIENT
Start: 2017-12-04 | End: 2017-12-06 | Stop reason: HOSPADM

## 2017-12-04 RX ORDER — AMLODIPINE BESYLATE 5 MG/1
10 TABLET ORAL DAILY
Status: DISCONTINUED | OUTPATIENT
Start: 2017-12-05 | End: 2017-12-06 | Stop reason: HOSPADM

## 2017-12-04 RX ORDER — MAGNESIUM SULFATE 100 %
4 CRYSTALS MISCELLANEOUS AS NEEDED
Status: DISCONTINUED | OUTPATIENT
Start: 2017-12-04 | End: 2017-12-06 | Stop reason: HOSPADM

## 2017-12-04 RX ORDER — SODIUM CHLORIDE 450 MG/100ML
100 INJECTION, SOLUTION INTRAVENOUS ONCE
Status: COMPLETED | OUTPATIENT
Start: 2017-12-04 | End: 2017-12-04

## 2017-12-04 RX ORDER — SODIUM CHLORIDE 9 MG/ML
100 INJECTION, SOLUTION INTRAVENOUS CONTINUOUS
Status: CANCELLED | OUTPATIENT
Start: 2017-12-04

## 2017-12-04 RX ORDER — METOPROLOL SUCCINATE 50 MG/1
100 TABLET, EXTENDED RELEASE ORAL DAILY
Status: DISCONTINUED | OUTPATIENT
Start: 2017-12-05 | End: 2017-12-06 | Stop reason: HOSPADM

## 2017-12-04 RX ORDER — HYDROMORPHONE HYDROCHLORIDE 2 MG/ML
1 INJECTION, SOLUTION INTRAMUSCULAR; INTRAVENOUS; SUBCUTANEOUS
Status: DISCONTINUED | OUTPATIENT
Start: 2017-12-04 | End: 2017-12-05

## 2017-12-04 RX ORDER — DEXTROSE 50 % IN WATER (D50W) INTRAVENOUS SYRINGE
12.5-25 AS NEEDED
Status: DISCONTINUED | OUTPATIENT
Start: 2017-12-04 | End: 2017-12-06 | Stop reason: HOSPADM

## 2017-12-04 RX ORDER — HYDRALAZINE HYDROCHLORIDE 20 MG/ML
10 INJECTION INTRAMUSCULAR; INTRAVENOUS
Status: DISCONTINUED | OUTPATIENT
Start: 2017-12-04 | End: 2017-12-05

## 2017-12-04 RX ORDER — MORPHINE SULFATE 2 MG/ML
1 INJECTION, SOLUTION INTRAMUSCULAR; INTRAVENOUS
Status: DISCONTINUED | OUTPATIENT
Start: 2017-12-04 | End: 2017-12-04

## 2017-12-04 RX ORDER — SERTRALINE HYDROCHLORIDE 50 MG/1
50 TABLET, FILM COATED ORAL DAILY
Status: DISCONTINUED | OUTPATIENT
Start: 2017-12-05 | End: 2017-12-06 | Stop reason: HOSPADM

## 2017-12-04 RX ORDER — ONDANSETRON 2 MG/ML
4 INJECTION INTRAMUSCULAR; INTRAVENOUS
Status: DISCONTINUED | OUTPATIENT
Start: 2017-12-04 | End: 2017-12-04 | Stop reason: SDUPTHER

## 2017-12-04 RX ORDER — SODIUM CHLORIDE 0.9 % (FLUSH) 0.9 %
5-10 SYRINGE (ML) INJECTION AS NEEDED
Status: DISCONTINUED | OUTPATIENT
Start: 2017-12-04 | End: 2017-12-06 | Stop reason: HOSPADM

## 2017-12-04 RX ORDER — SERTRALINE HYDROCHLORIDE 50 MG/1
50 TABLET, FILM COATED ORAL DAILY
COMMUNITY

## 2017-12-04 RX ORDER — ALPRAZOLAM 0.5 MG/1
0.5 TABLET ORAL
COMMUNITY
End: 2021-05-13

## 2017-12-04 RX ORDER — INSULIN LISPRO 100 [IU]/ML
INJECTION, SOLUTION INTRAVENOUS; SUBCUTANEOUS EVERY 6 HOURS
Status: DISCONTINUED | OUTPATIENT
Start: 2017-12-04 | End: 2017-12-04

## 2017-12-04 RX ADMIN — ALPRAZOLAM 0.5 MG: 0.5 TABLET ORAL at 15:55

## 2017-12-04 RX ADMIN — PROMETHAZINE HYDROCHLORIDE 25 MG: 25 INJECTION INTRAMUSCULAR; INTRAVENOUS at 12:09

## 2017-12-04 RX ADMIN — Medication 10 ML: at 21:45

## 2017-12-04 RX ADMIN — SODIUM CHLORIDE 1000 ML: 900 INJECTION, SOLUTION INTRAVENOUS at 11:38

## 2017-12-04 RX ADMIN — HYDRALAZINE HYDROCHLORIDE 10 MG: 20 INJECTION INTRAMUSCULAR; INTRAVENOUS at 23:40

## 2017-12-04 RX ADMIN — MORPHINE SULFATE 1 MG: 2 INJECTION, SOLUTION INTRAMUSCULAR; INTRAVENOUS at 15:56

## 2017-12-04 RX ADMIN — ALPRAZOLAM 0.5 MG: 0.5 TABLET ORAL at 23:42

## 2017-12-04 RX ADMIN — DESMOPRESSIN ACETATE 100 MCG: 0.1 TABLET ORAL at 19:02

## 2017-12-04 RX ADMIN — Medication 10 ML: at 15:56

## 2017-12-04 RX ADMIN — HYDROMORPHONE HYDROCHLORIDE 0.5 MG: 2 INJECTION INTRAMUSCULAR; INTRAVENOUS; SUBCUTANEOUS at 13:09

## 2017-12-04 RX ADMIN — SODIUM CHLORIDE 100 ML/HR: 450 INJECTION, SOLUTION INTRAVENOUS at 16:28

## 2017-12-04 RX ADMIN — HYDROMORPHONE HYDROCHLORIDE 1 MG: 2 INJECTION INTRAMUSCULAR; INTRAVENOUS; SUBCUTANEOUS at 21:40

## 2017-12-04 NOTE — CONSULTS
Seen and examined  Thanks for the consult  A/p:chronic central DI,non compliabt with ddavp         Hypernatremia 2nd to the above          Hypercalcemia but high albumin           Chronic rt UPJ obstruction          CKD stage 2            abd pain  CTscan abd done  1/2 NS IVF  ddavp oral  Will follow

## 2017-12-04 NOTE — PROGRESS NOTES
Admission Medication Reconciliation:    Information obtained from: patient, rx query, patient's pharmacy      [id number] [address], [address]   [place] [address] [place] [address]   Phone: [phone]   Fax: [phone]    Significant PMH/Disease States:   Past Medical History:   Diagnosis Date    Asthma     Bronchitis    Diabetes ([address]     Gastroparesis     Hypertension     Psychiatric disorder     anxiety       Chief Complaint for this Admission:  abd pain    Allergies:  Morphine    Prior to Admission Medications:   Prior to Admission Medications   Prescriptions Last Dose Informant Patient Reported? Taking? ALPRAZolam (XANAX) 0.5 mg tablet   Yes Yes   Sig: Take 0.5 mg by mouth two (2) times daily as needed for Anxiety. albuterol (PROVENTIL HFA, VENTOLIN HFA, PROAIR HFA) 90 mcg/actuation inhaler   Yes No   Sig: Take 2 Puffs by inhalation every six (6) hours as needed for Wheezing or Shortness of Breath. amLODIPine (NORVASC) 10 mg tablet   Yes No   Sig: Take 10 mg by mouth daily. metoprolol succinate (TOPROL XL) 100 mg tablet   Yes No   Sig: Take 100 mg by mouth daily. sertraline (ZOLOFT) 50 mg tablet   Yes Yes   Sig: Take 50 mg by mouth daily. Facility-Administered Medications: None         Comments/Recommendations: Spoke with patient and called patient's pharmacy. Patient was dozing in and out during conversation and was unsure of the medication he takes. Stated that he does not take anything for pain or \"for his stomach. \"    Removed: ondansetron, percocet, nexium, norco, doxazosin 4 mg (last filled 8/22/16), desmopressin nasal spray 10 mcg/0.1ml 1 spray EN daily at bedtime (last filled for a 30 day supply 9/14/17)    Added: sertraline    Mei Barrow, PharmD  ED EXT [phone]

## 2017-12-04 NOTE — H&P
[address] [facility] [address]   HISTORY AND PHYSICAL       Name:  Eldon Meyers   MR#:  [id number]   :  1985   Account #:  [de-identified]        Date of Adm:  2017       CHIEF COMPLAINT: Nausea and vomiting. HISTORY OF PRESENT ILLNESS: The patient is a 65-year-old   gentleman with past medical history of hypertension, asthma, anxiety,   gastroparesis, hydronephrosis and diabetes insipidus, who presents to   the hospital with the above-mentioned symptoms. The patient reports   that he has been having abdominal pain associated with nausea and   vomiting that has been going on for about 3 days. Pain is exacerbated   by movement, and reports that it is dull and diffuse. He has not had a   bowel movement for the last 2 days. He is requesting me to get him   \"the strongest possible pain medication,\" because he could not bear   the pain. The patient also reports that he was sent to [facility]   for these complaints, was given a GI cocktail and discharged on   tramadol. The patient continued to have similar symptoms, could not   tolerate anything p.o., and decided to come to the hospital today. The   patient reports that he did take his medications this morning. The   patient denies any other complaints or problems. Denies any   headache, blurry vision, sore throat or trouble swallowing, trouble with   speech. Denies any chest pain, lightheadedness or dizziness. Denies   any diarrhea, fever, chills or urinary symptoms. Denies any focal or   generalized neurological weakness, rashes, any falls, injuries,   hematemesis, melena, hemoptysis or any other concerns or problems. The patient denies any other problems. The patient reports that he has   been unable to tolerate p.o. since this morning, although he took his   medications . PAST MEDICAL HISTORY: See above. HOME MEDICATIONS: Currently the patient is on   1.  50 mg daily. 2. Xanax 0.5 mg daily.    3. . 4. Norvasc 10 mg.   5. Metoprolol 100 mg. The patient reports that he has not been taking his DDAVP. SOCIAL HISTORY: The patient reports he smokes \"off and on,\" and   will probably smoke a pack in about 15-25 days. Denies alcohol use,   denies IV drug abuse. Lives at home. ALLERGIES: THE PATIENT REPORTS MORPHINE MAKES HIM   ANXIOUS, BUT IS ABLE TO TOLERATE THE SAME. REVIEW OF SYSTEMS: All systems were reviewed and they were   found to be essentially negative, except for the symptoms mentioned   above. PHYSICAL EXAMINATION   VITAL SIGNS: Temperature 98.8, pulse 69, respiratory rate 18, blood   pressure 130/101, pulse oximetry 95% on room air. GENERAL: Alert and oriented x3, awake, mildly distressed, pleasant   male, appears to be stated age. HEENT: Pupils equal and reactive to light. Dry mucous membranes. Tympanic membranes clear. NECK: Supple. CHEST: Clear to auscultation bilaterally. CORONARY: S1, S2 were heard. ABDOMEN: Soft, tender to palpation diffusely. No rebound. Mild   guarding. Bowel sounds were hypoactive. EXTREMITIES: No clubbing, no cyanosis, no edema. NEUROPSYCHIATRIC: Pleasant mood and affect. Cranial nerves II-  XII grossly intact. Sensory grossly within normal limits. DTRs 2+. Strength 5/5. SKIN: Warm. LABORATORY DATA: White count 9.5, hemoglobin 17.5, hematocrit   50.6, platelets 536. Urine shows no signs of infection. Sodium 148,   potassium 4, chloride 112, bicarbonate 29, anion gap 7, glucose 119,   BUN 13, creatinine 1.19, calcium 10.5, bilirubin total 0.5, albumin 5,   ALT 31, AST 15, alkaline phosphatase 90. DIAGNOSTIC DATA: CT of the abdomen and pelvis has been ordered. X-ray of the abdomen was done, which showed no acute   abnormalities. ASSESSMENT AND PLAN   1. Abdominal pain, unclear etiology. The patient does not have a CT of   the abdomen and pelvis. Neither did he have lipase drawn in the   emergency room, both of which have been ordered. Will provide   antiemetics, pain control and will continue to monitor. The patient has   history of diabetes insipidus, has been consulted by Nephrology, and   will discuss with them whether to start  or not. Will continue to   closely monitor. Will repeat the CT scan and may consider further   intervention and diagnostics based on that. The patient was given IV   fluids in the emergency room, and further intervention will be per   hospital course. Will reassess as needed. Continue to monitor. Ordered a bladder scan to make sure the patient does not have   obstructive uropathy. Monitor for now. The patient has history of   gastroparesis and gastritis, thus, proton pump inhibitor, as well as   antiemetics have been started. Further intervention will be per hospital   course. 2. History of hypertension, suboptimally controlled. Will start the patient   on hydralazine p.r.n., pain control and will continue to closely monitor. 3. History of diabetes insipidus. The patient is not sure whether he is   taking DDAVP or not, and reports that \"I cannot confirm that for sure. \"   Will seek help from Pharmacy to see whether the patient is taking this   medication or not. Regardless, we will talk to Nephrology, will restart   the patient on the same, and will continue to closely monitor. Will   reassess as needed. 4. Anxiety. Continue home medications and continue to monitor. 5. Gastrointestinal and deep venous thrombosis prophylaxis. The   patient will be on sequential compression devices.         Sable Lesches, MD MM / Randi Saldana   D:  12/04/2017   14:43   T:  12/04/2017   15:33   Job #:  [id number]

## 2017-12-04 NOTE — ED PROVIDER NOTES
HPI Comments: 36 y.o. male with past medical history significant for DM, HTN, asthma, anxiety, gastroparesis who presents from home with chief complaint of abdominal pain. Per EMS, pt has had \"sharp\" abdominal pain (current 10/10 severity) for the last 3 days with associated nausea and vomiting. Pain is exacerbated by movement and located throughout his abdomen but worst in \"the middle. \" He has had similar pain in the past with h/o gastroparesis. No diarrhea. Pt was [facility] at [facility] for these complaints, was given a GI cocktail and discharged on tramadol. Pt notes that most of his vomiting was yesterday and 2 days ago, reporting that he was able to keep down his morning medications today. He states that he has not eaten in the last 2 days. Pt reports that the sublingual antiemetics he has been given have made his nausea worse. There are no other acute medical concerns at this time. Social hx: Current someday smoker. No alcohol use. No illicit drug use. PCP: Cynthia Barkley MD    Note written by Pepe Combs, as dictated by Julian Joe MD 11:23 AM      The history is provided by the EMS personnel and the patient. No  was used. Past Medical History:   Diagnosis Date    Asthma     Bronchitis    Diabetes ([address]     Gastroparesis     Hypertension     Psychiatric disorder     anxiety       History reviewed. No pertinent surgical history. History reviewed. No pertinent family history. Social History     Social History    Marital status: SINGLE     Spouse name: N/A    Number of children: N/A    Years of education: N/A     Occupational History    Not on file.      Social History Main Topics    Smoking status: Current Some Day Smoker    Smokeless tobacco: Never Used    Alcohol use No    Drug use: No    Sexual activity: Not on file     Other Topics Concern    Not on file     Social History Narrative         ALLERGIES: Morphine    Review of Systems   Constitutional: Negative for appetite change, chills and fever. HENT: Negative for rhinorrhea, sore throat and trouble swallowing. Eyes: Negative for photophobia. Respiratory: Negative for cough and shortness of breath. Cardiovascular: Negative for chest pain and palpitations. Gastrointestinal: Positive for abdominal pain, nausea and vomiting. Negative for diarrhea. Genitourinary: Negative for dysuria, frequency and hematuria. Musculoskeletal: Negative for arthralgias. Neurological: Negative for dizziness, syncope and weakness. Psychiatric/Behavioral: Negative for behavioral problems. The patient is not nervous/anxious. All other systems reviewed and are negative. There were no vitals filed for this visit. Physical Exam   Constitutional: He appears well-developed and well-nourished. Appears uncomfortable. No distress. Appears chronically ill. HENT:   Head: Normocephalic and atraumatic. Mouth/Throat: Oropharynx is clear and moist.   Eyes: EOM are normal. Pupils are equal, round, and reactive to light. Neck: Normal range of motion. Neck supple. Cardiovascular: Normal rate, regular rhythm, normal heart sounds and intact distal pulses. Exam reveals no gallop and no friction rub. No murmur heard. Pulmonary/Chest: Effort normal. No respiratory distress. He has no wheezes. He has no rales. Abdominal: Soft. There is no rebound. Diffuse abdominal tenderness. Musculoskeletal: Normal range of motion. He exhibits no tenderness. Neurological: He is alert. No cranial nerve deficit. Motor; symmetric   Skin: No erythema. Psychiatric: He has a normal mood and affect. His behavior is normal.   Nursing note and vitals reviewed. Note written by Pepe Andersen, as dictated by Alon Duggan MD 11:30 AM      [facility]  ED Course       Procedures      Note: Patient's blood pressure is 175/105. He is resting a bit more comfortably after Phenergan.  He is still tachycardic. He has been unable to take in p.o. food and also liquid intake is decreased. Patient's sodium is high 148. Hemoglobin is more concentrated. This is the second ER visit for this problem in the last couple days. I feel like patient should be admitted for IV fluids and he may require IV blood pressure medications. Alverto Velasquez MD  12:48 PM    CONSULT NOTE:  1:20 PM Alverto Velasquez MD spoke with Dr. Miriam Lozano, Consult for Hospitalist.  Discussed available diagnostic tests and clinical findings. He is in agreement with care plans as outlined. Dr. Miriam Lozano will see and admit the pt.

## 2017-12-04 NOTE — IP AVS SNAPSHOT
[address] 
[phone] Patient: Eusebio Mansfield MRN: [id number] :1985 About your hospitalization You were admitted on:  2017 You last received care in the:  [facility] [place] [facility] OBSERVATION You were discharged on:  2017 Why you were hospitalized Your primary diagnosis was:  Nausea & Vomiting Things You Need To Do (next 8 weeks) Follow up with Kathleen Shipman MD  
  
Phone:  [phone] Where:  [address] [address] Follow up with Zenon Kyle MD in 1 week(s) Phone:  [phone] Where:  [facility] [address] [address] Discharge Orders None A check tina indicates which time of day the medication should be taken. My Medications TAKE these medications as instructed Instructions Each Dose to Equal  
 Morning Noon Evening Bedtime  
 albuterol 90 mcg/actuation inhaler Commonly known as:  PROVENTIL HFA, VENTOLIN HFA, PROAIR HFA Your last dose was: Your next dose is: Take 2 Puffs by inhalation every six (6) hours as needed for Wheezing or Shortness of Breath. 2 Puff ALPRAZolam 0.5 mg tablet Commonly known as:  Cecillia Zain Your last dose was: Your next dose is: Take 0.5 mg by mouth two (2) times daily as needed for Anxiety. 0.5 mg  
    
   
   
   
  
 amLODIPine 10 mg tablet Commonly known as:  Sherwin Presser Your last dose was: Your next dose is: Take 10 mg by mouth daily. 10 mg  
    
   
   
   
  
 desmopressin 0.1 mg tablet Commonly known as:  DDAVP Your last dose was: Your next dose is: Take 1 Tab by mouth every evening for 30 days. 0.1 mg  
    
   
   
   
  
 oxyCODONE-acetaminophen 5-325 mg per tablet Commonly known as:  PERCOCET Your last dose was: Your next dose is: Take 1 Tab by mouth every eight (8) hours as needed. Max Daily Amount: 3 Tabs. 1 Tab  
    
   
   
   
  
 sertraline 50 mg tablet Commonly known as:  ZOLOFT Your last dose was: Your next dose is: Take 50 mg by mouth daily. 50 mg  
    
   
   
   
  
 TOPROL  mg tablet Generic drug:  metoprolol succinate Your last dose was: Your next dose is: Take 100 mg by mouth daily. 100 mg Where to Get Your Medications Information on where to get these meds will be given to you by the nurse or doctor. ! Ask your nurse or doctor about these medications  
  desmopressin 0.1 mg tablet  
 oxyCODONE-acetaminophen 5-325 mg per tablet Discharge Instructions Discharge Instructions PATIENT ID: Mirza Guaman MRN: [id number] YOB: 1985 DATE OF ADMISSION: 12/4/2017 11:02 AM   
DATE OF DISCHARGE: 12/6/2017 PRIMARY CARE PROVIDER: Zoë Brooks MD  
 
ATTENDING PHYSICIAN: Leidy Kenny MD 
DISCHARGING PROVIDER: Leidy Kenny MD   
To contact this individual call [phone] and ask the  to page. If unavailable ask to be transferred the Adult Hospitalist Department. DISCHARGE DIAGNOSES Nausea and vomiting: resolved Hypernatremia: corrected. Central diabetes insipidus,chronic. The kidney doctor has recommended you take the DDAVP and follow with him in one week. I have added his contact below. HTN,accelerated due to the nausea and vomiting. Stabilized. Chronic R hydronephrosis and bladder wall thickening. You need follow up with a urologist as out patient,please discuss this with your primary doctor for referral. 
 
CONSULTATIONS: None PROCEDURES/SURGERIES: * No surgery found * PENDING TEST RESULTS:  
At the time of discharge the following test results are still pending: none FOLLOW UP APPOINTMENTS:  
 Follow-up Information Follow up With Details Comments Contact Info Anurag Engle MD   [address] [address] 
[phone] Vahe Duggan MD In 1 week  [facility] [address] [place] [address] 
[phone] ADDITIONAL CARE RECOMMENDATIONS:  
 
DIET: Cardiac Diet ACTIVITY: Activity as tolerated WOUND CARE: NA 
 
EQUIPMENT needed: NA 
 
 
  
 SNF/Inpatient Rehab/LTAC Independent/assisted living Hospice Other:  
 
 
 
 
Signed: Radha Mccurdy MD 
12/6/2017 
8:58 AM 
 
  
  
  
HCI Announcement We are excited to announce that we are making your provider's discharge notes available to you in HCI. You will see these notes when they are completed and signed by the physician that discharged you from your recent hospital stay. If you have any questions or concerns about any information you see in HCI, please call the Health Information Department where you were seen or reach out to your Primary Care Provider for more information about your plan of care. Introducing [facility]!    
 Kerri Reyez introduces HCI patient portal. Now you can access parts of your medical record, email your doctor's office, and request medication refills online. 1. In your internet browser, go to https://Send Word Now. [employer] Founders/Send Word Now 2. Click on the First Time User? Click Here link in the Sign In box. You will see the New Member Sign Up page. 3. Enter your [employer] Access Code exactly as it appears below. You will not need to use this code after youve completed the sign-up process. If you do not sign up before the expiration date, you must request a new code. · [facility] Access Code: [email or web address] Expires: 12/25/2017  2:51 PM 
 
4. Enter the last four digits of your Social Security Number (xxxx) and Date of Birth (mm/dd/yyyy) as indicated and click Submit. You will be taken to the next sign-up page. 5. Create a [facility] ID. This will be your [employer] login ID and cannot be changed, so think of one that is secure and easy to remember. 6. Create a [facility] password. You can change your password at any time. 7. Enter your Password Reset Question and Answer. This can be used at a later time if you forget your password. 8. Enter your e-mail address. You will receive e-mail notification when new information is available in [address] 9. Click Sign Up. You can now view and download portions of your medical record. 10. Click the Download Summary menu link to download a portable copy of your medical information. If you have questions, please visit the Frequently Asked Questions section of the [employer] website. Remember, [facility] is NOT to be used for urgent needs. For medical emergencies, dial 911. Now available from your iPhone and Android! Providers Seen During Your Hospitalization Provider Specialty Primary office phone Pinky Samaniego MD Emergency Medicine [phone] Ovi Rachel MD Hospitalist [phone]  Jose De Jesus Ovalles MD Internal Medicine [phone] Your Primary Care Physician (PCP) Primary Care Physician Office Phone Office Fax [facility] [phone] You are allergic to the following No active allergies Recent Documentation Height Weight BMI Smoking Status 1.753 m 88.5 kg 28.8 kg/m2 Current Some Day Smoker Emergency Contacts Name Discharge Info Relation Home Work Mobile [facility] DISCHARGE CAREGIVER [3] Girlfriend [18] [phone] Patient Belongings The following personal items are in your possession at time of discharge: 
     Visual Aid: None Please provide this summary of care documentation to your next provider. Signatures-by signing, you are acknowledging that this After Visit Summary has been reviewed with you and you have received a copy. Patient Signature:  ____________________________________________________________ Date:  ____________________________________________________________  
  
[facility] Signature:  ____________________________________________________________ Date:  ____________________________________________________________

## 2017-12-04 NOTE — ROUTINE PROCESS
TRANSFER - OUT REPORT:    Verbal report given to [facility] RN(name) on Charleen Has  being transferred to [facility](unit) for routine progression of care       Report consisted of patients Situation, Background, Assessment and   Recommendations(SBAR). Information from the following report(s) SBAR was reviewed with the receiving nurse. Lines:   Peripheral IV 12/04/17 Right Hand (Active)   Site Assessment Clean, dry, & intact 12/4/2017 11:08 AM   Phlebitis Assessment 0 12/4/2017 11:08 AM   Infiltration Assessment 0 12/4/2017 11:08 AM   Dressing Status Clean, dry, & intact 12/4/2017 11:08 AM   Dressing Type Transparent 12/4/2017 11:08 AM        Opportunity for questions and clarification was provided.       Patient transported with:

## 2017-12-04 NOTE — ED TRIAGE NOTES
Pt. arrives via EMS from home for complaint of abd pain, lower back pain since Saturday evening. Also complains of n/v.  Seen at [facility] yesterday for the same and discharged after GI cocktail with Tramadol.

## 2017-12-04 NOTE — CONSULTS
[address]       Name:  Zohra Fields   MR#:  [id number]   :  1985   Account #:  [de-identified]    Date of Consultation:  2017   Date of Adm:  2017       REASON FOR CONSULTATION: Seen for hypernatremia. HISTORY OF PRESENT ILLNESS: Thanks for the consult. The patient   said he was diagnosed having diabetes insipidus since he was 23  years old and there was no surgery done and he said it runs in his   family. He was supposed to take some intranasal desmopressin, but   did not take it for a few days, came here with mostly abdominal pain. Upon admission in the ER, he received a CAT scan of his abdomen   that is being read now. The note supposedly had a history of   hydronephrosis in the past. He said also he did not have time to   hydrate himself in the past and then previous CT scan was done in   July showed resolution of left hydronephrosis and chronic severe right   hydro and now CAT scan was done to be checked. It is not ready yet. Looking at creatinine, it is 1.19 on admission. It was between 1 to 1.1   recently. Sodium 148. MEDICATIONS AS OUTPATIENT: Desmopressin intranasal, not sure   what is the dose. MEDICATIONS AS INPATIENT   1. Half-normal 100 mL an hour. 2. Norvasc. 3. DDAVP 100 mcg oral at night. 4. Metoprolol. 5. Zoloft. SOCIAL HISTORY: Reviewed, current smoker. FAMILY HISTORY: Reviewed. PAST MEDICAL HISTORY: History of diabetes insipidus and anxiety,   abdominal pain, history of chronic right hydronephrosis. PHYSICAL EXAMINATION   GENERAL: Minimal distress, moaning in pain. VITAL SIGNS: Blood pressure 159/91, and saturating 96%. Heart rate   68. Urine output 600 mL since admission. NECK: JVD is negative. ABDOMEN: Diffusely tender. EXTREMITIES: No edema. LUNGS: Clear.     LABORATORY DATA: Sodium 148, potassium 4, BUN 15, creatinine   1.1, calcium is 10.5 with an albumin level of 5. Hemoglobin is 17.5,   platelets 479. UA is trace to +1 protein, 0-4 WBC. IMPRESSION   1. Chronic diabetes insipidus, noncompliant with his DDAVP   intranasally. 2. Hyponatremia related to diabetes insipidus and decreased fluid   intake. 3. History of chronic hydronephrosis on the right side. 4. Abdominal pain. 5. Hypercalcemia with albumin level   . RECOMMENDATIONS   1. Agree with half-normal saline for now, worry about the high blood   sugar. 2. Back on p.o. desmopressin pills. 3. Adjusted according to sodium level and urine output. 4. Need to find the etiology abdominal pain. 5. We will follow. ASHWINI Schroeder MD      WGA / DC   D:  12/04/2017   15:41   T:  12/04/2017   16:09   Job #:  [id number]

## 2017-12-04 NOTE — H&P
Spoke with Dr. Anne Marie Valentin (Nephrology)  At this time he recommends starting patient on 0.45% NaCl and he will asses the patient

## 2017-12-05 LAB
ALBUMIN SERPL-MCNC: 4.1 G/DL (ref 3.5–5)
ALBUMIN/GLOB SERPL: 1.1 {RATIO} (ref 1.1–2.2)
ALP SERPL-CCNC: 77 U/L (ref 45–117)
ALT SERPL-CCNC: 25 U/L (ref 12–78)
ANION GAP SERPL CALC-SCNC: 8 MMOL/L (ref 5–15)
AST SERPL-CCNC: 14 U/L (ref 15–37)
BASOPHILS # BLD: 0 K/UL (ref 0–0.1)
BASOPHILS NFR BLD: 0 % (ref 0–1)
BILIRUB SERPL-MCNC: 0.4 MG/DL (ref 0.2–1)
BUN SERPL-MCNC: 10 MG/DL (ref 6–20)
BUN/CREAT SERPL: 9 (ref 12–20)
CALCIUM SERPL-MCNC: 8.9 MG/DL (ref 8.5–10.1)
CHLORIDE SERPL-SCNC: 107 MMOL/L (ref 97–108)
CO2 SERPL-SCNC: 28 MMOL/L (ref 21–32)
CREAT SERPL-MCNC: 1.17 MG/DL (ref 0.7–1.3)
EOSINOPHIL # BLD: 0 K/UL (ref 0–0.4)
EOSINOPHIL NFR BLD: 0 % (ref 0–7)
ERYTHROCYTE [DISTWIDTH] IN BLOOD BY AUTOMATED COUNT: 15.3 % (ref 11.5–14.5)
GLOBULIN SER CALC-MCNC: 3.8 G/DL (ref 2–4)
GLUCOSE BLD STRIP.AUTO-MCNC: 91 MG/DL (ref 65–100)
GLUCOSE SERPL-MCNC: 118 MG/DL (ref 65–100)
HCT VFR BLD AUTO: 47.2 % (ref 36.6–50.3)
HGB BLD-MCNC: 15.7 G/DL (ref 12.1–17)
LYMPHOCYTES # BLD: 1.7 K/UL (ref 0.8–3.5)
LYMPHOCYTES NFR BLD: 22 % (ref 12–49)
MCH RBC QN AUTO: 31.8 PG (ref 26–34)
MCHC RBC AUTO-ENTMCNC: 33.3 G/DL (ref 30–36.5)
MCV RBC AUTO: 95.7 FL (ref 80–99)
MONOCYTES # BLD: 0.8 K/UL (ref 0–1)
MONOCYTES NFR BLD: 11 % (ref 5–13)
NEUTS SEG # BLD: 4.9 K/UL (ref 1.8–8)
NEUTS SEG NFR BLD: 67 % (ref 32–75)
PLATELET # BLD AUTO: 265 K/UL (ref 150–400)
POTASSIUM SERPL-SCNC: 3.6 MMOL/L (ref 3.5–5.1)
PROT SERPL-MCNC: 7.9 G/DL (ref 6.4–8.2)
RBC # BLD AUTO: 4.93 M/UL (ref 4.1–5.7)
SERVICE CMNT-IMP: NORMAL
SODIUM SERPL-SCNC: 143 MMOL/L (ref 136–145)
WBC # BLD AUTO: 7.5 K/UL (ref 4.1–11.1)

## 2017-12-05 PROCEDURE — 99218 HC RM OBSERVATION: CPT

## 2017-12-05 PROCEDURE — 80053 COMPREHEN METABOLIC PANEL: CPT | Performed by: FAMILY MEDICINE

## 2017-12-05 PROCEDURE — 36415 COLL VENOUS BLD VENIPUNCTURE: CPT | Performed by: FAMILY MEDICINE

## 2017-12-05 PROCEDURE — 74011250636 HC RX REV CODE- 250/636: Performed by: HOSPITALIST

## 2017-12-05 PROCEDURE — 96376 TX/PRO/DX INJ SAME DRUG ADON: CPT

## 2017-12-05 PROCEDURE — 85025 COMPLETE CBC W/AUTO DIFF WBC: CPT | Performed by: FAMILY MEDICINE

## 2017-12-05 PROCEDURE — 74011250637 HC RX REV CODE- 250/637: Performed by: HOSPITALIST

## 2017-12-05 PROCEDURE — 82962 GLUCOSE BLOOD TEST: CPT

## 2017-12-05 PROCEDURE — 74011250636 HC RX REV CODE- 250/636: Performed by: FAMILY MEDICINE

## 2017-12-05 PROCEDURE — 74011250637 HC RX REV CODE- 250/637: Performed by: FAMILY MEDICINE

## 2017-12-05 RX ORDER — ONDANSETRON 4 MG/1
4 TABLET, ORALLY DISINTEGRATING ORAL
Status: DISCONTINUED | OUTPATIENT
Start: 2017-12-05 | End: 2017-12-06 | Stop reason: HOSPADM

## 2017-12-05 RX ORDER — OXYCODONE AND ACETAMINOPHEN 5; 325 MG/1; MG/1
1 TABLET ORAL
Status: DISCONTINUED | OUTPATIENT
Start: 2017-12-05 | End: 2017-12-06 | Stop reason: HOSPADM

## 2017-12-05 RX ORDER — HYDROMORPHONE HYDROCHLORIDE 2 MG/ML
1 INJECTION, SOLUTION INTRAMUSCULAR; INTRAVENOUS; SUBCUTANEOUS
Status: DISCONTINUED | OUTPATIENT
Start: 2017-12-05 | End: 2017-12-06 | Stop reason: HOSPADM

## 2017-12-05 RX ADMIN — Medication 10 ML: at 06:33

## 2017-12-05 RX ADMIN — ONDANSETRON 4 MG: 4 TABLET, ORALLY DISINTEGRATING ORAL at 18:50

## 2017-12-05 RX ADMIN — SERTRALINE 50 MG: 50 TABLET, FILM COATED ORAL at 08:26

## 2017-12-05 RX ADMIN — DESMOPRESSIN ACETATE 100 MCG: 0.1 TABLET ORAL at 18:47

## 2017-12-05 RX ADMIN — OXYCODONE HYDROCHLORIDE AND ACETAMINOPHEN 1 TABLET: 5; 325 TABLET ORAL at 18:47

## 2017-12-05 RX ADMIN — ACETAMINOPHEN 650 MG: 325 TABLET, FILM COATED ORAL at 06:35

## 2017-12-05 RX ADMIN — OXYCODONE HYDROCHLORIDE AND ACETAMINOPHEN 1 TABLET: 5; 325 TABLET ORAL at 10:06

## 2017-12-05 RX ADMIN — Medication 10 ML: at 22:05

## 2017-12-05 RX ADMIN — HYDROMORPHONE HYDROCHLORIDE 1 MG: 2 INJECTION INTRAMUSCULAR; INTRAVENOUS; SUBCUTANEOUS at 02:38

## 2017-12-05 RX ADMIN — METOPROLOL SUCCINATE 100 MG: 50 TABLET, EXTENDED RELEASE ORAL at 08:26

## 2017-12-05 RX ADMIN — AMLODIPINE BESYLATE 10 MG: 5 TABLET ORAL at 08:26

## 2017-12-05 RX ADMIN — Medication 10 ML: at 14:00

## 2017-12-05 RX ADMIN — HYDROMORPHONE HYDROCHLORIDE 1 MG: 2 INJECTION INTRAMUSCULAR; INTRAVENOUS; SUBCUTANEOUS at 22:04

## 2017-12-05 RX ADMIN — Medication 10 ML: at 02:39

## 2017-12-05 RX ADMIN — ALPRAZOLAM 0.5 MG: 0.5 TABLET ORAL at 22:04

## 2017-12-05 NOTE — PROGRESS NOTES
Reviewed medical chart; met with the patient at the bedside. Patient is being seen for nausea/vomiting. Patient lives with his \"babies momma\" and his two children - ages 5 and 8. Patient has a PCP - Dr. Vikas Kramer who he last saw 2-3 weeks ago. Patient gets his prescriptions at [facility] on [address]. He is not employed, but states that he receives disability income. The patient was provided with a copy of his Observation Notice. Patient was given opportunity for questions and clarification. The patient signed a copy of the document that was then placed on the hard chart. Care Management will continue to follow his disposition. TOMMY Odell    Care Management Interventions  PCP Verified by CM:  Yes  Palliative Care Criteria Met (RRAT>21 & CHF Dx)?: No  Mode of Transport at Discharge:  (Patient will travel via car at discharge.  )  MyChart Signup: No  Discharge Durable Medical Equipment: No  Physical Therapy Consult: No  Occupational Therapy Consult: No  Speech Therapy Consult: No  Current Support Network: Own Home (Patient lives with his \"babies momma\" and their two children ages 5 and 8.  )  Confirm Follow Up Transport:  (Patient will travel via car at discharge.  )  Plan discussed with Pt/Family/Caregiver: Yes  Freedom of Choice Offered: Yes  Discharge Location  Discharge Placement: Home

## 2017-12-05 NOTE — PROGRESS NOTES
Hospitalist Progress Note                               Angelique Parekh MD                                     Answering service: [phone]                               OR [phone] from in house phone                               Cell: [phone]              Date of Service:  2017  NAME:  Pedro Slater  :  1985  MRN:  [id number]      Admission Summary: This is a 36year old gentleman whop came to the ER for abdominal pain, nausea and vomiting. Past Medical History:   Diagnosis Date    Asthma     Bronchitis    Diabetes ([address]     Gastroparesis     Hypertension     Psychiatric disorder     anxiety         Interval history / Subjective:     Still has epigastric pain,but ha tolerated diet, no nausea or vomiting. Assessment & Plan:   Abdominal pain ,nausea and vomiting.  -Gastroparesis mentioned in the past medical hx.  -CT abdomen and pelvis w/o contrast showed chronic R hydronephrosis and bladder wall thickening which were known form previous admission. NO other acute findings. KUB negative.  -Pain better,tolerating diet  -Can't do NM gastric emptying as he  has taken doses of dilaudid  -Advance diet.  -Had EGD in  that showed erosive gastritis in the stomach,path negative    Central DI with hypernatremia  -Non compliance with DDAVP  -Nephrology consulted. On DDAVP. -NA improved. Chronic R hydronephrosis and bladder wall thickening  -NO evidence of UTI  -Creatinine stable ~1.1  -Recommend out patient follow up     Accelerated HTN due to stress of  pain, nausea and vomiting  -BP improved  -Continue home regimen. Anxiety  -on zoloft and xanax.               Diet:regular  Code status: full  DVT prophylaxis: ambulatory,scd  Care Plan discussed with: patient,RN    Discharge planning/disposition:home in 1-2 days    Hospital Problems  Date Reviewed: 2017          Codes Class Noted POA    * (Principal)Nausea & vomiting ICD-10-CM: R11.2  ICD-9-CM: 787.01  12/4/2017 Unknown                Review of Systems:   A comprehensive review of systems was negative except for that written in the HPI. Physical Examination:      Last 24hrs VS reviewed since prior progress note. Most recent are:  Visit Vitals    /85 (BP 1 Location: Left arm, BP Patient Position: Supine)    Pulse 77    Temp 98 °F (36.7 °C)    Resp 18    Ht 5' 9\" (1.753 m)    Wt 88.5 kg (195 lb)    SpO2 99%    BMI 28.8 kg/m2           Constitutional:  No acute distress, cooperative, pleasant    Eyes: Non icteric,non pallor,no erythema,discharge,PERRLA   ENT:  Oral mucous moist, oropharynx benign. Neck supple,    Resp:  CTA bilaterally. No wheezing/rhonchi/rales. No accessory muscle use   CV:  Regular rhythm, normal rate, no murmurs, gallops, rubs    GI:  Soft, non distended, non tender. normoactive bowel sounds, no hepatosplenomegaly    :  No CVA or suprapubic tenderness   Skin  :  No erythema,rash,bullae,dipigmentation     Musculoskeletal:  No edema, warm, 2+ pulses throughout    Neurologic:  AAOx3, CN II-XII reviewed. Moves all extremities. Psych:  Good insight, Not anxious nor agitated.        Intake/Output Summary (Last 24 hours) at 12/05/17 0903  Last data filed at 12/05/17 0800   Gross per 24 hour   Intake              240 ml   Output              625 ml   Net             -385 ml          Data Review:    Review and/or order of clinical lab test  Review and/or order of tests in the radiology section of CPT  Review and/or order of tests in the medicine section of CPT      Labs:     Recent Labs      12/05/17   0253  12/04/17   1109   WBC  7.5  9.5   HGB  15.7  17.5*   HCT  47.2  50.6*   PLT  265  291     Recent Labs      12/05/17   0253  12/04/17   1109   NA  143  148*   K  3.6  4.0   CL  107  112*   CO2  28  29   BUN  10  13   CREA  1.17  1.19   GLU  118*  119*   CA  8.9  10.5*   MG   --   2.6*   PHOS   --   2.0*     Recent Labs      12/05/17   0253  12/04/17   1109 SGOT  14*  15   ALT  25  31   AP  77  90   TBILI  0.4  0.5   TP  7.9  9.3*   ALB  4.1  5.0   GLOB  3.8  4.3*   LPSE   --   367     No results for input(s): INR, PTP, APTT in the last 72 hours. No lab exists for component: INREXT   No results for input(s): FE, TIBC, PSAT, FERR in the last 72 hours. No results found for: FOL, RBCF   No results for input(s): PH, PCO2, PO2 in the last 72 hours. No results for input(s): CPK, CKNDX, TROIQ in the last 72 hours.     No lab exists for component: CPKMB  No results found for: CHOL, CHOLX, CHLST, CHOLV, HDL, LDL, LDLC, DLDLP, TGLX, TRIGL, TRIGP, CHHD, CHHDX  Lab Results   Component Value Date/Time    Glucose (POC) 91 12/05/2017 06:31 AM    Glucose (POC) 147 12/04/2017 09:43 PM    Glucose (POC) 110 12/04/2017 04:00 PM    Glucose (POC) 119 06/20/2017 09:40 PM     Lab Results   Component Value Date/Time    Color YELLOW/STRAW 12/04/2017 11:31 AM    Appearance CLEAR 12/04/2017 11:31 AM    Specific gravity 1.008 12/04/2017 11:31 AM    pH (UA) 7.0 12/04/2017 11:31 AM    Protein 30 12/04/2017 11:31 AM    Glucose NEGATIVE  12/04/2017 11:31 AM    Ketone NEGATIVE  12/04/2017 11:31 AM    Bilirubin NEGATIVE  12/04/2017 11:31 AM    Urobilinogen 0.2 12/04/2017 11:31 AM    Nitrites NEGATIVE  12/04/2017 11:31 AM    Leukocyte Esterase NEGATIVE  12/04/2017 11:31 AM    Epithelial cells FEW 12/04/2017 11:31 AM    Bacteria NEGATIVE  12/04/2017 11:31 AM    WBC 0-4 12/04/2017 11:31 AM    RBC 0-5 12/04/2017 11:31 AM         Medications Reviewed:     Current Facility-Administered Medications   Medication Dose Route Frequency    ALPRAZolam (XANAX) tablet 0.5 mg  0.5 mg Oral BID PRN    amLODIPine (NORVASC) tablet 10 mg  10 mg Oral DAILY    metoprolol succinate (TOPROL-XL) XL tablet 100 mg  100 mg Oral DAILY    sertraline (ZOLOFT) tablet 50 mg  50 mg Oral DAILY    sodium chloride (NS) flush 5-10 mL  5-10 mL IntraVENous Q8H    sodium chloride (NS) flush 5-10 mL  5-10 mL IntraVENous PRN    acetaminophen (TYLENOL) tablet 650 mg  650 mg Oral Q4H PRN    ondansetron (ZOFRAN) injection 4 mg  4 mg IntraVENous Q4H PRN    desmopressin (DDAVP) tablet 100 mcg  100 mcg Oral QPM    glucose chewable tablet 16 g  4 Tab Oral PRN    dextrose (D50W) injection syrg 12.5-25 g  12.5-25 g IntraVENous PRN    glucagon (GLUCAGEN) injection 1 mg  1 mg IntraMUSCular PRN    hydrALAZINE (APRESOLINE) 20 mg/mL injection 10 mg  10 mg IntraVENous Q6H PRN    insulin lispro (HUMALOG) injection   SubCUTAneous AC&HS    HYDROmorphone (DILAUDID) injection 1 mg  1 mg IntraVENous Q4H PRN     ______________________________________________________________________  EXPECTED LENGTH OF STAY: - - -  ACTUAL LENGTH OF STAY:          0                 Alma Newby MD

## 2017-12-05 NOTE — PROGRESS NOTES
RENAL  PROGRESS NOTE        Subjective:    COMPLAINT: on/off abd pain          Objective:   VITALS SIGNS:    Visit Vitals    /85 (BP 1 Location: Left arm, BP Patient Position: Supine)    Pulse 77    Temp 98 °F (36.7 °C)    Resp 18    Ht 5' 9\" (1.753 m)    Wt 88.5 kg (195 lb)    SpO2 99%    BMI 28.8 kg/m2       O2 Device: Room air       Temp (24hrs), Av.2 °F (36.8 °C), Min:97.5 °F (36.4 °C), Max:98.8 °F (37.1 °C)         PHYSICAL EXAM:    No edema  DATA REVIEW:     INTAKE / OUTPUT:   Last shift:      701 - 1900  In: 240 [P.O.:240]  Out: -   Last 3 shifts: 1901 - 700  In: -   Out: 625 [Urine:625]    Intake/Output Summary (Last 24 hours) at 17 0856  Last data filed at 17 0800   Gross per 24 hour   Intake              240 ml   Output              625 ml   Net             -385 ml         LABS:   Recent Labs      17   0253  17   1109   WBC  7.5  9.5   HGB  15.7  17.5*   HCT  47.2  50.6*   PLT  265  291     Recent Labs      17   0253  17   1109   NA  143  148*   K  3.6  4.0   CL  107  112*   CO2  28  29   GLU  118*  119*   BUN  10  13   CREA  1.17  1.19   CA  8.9  10.5*   MG   --   2.6*   PHOS   --   2.0*   ALB  4.1  5.0   TBILI  0.4  0.5   SGOT  14*  15   ALT  25  31           Assessment:     chronic central DI,non compliabt with ddavp    Hypernatremia 2nd to the above    Hypercalcemia but high albumin     Chronic rt UPJ obstruction    CKD stage 2     abd pain      Plan:   Sodium better  oral ddavp  Will follow    Aida Head MD

## 2017-12-06 VITALS
HEIGHT: 69 IN | SYSTOLIC BLOOD PRESSURE: 156 MMHG | BODY MASS INDEX: 28.88 KG/M2 | RESPIRATION RATE: 16 BRPM | OXYGEN SATURATION: 95 % | HEART RATE: 64 BPM | DIASTOLIC BLOOD PRESSURE: 95 MMHG | WEIGHT: 195 LBS | TEMPERATURE: 97.7 F

## 2017-12-06 PROBLEM — R11.2 NAUSEA & VOMITING: Status: RESOLVED | Noted: 2017-12-04 | Resolved: 2017-12-06

## 2017-12-06 PROCEDURE — 74011250636 HC RX REV CODE- 250/636: Performed by: HOSPITALIST

## 2017-12-06 PROCEDURE — 99218 HC RM OBSERVATION: CPT

## 2017-12-06 PROCEDURE — 74011250637 HC RX REV CODE- 250/637: Performed by: FAMILY MEDICINE

## 2017-12-06 PROCEDURE — 96376 TX/PRO/DX INJ SAME DRUG ADON: CPT

## 2017-12-06 RX ORDER — OXYCODONE AND ACETAMINOPHEN 5; 325 MG/1; MG/1
1 TABLET ORAL
Qty: 14 TAB | Refills: 0 | Status: SHIPPED | OUTPATIENT
Start: 2017-12-06 | End: 2018-06-29

## 2017-12-06 RX ORDER — DESMOPRESSIN ACETATE 0.1 MG/1
0.1 TABLET ORAL EVERY EVENING
Qty: 30 TAB | Refills: 0 | Status: SHIPPED | OUTPATIENT
Start: 2017-12-06 | End: 2018-01-05

## 2017-12-06 RX ADMIN — AMLODIPINE BESYLATE 10 MG: 5 TABLET ORAL at 08:33

## 2017-12-06 RX ADMIN — HYDROMORPHONE HYDROCHLORIDE 1 MG: 2 INJECTION INTRAMUSCULAR; INTRAVENOUS; SUBCUTANEOUS at 05:59

## 2017-12-06 RX ADMIN — ALPRAZOLAM 0.5 MG: 0.5 TABLET ORAL at 05:59

## 2017-12-06 RX ADMIN — METOPROLOL SUCCINATE 100 MG: 50 TABLET, EXTENDED RELEASE ORAL at 08:32

## 2017-12-06 RX ADMIN — SERTRALINE 50 MG: 50 TABLET, FILM COATED ORAL at 08:32

## 2017-12-06 NOTE — PROGRESS NOTES
0900 Paged Dr. Nicholas Vaughan patient's BP in left arm 192/120 and in the right arm 200/116. Calling to get medication to treat all BP meds were given this morning none PRN or for night time. Dr Nicholas Vaughan called back and said give him dilaudid and recheck BP in 30 minutes. 2254 patient's /78 and his pain is 6/10 given dilaudid and Xanax. 0000 patient is resting in bed     0515 VSS, BP is elevated and pain is 5/10    0559 patient's pain is 8/10 pain med and Xanax given to treat pain and BP.

## 2017-12-06 NOTE — DISCHARGE INSTRUCTIONS
Discharge Instructions       PATIENT ID: Carol Garcia  MRN: [id number]   YOB: 1985    DATE OF ADMISSION: 12/4/2017 11:02 AM    DATE OF DISCHARGE: 12/6/2017    PRIMARY CARE PROVIDER: Lang Olmstead MD     ATTENDING PHYSICIAN: Floyd Gay MD  DISCHARGING PROVIDER: Floyd Gay MD    To contact this individual call [phone] and ask the  to page. If unavailable ask to be transferred the Adult Hospitalist Department. DISCHARGE DIAGNOSES   Nausea and vomiting: resolved  Hypernatremia: corrected. Central diabetes insipidus,chronic. The kidney doctor has recommended you take the DDAVP and follow with him in one week. I have added his contact below. HTN,accelerated due to the nausea and vomiting. Stabilized. Chronic R hydronephrosis and bladder wall thickening. You need follow up with a urologist as out patient,please discuss this with your primary doctor for referral.    [address]: None    PROCEDURES/SURGERIES: * No surgery found *    PENDING TEST RESULTS:   At the time of discharge the following test results are still pending: none    FOLLOW UP APPOINTMENTS:   Follow-up Information     Follow up With Details Comments Ginette Mcdonald MD   [address] [place] [address] Herman Freedman MD In 1 week  [address] [place], [place] [id number] [phone]             ADDITIONAL CARE RECOMMENDATIONS:     DIET: Cardiac Diet    ACTIVITY: Activity as tolerated    WOUND CARE: NA    EQUIPMENT needed: NA      DISCHARGE MEDICATIONS:   See Medication Reconciliation Form    · It is important that you take the medication exactly as they are prescribed. · Keep your medication in the bottles provided by the pharmacist and keep a list of the medication names, dosages, and times to be taken in your wallet. · Do not take other medications without consulting your doctor.        NOTIFY YOUR PHYSICIAN FOR ANY OF THE FOLLOWING:   Fever over 101 degrees for 24 hours. Chest pain, shortness of breath, fever, chills, nausea, vomiting, diarrhea, change in mentation, falling, weakness, bleeding. Severe pain or pain not relieved by medications. Or, any other signs or symptoms that you may have questions about. DISPOSITION:   x Home With:   OT  PT  HH  RN       SNF/Inpatient Rehab/LTAC    Independent/assisted living    Hospice    Other:           Signed:    Marletta Cheadle, MD  12/6/2017  8:58 AM

## 2017-12-06 NOTE — DISCHARGE SUMMARY
Discharge Summary       PATIENT ID: Arnol Carbone  MRN: [id number]   YOB: 1985    DATE OF ADMISSION: 12/4/2017 11:02 AM    DATE OF DISCHARGE: 12/6/2017  PRIMARY CARE PROVIDER: Maria Esther Guy MD     ATTENDING PHYSICIAN: Lana Royal MD  DISCHARGING PROVIDER: Lana Royal MD    To contact this individual call [phone] and ask the  to page. If unavailable ask to be transferred the Adult Hospitalist Department. CONSULTATIONS: None    PROCEDURES/SURGERIES: * No surgery found *    ADMITTING DIAGNOSES & HOSPITAL COURSE:   Abdominal pain ,nausea and vomiting. Resolved. Tolerated regular diet since yesterday.  -Gastroparesis mentioned in the past medical hx.  -CT abdomen and pelvis w/o contrast showed chronic R hydronephrosis and bladder wall thickening which were known form previous admission. NO other acute findings. KUB negative.  -Can't do NM gastric emptying as he  has taken doses of dilaudid  -Had EGD in June 2017 that showed erosive gastritis in the stomach,path negative  -No symptoms since yesterday,tolerated regular diet. Discharged in stable condition. Central DI with hypernatremia  -Non compliance with DDAVP  -Nephrology consulted. On DDAVP. Na corrected. Discussed with Dr Anne Marie Valentin on day of discharge,he recommended to contionue the oral DDAVP and see him in the office in one week       Chronic R hydronephrosis and bladder wall thickening  -NO evidence of UTI  -Creatinine stable ~1.1  -Recommend out patient follow up      Accelerated HTN due to stress of  pain, nausea and vomiting  -BP improved  -Continue home regimen. Anxiety  -on zoloft and xanax. DISCHARGE DIAGNOSES / PLAN:      See above.        PENDING TEST RESULTS:   At the time of discharge the following test results are still pending: none    FOLLOW UP APPOINTMENTS:    Follow-up Information     Follow up With Details Comments Ginette Mcdonald MD   [address] [place] [address] [facility]      Evelyn Branch MD In 1 week  [address] [facility]  [phone]             ADDITIONAL CARE RECOMMENDATIONS:     DIET: Cardiac Diet    ACTIVITY: Activity as tolerated    WOUND CARE: NA    EQUIPMENT needed: NA      DISCHARGE MEDICATIONS:  Current Discharge Medication List      START taking these medications    Details   oxyCODONE-acetaminophen (PERCOCET) 5-325 mg per tablet Take 1 Tab by mouth every eight (8) hours as needed. Max Daily Amount: 3 Tabs. Qty: 14 Tab, Refills: 0      desmopressin (DDAVP) 0.1 mg tablet Take 1 Tab by mouth every evening for 30 days. Qty: 30 Tab, Refills: 0         CONTINUE these medications which have NOT CHANGED    Details   sertraline (ZOLOFT) 50 mg tablet Take 50 mg by mouth daily. ALPRAZolam (XANAX) 0.5 mg tablet Take 0.5 mg by mouth two (2) times daily as needed for Anxiety. albuterol (PROVENTIL HFA, VENTOLIN HFA, PROAIR HFA) 90 mcg/actuation inhaler Take 2 Puffs by inhalation every six (6) hours as needed for Wheezing or Shortness of Breath. amLODIPine (NORVASC) 10 mg tablet Take 10 mg by mouth daily. metoprolol succinate (TOPROL XL) 100 mg tablet Take 100 mg by mouth daily. NOTIFY YOUR PHYSICIAN FOR ANY OF THE FOLLOWING:   Fever over 101 degrees for 24 hours. Chest pain, shortness of breath, fever, chills, nausea, vomiting, diarrhea, change in mentation, falling, weakness, bleeding. Severe pain or pain not relieved by medications. Or, any other signs or symptoms that you may have questions about.     DISPOSITION:  x  Home With:   OT  PT  HH  RN       Long term SNF/Inpatient Rehab    Independent/assisted living    Hospice    Other:       PATIENT CONDITION AT DISCHARGE:     Functional status    Poor     Deconditioned    x Independent      Cognition   x  Lucid     Forgetful     Dementia      Catheters/lines (plus indication)    Gonzalez     PICC     PEG    x None      Code status    x Full code DNR      PHYSICAL EXAMINATION AT DISCHARGE:     Visit Vitals    BP (!) 156/95 (BP 1 Location: Left arm, BP Patient Position: At rest)    Pulse 64    Temp 97.7 °F (36.5 °C)    Resp 16    Ht 5' 9\" (1.753 m)    Wt 88.5 kg (195 lb)    SpO2 95%    BMI 28.8 kg/m2      O2 Device: Room air    Temp (24hrs), Av.1 °F (36.7 °C), Min:97.7 °F (36.5 °C), Max:98.9 °F (37.2 °C)         190 -  0700  In: 480 [P.O.:480]  Out: -     GENERAL:  Alert, oriented, cooperative, no apparent distress  HEENT:  Normocephalic, atraumatic, non icteric sclerae, non pallor conjuctivae, EOMs intact, PERRLA. NECK: Supple, trachea midline, no adenopathy, no thyromegally or tenderness, no carotid bruit and no JVD. LUNGS:   Vesicular breath sounds bilaterally, no added sounds. HEART:   S1 and S2 well heard,RRR,  no murmur, click, rub or gallop. ABDOMEB:   Soft, non-tender. Normoactive bowel sounds. No masses,  No organomegaly. EXTREMETIES:  Atraumatic, acyanotic, no edema  PULSES: 2+ and symmetric all extremities. SKIN:  No rashes or lesions  NEUROLOGY: Alert and oriented to PPT, CNII-XII intact. Motor and sensory exam grossly intact.       CHRONIC MEDICAL DIAGNOSES:  Problem List as of 2017  Date Reviewed: 2017          Codes Class Noted - Resolved    Gastritis ICD-10-CM: K29.70  ICD-9-CM: 535.50  2017 - Present        Bladder dysfunction (Chronic) ICD-10-CM: N31.9  ICD-9-CM: 596.59  2017 - Present        Hydronephrosis, right ICD-10-CM: N13.30  ICD-9-CM: 040  2017 - Present        Hypernatremia ICD-10-CM: E87.0  ICD-9-CM: 276.0  2017 - Present        Hypertension (Chronic) ICD-10-CM: I10  ICD-9-CM: 401.9  2017 - Present        Di[facility]id[facility] ([facility] (Chronic) ICD-10-CM: E23.2  ICD-9-CM: 253.5  2017 - Present        Abdominal pain ICD-10-CM: R10.9  ICD-9-CM: 789.00  2017 - Present        * (Principal)RESOLVED: Nausea & vomiting ICD-10-CM: R11.2  ICD-9-CM: 787.01  2017 - 12/6/2017                  Signed:    Rocio Mendes MD  12/6/2017  9:19 AM

## 2018-05-16 NOTE — ED NOTES
#[id number] reference for [facility] ride home. Gave SBAR report to nurse Krystin @ [facility].

## 2018-06-28 PROCEDURE — 99285 EMERGENCY DEPT VISIT HI MDM: CPT

## 2018-06-29 ENCOUNTER — HOSPITAL ENCOUNTER (EMERGENCY)
Age: 33
Discharge: HOME | End: 2018-06-29
Attending: EMERGENCY MEDICINE
Payer: MEDICAID

## 2018-06-29 ENCOUNTER — APPOINTMENT (OUTPATIENT)
Dept: CT IMAGING | Age: 33
End: 2018-06-29
Attending: EMERGENCY MEDICINE
Payer: MEDICAID

## 2018-06-29 VITALS
RESPIRATION RATE: 16 BRPM | TEMPERATURE: 97.9 F | HEART RATE: 87 BPM | SYSTOLIC BLOOD PRESSURE: 139 MMHG | BODY MASS INDEX: 28.88 KG/M2 | OXYGEN SATURATION: 96 % | DIASTOLIC BLOOD PRESSURE: 78 MMHG | WEIGHT: 195 LBS | HEIGHT: 69 IN

## 2018-06-29 DIAGNOSIS — N13.30 HYDRONEPHROSIS, UNSPECIFIED HYDRONEPHROSIS TYPE: ICD-10-CM

## 2018-06-29 DIAGNOSIS — R10.84 ABDOMINAL PAIN, GENERALIZED: Primary | ICD-10-CM

## 2018-06-29 DIAGNOSIS — R11.2 NON-INTRACTABLE VOMITING WITH NAUSEA, UNSPECIFIED VOMITING TYPE: ICD-10-CM

## 2018-06-29 LAB
ALBUMIN SERPL-MCNC: 4.5 G/DL (ref 3.5–5)
ALBUMIN/GLOB SERPL: 1.1 {RATIO} (ref 1.1–2.2)
ALP SERPL-CCNC: 87 U/L (ref 45–117)
ALT SERPL-CCNC: 27 U/L (ref 12–78)
ANION GAP SERPL CALC-SCNC: 11 MMOL/L (ref 5–15)
APPEARANCE UR: CLEAR
AST SERPL-CCNC: 20 U/L (ref 15–37)
BACTERIA URNS QL MICRO: NEGATIVE /HPF
BASOPHILS # BLD: 0 K/UL (ref 0–0.1)
BASOPHILS NFR BLD: 0 % (ref 0–1)
BILIRUB SERPL-MCNC: 0.4 MG/DL (ref 0.2–1)
BILIRUB UR QL: NEGATIVE
BUN SERPL-MCNC: 12 MG/DL (ref 6–20)
BUN/CREAT SERPL: 9 (ref 12–20)
CALCIUM SERPL-MCNC: 10 MG/DL (ref 8.5–10.1)
CHLORIDE SERPL-SCNC: 113 MMOL/L (ref 97–108)
CO2 SERPL-SCNC: 27 MMOL/L (ref 21–32)
COLOR UR: ABNORMAL
CREAT SERPL-MCNC: 1.32 MG/DL (ref 0.7–1.3)
DIFFERENTIAL METHOD BLD: ABNORMAL
EOSINOPHIL # BLD: 0 K/UL (ref 0–0.4)
EOSINOPHIL NFR BLD: 0 % (ref 0–7)
EPITH CASTS URNS QL MICRO: ABNORMAL /LPF
ERYTHROCYTE [DISTWIDTH] IN BLOOD BY AUTOMATED COUNT: 13.4 % (ref 11.5–14.5)
GLOBULIN SER CALC-MCNC: 4 G/DL (ref 2–4)
GLUCOSE SERPL-MCNC: 117 MG/DL (ref 65–100)
GLUCOSE UR STRIP.AUTO-MCNC: NEGATIVE MG/DL
HCT VFR BLD AUTO: 47 % (ref 36.6–50.3)
HGB BLD-MCNC: 16.1 G/DL (ref 12.1–17)
HGB UR QL STRIP: NEGATIVE
HYALINE CASTS URNS QL MICRO: ABNORMAL /LPF (ref 0–5)
IMM GRANULOCYTES # BLD: 0 K/UL
IMM GRANULOCYTES NFR BLD AUTO: 0 %
KETONES UR QL STRIP.AUTO: 15 MG/DL
LEUKOCYTE ESTERASE UR QL STRIP.AUTO: NEGATIVE
LIPASE SERPL-CCNC: 230 U/L (ref 73–393)
LYMPHOCYTES # BLD: 0.6 K/UL (ref 0.8–3.5)
LYMPHOCYTES NFR BLD: 7 % (ref 12–49)
MCH RBC QN AUTO: 31.8 PG (ref 26–34)
MCHC RBC AUTO-ENTMCNC: 34.3 G/DL (ref 30–36.5)
MCV RBC AUTO: 92.9 FL (ref 80–99)
METAMYELOCYTES NFR BLD MANUAL: 1 %
MONOCYTES # BLD: 0.6 K/UL (ref 0–1)
MONOCYTES NFR BLD: 7 % (ref 5–13)
NEUTS BAND NFR BLD MANUAL: 1 % (ref 0–6)
NEUTS SEG # BLD: 6.8 K/UL (ref 1.8–8)
NEUTS SEG NFR BLD: 84 % (ref 32–75)
NITRITE UR QL STRIP.AUTO: NEGATIVE
NRBC # BLD: 0 K/UL (ref 0–0.01)
NRBC BLD-RTO: 0 PER 100 WBC
PH UR STRIP: 6.5 [PH] (ref 5–8)
PLATELET # BLD AUTO: 271 K/UL (ref 150–400)
PMV BLD AUTO: 10.6 FL (ref 8.9–12.9)
POTASSIUM SERPL-SCNC: 3.8 MMOL/L (ref 3.5–5.1)
PROT SERPL-MCNC: 8.5 G/DL (ref 6.4–8.2)
PROT UR STRIP-MCNC: 30 MG/DL
RBC # BLD AUTO: 5.06 M/UL (ref 4.1–5.7)
RBC #/AREA URNS HPF: ABNORMAL /HPF (ref 0–5)
RBC MORPH BLD: ABNORMAL
SODIUM SERPL-SCNC: 151 MMOL/L (ref 136–145)
SP GR UR REFRACTOMETRY: 1.01 (ref 1–1.03)
UR CULT HOLD, URHOLD: NORMAL
UROBILINOGEN UR QL STRIP.AUTO: 0.2 EU/DL (ref 0.2–1)
WBC # BLD AUTO: 8 K/UL (ref 4.1–11.1)
WBC URNS QL MICRO: ABNORMAL /HPF (ref 0–4)

## 2018-06-29 PROCEDURE — 83690 ASSAY OF LIPASE: CPT | Performed by: EMERGENCY MEDICINE

## 2018-06-29 PROCEDURE — 74011000258 HC RX REV CODE- 258: Performed by: EMERGENCY MEDICINE

## 2018-06-29 PROCEDURE — 80053 COMPREHEN METABOLIC PANEL: CPT | Performed by: EMERGENCY MEDICINE

## 2018-06-29 PROCEDURE — 74011636320 HC RX REV CODE- 636/320: Performed by: EMERGENCY MEDICINE

## 2018-06-29 PROCEDURE — 36415 COLL VENOUS BLD VENIPUNCTURE: CPT | Performed by: EMERGENCY MEDICINE

## 2018-06-29 PROCEDURE — 74177 CT ABD & PELVIS W/CONTRAST: CPT

## 2018-06-29 PROCEDURE — 74011000250 HC RX REV CODE- 250: Performed by: EMERGENCY MEDICINE

## 2018-06-29 PROCEDURE — 81001 URINALYSIS AUTO W/SCOPE: CPT | Performed by: EMERGENCY MEDICINE

## 2018-06-29 PROCEDURE — 96375 TX/PRO/DX INJ NEW DRUG ADDON: CPT

## 2018-06-29 PROCEDURE — 96361 HYDRATE IV INFUSION ADD-ON: CPT

## 2018-06-29 PROCEDURE — 74011250636 HC RX REV CODE- 250/636: Performed by: EMERGENCY MEDICINE

## 2018-06-29 PROCEDURE — 85025 COMPLETE CBC W/AUTO DIFF WBC: CPT | Performed by: EMERGENCY MEDICINE

## 2018-06-29 PROCEDURE — 96372 THER/PROPH/DIAG INJ SC/IM: CPT

## 2018-06-29 PROCEDURE — 96374 THER/PROPH/DIAG INJ IV PUSH: CPT

## 2018-06-29 RX ORDER — SODIUM CHLORIDE 9 MG/ML
1000 INJECTION, SOLUTION INTRAVENOUS ONCE
Status: COMPLETED | OUTPATIENT
Start: 2018-06-29 | End: 2018-06-29

## 2018-06-29 RX ORDER — KETOROLAC TROMETHAMINE 30 MG/ML
30 INJECTION, SOLUTION INTRAMUSCULAR; INTRAVENOUS
Status: COMPLETED | OUTPATIENT
Start: 2018-06-29 | End: 2018-06-29

## 2018-06-29 RX ORDER — SODIUM CHLORIDE 0.9 % (FLUSH) 0.9 %
10 SYRINGE (ML) INJECTION
Status: COMPLETED | OUTPATIENT
Start: 2018-06-29 | End: 2018-06-29

## 2018-06-29 RX ORDER — DICYCLOMINE HYDROCHLORIDE 10 MG/ML
20 INJECTION INTRAMUSCULAR
Status: COMPLETED | OUTPATIENT
Start: 2018-06-29 | End: 2018-06-29

## 2018-06-29 RX ORDER — PROMETHAZINE HYDROCHLORIDE 25 MG/1
25 TABLET ORAL
Qty: 10 TAB | Refills: 0 | Status: SHIPPED | OUTPATIENT
Start: 2018-06-29

## 2018-06-29 RX ORDER — METOCLOPRAMIDE HYDROCHLORIDE 5 MG/ML
10 INJECTION INTRAMUSCULAR; INTRAVENOUS
Status: COMPLETED | OUTPATIENT
Start: 2018-06-29 | End: 2018-06-29

## 2018-06-29 RX ORDER — ONDANSETRON 2 MG/ML
4 INJECTION INTRAMUSCULAR; INTRAVENOUS
Status: COMPLETED | OUTPATIENT
Start: 2018-06-29 | End: 2018-06-29

## 2018-06-29 RX ADMIN — KETOROLAC TROMETHAMINE 30 MG: 30 INJECTION, SOLUTION INTRAMUSCULAR at 03:19

## 2018-06-29 RX ADMIN — SODIUM CHLORIDE 1000 ML: 900 INJECTION, SOLUTION INTRAVENOUS at 02:18

## 2018-06-29 RX ADMIN — ONDANSETRON 4 MG: 2 INJECTION INTRAMUSCULAR; INTRAVENOUS at 02:19

## 2018-06-29 RX ADMIN — SODIUM CHLORIDE 1000 ML: 900 INJECTION, SOLUTION INTRAVENOUS at 01:58

## 2018-06-29 RX ADMIN — DICYCLOMINE HYDROCHLORIDE 20 MG: 20 INJECTION, SOLUTION INTRAMUSCULAR at 01:58

## 2018-06-29 RX ADMIN — FAMOTIDINE 20 MG: 10 INJECTION INTRAVENOUS at 03:18

## 2018-06-29 RX ADMIN — Medication 10 ML: at 01:40

## 2018-06-29 RX ADMIN — SODIUM CHLORIDE 100 ML: 900 INJECTION, SOLUTION INTRAVENOUS at 01:40

## 2018-06-29 RX ADMIN — METOCLOPRAMIDE 10 MG: 5 INJECTION, SOLUTION INTRAMUSCULAR; INTRAVENOUS at 01:58

## 2018-06-29 RX ADMIN — IOPAMIDOL 100 ML: 755 INJECTION, SOLUTION INTRAVENOUS at 01:40

## 2018-06-29 NOTE — ED NOTES
Reassessed patient as he returned from CT scan. Patient fell asleep while RN educating him about his medications. He was given Bentyl IM and his IV infiltrated. Working on getting second IV established.

## 2018-06-29 NOTE — ED TRIAGE NOTES
Patient arrives via EMS from home for n/v since today. [facility]. Patient stated that he wanted to be seen here because \"they always find something the other hospital cant. \"

## 2018-06-29 NOTE — ED NOTES
Assumed care of patient at this time. Patient states that he is having severe pain in his abdomen. Additionally reports that he has nausea, vomiting, diarrhea, chest pain and shortness of breath. He states that he was at [facility] this afternoon for similar complaints. Patient is writhing in the bed at this time, in tears due to pain. Connected to monitor. Will get IV and blood work shortly.

## 2018-06-29 NOTE — ED PROVIDER NOTES
HPI Comments: 28 yo male presents with cc of nausea, vomiting, and abdominal pain. Symptoms began on Wednesday, yesterday. Pt reports 6 episodes of nonbloody vomiting. Pt had diarrhea but that has resolved. Pt with generalized sharp pain to abdomen. Does not radiate. Rates pain 10/10. No recent travel, no recent antibiotics. no chest pain, shortness of breath, difficulty breathing. No cough, congestion, runny nose or sore throat. No known sick contacts. Pt was seen at [facility] earlier today and reports having and xray and labs done. He was discharged with zofran but reports no relief. No known precipitating events. Pt with hx of diabetic gastroparesis. No alleviating factors. Social hx  +smoker  No alcohol    Patient is a 29 y.o. male presenting with vomiting. The history is provided by the patient. Vomiting    Associated symptoms include abdominal pain and diarrhea. Pertinent negatives include no chills, no fever, no headaches, no arthralgias, no myalgias and no headaches. Past Medical History:   Diagnosis Date    Asthma     Bronchitis    Diabetes ([facility]     Gastroparesis     Hypertension     Psychiatric disorder     anxiety       History reviewed. No pertinent surgical history. History reviewed. No pertinent family history. Social History     Social History    Marital status: SINGLE     Spouse name: N/A    Number of children: N/A    Years of education: N/A     Occupational History    Not on file. Social History Main Topics    Smoking status: Current Some Day Smoker    Smokeless tobacco: Never Used    Alcohol use No    Drug use: No    Sexual activity: Not on file     Other Topics Concern    Not on file     Social History Narrative         ALLERGIES: Review of patient's allergies indicates no active allergies. Review of Systems   Constitutional: Negative for chills and fever. Respiratory: Negative for chest tightness, shortness of breath and wheezing. Cardiovascular: Negative for chest pain and palpitations. Gastrointestinal: Positive for abdominal pain, diarrhea, nausea and vomiting. Negative for constipation and rectal pain. Genitourinary: Negative for difficulty urinating, dysuria, frequency, hematuria and testicular pain. Musculoskeletal: Negative for arthralgias, back pain, myalgias, neck pain and neck stiffness. Skin: Negative for rash and wound. Neurological: Negative for dizziness, light-headedness and headaches. All other systems reviewed and are negative. Vitals:    06/28/18 2351   BP: (!) 157/98   Pulse: 76   Resp: 16   Temp: 99.2 °F (37.3 °C)   SpO2: 98%   Weight: 88.5 kg (195 lb)   Height: 5' 9\" (1.753 m)            Physical Exam   Constitutional: He is oriented to person, place, and time. He appears well-developed and well-nourished. No distress. HENT:   Head: Normocephalic and atraumatic. Right Ear: External ear normal.   Left Ear: External ear normal.   Eyes: Conjunctivae and EOM are normal. Pupils are equal, round, and reactive to light. Neck: Normal range of motion. Neck supple. Cardiovascular: Normal rate, regular rhythm and normal heart sounds. Pulmonary/Chest: Effort normal and breath sounds normal. No respiratory distress. He has no wheezes. Abdominal: Soft. Normal appearance and bowel sounds are normal. He exhibits no shifting dullness, no distension, no pulsatile liver, no abdominal bruit, no pulsatile midline mass and no mass. There is no hepatosplenomegaly, splenomegaly or hepatomegaly. There is tenderness. There is no rigidity, no rebound, no guarding, no CVA tenderness, no tenderness at McBurney's point and negative Duggan's sign. Abdomen exposed for exam.  Pt tender diffusely across abdomen. No focal point of pain. No peritoneal signs. Musculoskeletal: Normal range of motion. He exhibits no edema or tenderness. Neurological: He is alert and oriented to person, place, and time.  He has normal reflexes. No cranial nerve deficit. Coordination normal.   Skin: Skin is warm and dry. No rash noted. No erythema. Psychiatric: He has a normal mood and affect. His behavior is normal. Judgment and thought content normal.   Nursing note and vitals reviewed. MDM  Number of Diagnoses or Management Options  Abdominal pain, generalized:   Hydronephrosis, unspecified hydronephrosis type:   Non-intractable vomiting with nausea, unspecified vomiting type:   Diagnosis management comments: 30 yo male with nausea, vomiting, abdominal pain, diarrhea. Abdomen soft, no peritoneal signs. Lungs clear. No fever. Nontoxic. Viral, gastroparesis, obstruction, colitis, appy  P: labs, ua, ct, iv fluid. 4:32 AM  Pt tolerating liquids. Patient is well hydrated, well appearing, and in no respiratory distress. Physical exam is reassuring, and without signs of serious illness. Given the patient's history, clinical course, physical exam, and imaging findings, abdominal pain is unlikely secondary to a surgical etiology. Patient will be discharged home with pain control and follow-up with primary care physician in one to two days. Patient and caregivers were instructed on signs and symptoms of reasons to return including fever, worsening pain, vomiting, blood in the stool or any other concerns. Patient's results have been reviewed with them. Patient and/or family have verbally conveyed their understanding and agreement of the patient's signs, symptoms, diagnosis, treatment and prognosis and additionally agree to follow up as recommended or return to the Emergency Room should their condition change prior to follow-up. Discharge instructions have also been provided to the patient with some educational information regarding their diagnosis as well a list of reasons why they would want to return to the ER prior to their follow-up appointment should their condition change.             Patient Progress  Patient progress: stable        ED Course       Procedures             Pt case including HPI, PE, and all available lab and radiology results has been discussed with attending physician. Opportunity to evaluate patient has been provided to ER attending. Discharge and prescription plan has been agreed upon.

## 2018-06-29 NOTE — ED NOTES
Discharge instructions reviewed with the patient who verbalized understanding. Pt ambulatory from the department.

## 2018-06-29 NOTE — ED NOTES
RN in room with patient to establish IV. While sticking patient for blood work, he fell asleep. Patient was nudged awake and he immediately fell back to sleep. He remains on the monitor. Will medicate shortly. CM x 2. Call bell within reach.

## 2018-06-29 NOTE — ED NOTES
Went into patient's room to discuss medications that have been ordered. Patient snoring. Brought medications to bedside and woke patient up to discuss medications. He states he is in severe pain and rubbed his belly.

## 2018-06-29 NOTE — DISCHARGE INSTRUCTIONS
Abdominal Pain: Care Instructions  Your Care Instructions    Abdominal pain has many possible causes. Some aren't serious and get better on their own in a few days. Others need more testing and treatment. If your pain continues or gets worse, you need to be rechecked and may need more tests to find out what is wrong. You may need surgery to correct the problem. Don't ignore new symptoms, such as fever, nausea and vomiting, urination problems, pain that gets worse, and dizziness. These may be signs of a more serious problem. Your doctor may have recommended a follow-up visit in the next 8 to 12 hours. If you are not getting better, you may need more tests or treatment. The doctor has checked you carefully, but problems can develop later. If you notice any problems or new symptoms, get medical treatment right away. Follow-up care is a key part of your treatment and safety. Be sure to make and go to all appointments, and call your doctor if you are having problems. It's also a good idea to know your test results and keep a list of the medicines you take. How can you care for yourself at home? · Rest until you feel better. · To prevent dehydration, drink plenty of fluids, enough so that your urine is light yellow or clear like water. Choose water and other caffeine-free clear liquids until you feel better. If you have kidney, heart, or liver disease and have to limit fluids, talk with your doctor before you increase the amount of fluids you drink. · If your stomach is upset, eat mild foods, such as rice, dry toast or crackers, bananas, and applesauce. Try eating several small meals instead of two or three large ones. · Wait until 48 hours after all symptoms have gone away before you have spicy foods, alcohol, and drinks that contain caffeine. · Do not eat foods that are high in fat. · Avoid anti-inflammatory medicines such as aspirin, ibuprofen (Advil, Motrin), and naproxen (Aleve).  These can cause stomach upset. Talk to your doctor if you take daily aspirin for another health problem. When should you call for help? Call 911 anytime you think you may need emergency care. For example, call if:  ? · You passed out (lost consciousness). ? · You pass maroon or very bloody stools. ? · You vomit blood or what looks like coffee grounds. ? · You have new, severe belly pain. ?Call your doctor now or seek immediate medical care if:  ? · Your pain gets worse, especially if it becomes focused in one area of your belly. ? · You have a new or higher fever. ? · Your stools are black and look like tar, or they have streaks of blood. ? · You have unexpected vaginal bleeding. ? · You have symptoms of a urinary tract infection. These may include:  ¨ Pain when you urinate. ¨ Urinating more often than usual.  ¨ Blood in your urine. ? · You are dizzy or lightheaded, or you feel like you may faint. ? Watch closely for changes in your health, and be sure to contact your doctor if:  ? · You are not getting better after 1 day (24 hours). Where can you learn more? Go to http://ramanaiPowowtere.info/. Enter T294 in the search box to learn more about \"Abdominal Pain: Care Instructions. \"  Current as of: March 20, 2017  Content Version: 11.4  © 7736-6605 [employer]. Care instructions adapted under license by [employer] (which disclaims liability or warranty for this information). If you have questions about a medical condition or this instruction, always ask your healthcare professional. [place] [address] any warranty or liability for your use of this information. Nausea and Vomiting: Care Instructions  Your Care Instructions    When you are nauseated, you may feel weak and sweaty and notice a lot of saliva in your mouth. Nausea often leads to vomiting.  Most of the time you do not need to worry about nausea and vomiting, but they can be signs of other illnesses. Two common causes of nausea and vomiting are stomach flu and food poisoning. Nausea and vomiting from viral stomach flu will usually start to improve within 24 hours. Nausea and vomiting from food poisoning may last from 12 to 48 hours. The doctor has checked you carefully, but problems can develop later. If you notice any problems or new symptoms, get medical treatment right away. Follow-up care is a key part of your treatment and safety. Be sure to make and go to all appointments, and call your doctor if you are having problems. It's also a good idea to know your test results and keep a list of the medicines you take. How can you care for yourself at home? · To prevent dehydration, drink plenty of fluids, enough so that your urine is light yellow or clear like water. Choose water and other caffeine-free clear liquids until you feel better. If you have kidney, heart, or liver disease and have to limit fluids, talk with your doctor before you increase the amount of fluids you drink. · Rest in bed until you feel better. · When you are able to eat, try clear soups, mild foods, and liquids until all symptoms are gone for 12 to 48 hours. Other good choices include dry toast, crackers, cooked cereal, and gelatin dessert, such as Jell-O. When should you call for help? Call 911 anytime you think you may need emergency care. For example, call if:  ? · You passed out (lost consciousness). ?Call your doctor now or seek immediate medical care if:  ? · You have symptoms of dehydration, such as:  ¨ Dry eyes and a dry mouth. ¨ Passing only a little dark urine. ¨ Feeling thirstier than usual.   ? · You have new or worsening belly pain. ? · You have a new or higher fever. ? · You vomit blood or what looks like coffee grounds. ? Watch closely for changes in your health, and be sure to contact your doctor if:  ? · You have ongoing nausea and vomiting. ? · Your vomiting is getting worse.    ? · Your vomiting lasts longer than 2 days. ? · You are not getting better as expected. Where can you learn more? Go to http://ramana-tere.info/. Enter 25 [id number] in the search box to learn more about \"Nausea and Vomiting: Care Instructions. \"  Current as of: March 20, 2017  Content Version: 11.4  © 3963-4026 [employer]. Care instructions adapted under license by [employer] (which disclaims liability or warranty for this information). If you have questions about a medical condition or this instruction, always ask your healthcare professional. [place] [address] any warranty or liability for your use of this information.

## 2020-01-16 ENCOUNTER — HOSPITAL ENCOUNTER (EMERGENCY)
Age: 35
Discharge: HOME | End: 2020-01-16
Attending: EMERGENCY MEDICINE
Payer: MEDICAID

## 2020-01-16 VITALS
RESPIRATION RATE: 18 BRPM | SYSTOLIC BLOOD PRESSURE: 164 MMHG | TEMPERATURE: 98.6 F | OXYGEN SATURATION: 96 % | DIASTOLIC BLOOD PRESSURE: 89 MMHG | HEART RATE: 97 BPM

## 2020-01-16 DIAGNOSIS — R74.01 TRANSAMINITIS: ICD-10-CM

## 2020-01-16 DIAGNOSIS — R51.9 NONINTRACTABLE HEADACHE, UNSPECIFIED CHRONICITY PATTERN, UNSPECIFIED HEADACHE TYPE: ICD-10-CM

## 2020-01-16 DIAGNOSIS — R10.84 ABDOMINAL PAIN, GENERALIZED: Primary | ICD-10-CM

## 2020-01-16 LAB
ALBUMIN SERPL-MCNC: 4.4 G/DL (ref 3.5–5)
ALBUMIN/GLOB SERPL: 1 {RATIO} (ref 1.1–2.2)
ALP SERPL-CCNC: 104 U/L (ref 45–117)
ALT SERPL-CCNC: 155 U/L (ref 12–78)
ANION GAP SERPL CALC-SCNC: 6 MMOL/L (ref 5–15)
AST SERPL-CCNC: 112 U/L (ref 15–37)
BASOPHILS # BLD: 0 K/UL (ref 0–0.1)
BASOPHILS NFR BLD: 0 % (ref 0–1)
BILIRUB SERPL-MCNC: 0.4 MG/DL (ref 0.2–1)
BUN SERPL-MCNC: 8 MG/DL (ref 6–20)
BUN/CREAT SERPL: 7 (ref 12–20)
CALCIUM SERPL-MCNC: 9.7 MG/DL (ref 8.5–10.1)
CHLORIDE SERPL-SCNC: 108 MMOL/L (ref 97–108)
CO2 SERPL-SCNC: 28 MMOL/L (ref 21–32)
COMMENT, HOLDF: NORMAL
CREAT SERPL-MCNC: 1.14 MG/DL (ref 0.7–1.3)
DIFFERENTIAL METHOD BLD: ABNORMAL
EOSINOPHIL # BLD: 0 K/UL (ref 0–0.4)
EOSINOPHIL NFR BLD: 0 % (ref 0–7)
ERYTHROCYTE [DISTWIDTH] IN BLOOD BY AUTOMATED COUNT: 13.6 % (ref 11.5–14.5)
GLOBULIN SER CALC-MCNC: 4.4 G/DL (ref 2–4)
GLUCOSE SERPL-MCNC: 113 MG/DL (ref 65–100)
HCT VFR BLD AUTO: 48.8 % (ref 36.6–50.3)
HETEROPH AB BLD QL IA: NEGATIVE
HGB BLD-MCNC: 16.4 G/DL (ref 12.1–17)
IMM GRANULOCYTES # BLD AUTO: 0 K/UL (ref 0–0.04)
IMM GRANULOCYTES NFR BLD AUTO: 0 % (ref 0–0.5)
LIPASE SERPL-CCNC: 378 U/L (ref 73–393)
LYMPHOCYTES # BLD: 1 K/UL (ref 0.8–3.5)
LYMPHOCYTES NFR BLD: 14 % (ref 12–49)
MCH RBC QN AUTO: 31.9 PG (ref 26–34)
MCHC RBC AUTO-ENTMCNC: 33.6 G/DL (ref 30–36.5)
MCV RBC AUTO: 94.9 FL (ref 80–99)
MONOCYTES # BLD: 0.5 K/UL (ref 0–1)
MONOCYTES NFR BLD: 7 % (ref 5–13)
NEUTS SEG # BLD: 5.9 K/UL (ref 1.8–8)
NEUTS SEG NFR BLD: 79 % (ref 32–75)
NRBC # BLD: 0 K/UL (ref 0–0.01)
NRBC BLD-RTO: 0 PER 100 WBC
PLATELET # BLD AUTO: 283 K/UL (ref 150–400)
PMV BLD AUTO: 10 FL (ref 8.9–12.9)
POTASSIUM SERPL-SCNC: 3.7 MMOL/L (ref 3.5–5.1)
PROT SERPL-MCNC: 8.8 G/DL (ref 6.4–8.2)
RBC # BLD AUTO: 5.14 M/UL (ref 4.1–5.7)
SAMPLES BEING HELD,HOLD: NORMAL
SODIUM SERPL-SCNC: 142 MMOL/L (ref 136–145)
WBC # BLD AUTO: 7.5 K/UL (ref 4.1–11.1)

## 2020-01-16 PROCEDURE — 85025 COMPLETE CBC W/AUTO DIFF WBC: CPT

## 2020-01-16 PROCEDURE — 96375 TX/PRO/DX INJ NEW DRUG ADDON: CPT

## 2020-01-16 PROCEDURE — 99284 EMERGENCY DEPT VISIT MOD MDM: CPT

## 2020-01-16 PROCEDURE — 96374 THER/PROPH/DIAG INJ IV PUSH: CPT

## 2020-01-16 PROCEDURE — 74011250636 HC RX REV CODE- 250/636: Performed by: EMERGENCY MEDICINE

## 2020-01-16 PROCEDURE — 36415 COLL VENOUS BLD VENIPUNCTURE: CPT

## 2020-01-16 PROCEDURE — 74011000250 HC RX REV CODE- 250: Performed by: EMERGENCY MEDICINE

## 2020-01-16 PROCEDURE — 96361 HYDRATE IV INFUSION ADD-ON: CPT

## 2020-01-16 PROCEDURE — 86308 HETEROPHILE ANTIBODY SCREEN: CPT

## 2020-01-16 PROCEDURE — 83690 ASSAY OF LIPASE: CPT

## 2020-01-16 PROCEDURE — 80053 COMPREHEN METABOLIC PANEL: CPT

## 2020-01-16 RX ORDER — PROCHLORPERAZINE EDISYLATE 5 MG/ML
10 INJECTION INTRAMUSCULAR; INTRAVENOUS
Status: DISCONTINUED | OUTPATIENT
Start: 2020-01-16 | End: 2020-01-16 | Stop reason: SDUPTHER

## 2020-01-16 RX ORDER — DIPHENHYDRAMINE HYDROCHLORIDE 50 MG/ML
25 INJECTION, SOLUTION INTRAMUSCULAR; INTRAVENOUS
Status: COMPLETED | OUTPATIENT
Start: 2020-01-16 | End: 2020-01-16

## 2020-01-16 RX ORDER — DEXAMETHASONE SODIUM PHOSPHATE 10 MG/ML
10 INJECTION INTRAMUSCULAR; INTRAVENOUS ONCE
Status: COMPLETED | OUTPATIENT
Start: 2020-01-16 | End: 2020-01-16

## 2020-01-16 RX ORDER — KETOROLAC TROMETHAMINE 30 MG/ML
30 INJECTION, SOLUTION INTRAMUSCULAR; INTRAVENOUS
Status: DISCONTINUED | OUTPATIENT
Start: 2020-01-16 | End: 2020-01-16

## 2020-01-16 RX ORDER — KETOROLAC TROMETHAMINE 30 MG/ML
30 INJECTION, SOLUTION INTRAMUSCULAR; INTRAVENOUS
Status: COMPLETED | OUTPATIENT
Start: 2020-01-16 | End: 2020-01-16

## 2020-01-16 RX ADMIN — SODIUM CHLORIDE 10 MG: 9 INJECTION INTRAMUSCULAR; INTRAVENOUS; SUBCUTANEOUS at 18:06

## 2020-01-16 RX ADMIN — SODIUM CHLORIDE 1000 ML: 900 INJECTION, SOLUTION INTRAVENOUS at 18:04

## 2020-01-16 RX ADMIN — DEXAMETHASONE SODIUM PHOSPHATE 10 MG: 10 INJECTION INTRAMUSCULAR; INTRAVENOUS at 18:06

## 2020-01-16 RX ADMIN — KETOROLAC TROMETHAMINE 30 MG: 30 INJECTION, SOLUTION INTRAMUSCULAR at 18:06

## 2020-01-16 RX ADMIN — DIPHENHYDRAMINE HYDROCHLORIDE 25 MG: 50 INJECTION, SOLUTION INTRAMUSCULAR; INTRAVENOUS at 18:05

## 2020-01-16 NOTE — ED PROVIDER NOTES
Date of Service:  1/16/2020    Patient:  Promise Velásquez    Chief Complaint:  Abdominal Pain       HPI:  Promise Velásquez is a 30 y.o.  male who presents for evaluation of abdominal pain. The pain has been present for two days. Pain is 10/10 generalized abdominal pain. Nothing has made it better. It did get worse after eating. No diarrhea. Minimal nausea and vomiting. No problems urinating. Pain is an ache. No CP/SOB. Decreased appetite but no problems with drinking water. Chills, no fevers. No travel. NO etoh/tobacco/drugs. Headaches since this morning after being seen at [facility] yesterday for same abdominal complaints. HA all over. No dizzy symptoms or light headedness. No change in vision. No problems ambulating. No other acute complaints or modifying factors. Patient non compliant with medication since feeling sick. Past Medical History:   Diagnosis Date    Asthma     Bronchitis    Diabetes ([facility] [address]     Gastroparesis     Hypertension     Psychiatric disorder     anxiety       History reviewed. No pertinent surgical history. History reviewed. No pertinent family history.     Social History     Socioeconomic History    Marital status: SINGLE     Spouse name: Not on file    Number of children: Not on file    Years of education: Not on file    Highest education level: Not on file   Occupational History    Not on file   Social Needs    Financial resource strain: Not on file    Food insecurity:     Worry: Not on file     Inability: Not on file    Transportation needs:     Medical: Not on file     Non-medical: Not on file   Tobacco Use    Smoking status: Current Some Day Smoker    Smokeless tobacco: Never Used   Substance and Sexual Activity    Alcohol use: No    Drug use: No    Sexual activity: Not on file   Lifestyle    Physical activity:     Days per week: Not on file     Minutes per session: Not on file    Stress: Not on file   Relationships    Social connections:     Talks on phone: Not on file     Gets together: Not on file     Attends Christianity service: Not on file     Active member of club or organization: Not on file     Attends meetings of clubs or organizations: Not on file     Relationship status: Not on file    Intimate partner violence:     Fear of current or ex partner: Not on file     Emotionally abused: Not on file     Physically abused: Not on file     Forced sexual activity: Not on file   Other Topics Concern    Not on file   Social History Narrative    Not on file         ALLERGIES: Morphine    Review of Systems   Constitutional: Positive for appetite change and chills. Negative for fever. HENT: Negative for hearing loss and trouble swallowing. Eyes: Negative for redness and visual disturbance. Respiratory: Positive for shortness of breath. Negative for cough, chest tightness, wheezing and stridor. Cardiovascular: Negative for chest pain and palpitations. Gastrointestinal: Positive for abdominal pain, nausea and vomiting. Negative for constipation. Endocrine: Positive for polyuria. Genitourinary: Negative for difficulty urinating, dysuria and flank pain. Musculoskeletal: Negative for back pain. Skin: Negative for rash. Neurological: Negative for dizziness, seizures, weakness and numbness. Psychiatric/Behavioral: Positive for agitation. Negative for suicidal ideas. Vitals:    01/16/20 1451   BP: (!) 205/106   Pulse: 73   Resp: 18   SpO2: 100%            Physical Exam  Constitutional:       General: He is not in acute distress. Appearance: He is well-developed. He is not ill-appearing or toxic-appearing. HENT:      Head: Normocephalic and atraumatic. Mouth/Throat:      Mouth: Mucous membranes are moist.      Pharynx: No pharyngeal swelling or oropharyngeal exudate. Eyes:      General: No scleral icterus. Extraocular Movements: Extraocular movements intact.       Conjunctiva/sclera: Conjunctivae normal.      Pupils: Pupils are equal, round, and reactive to light. Neck:      Musculoskeletal: Neck supple. Vascular: No JVD. Trachea: No tracheal deviation. Cardiovascular:      Rate and Rhythm: Normal rate and regular rhythm. Heart sounds: No murmur. No friction rub. Pulmonary:      Effort: Pulmonary effort is normal. No respiratory distress. Breath sounds: Normal breath sounds. Abdominal:      General: Abdomen is flat. There is no distension. Palpations: Abdomen is soft. Tenderness: There is generalized tenderness. There is right CVA tenderness. There is no left CVA tenderness. Negative signs include Duggan's sign and McBurney's sign. Musculoskeletal:         General: No tenderness or deformity. Skin:     General: Skin is warm and dry. Capillary Refill: Capillary refill takes less than 2 seconds. Findings: No rash. Neurological:      Mental Status: He is alert and oriented to person, place, and time. Psychiatric:         Behavior: Behavior normal.         Thought Content: Thought content normal.         Judgment: Judgment normal.          MDM  Number of Diagnoses or Management Options  Abdominal pain, generalized:   Nonintractable headache, unspecified chronicity pattern, unspecified headache type:   Transaminitis:      Amount and/or Complexity of Data Reviewed  Decide to obtain previous medical records or to obtain history from someone other than the patient: yes      ED Course as of Jan 16 1953   Thu Jan 16, 2020   Na Kopci 694 CT per [facility] Physician (on Phone) - bladder distension. UA normal. Transaminitis. Sent home with zofran.      [GG]      ED Course User Index  [GG] Nell Chowdhury, DO     VITAL SIGNS:  Patient Vitals for the past 4 hrs:   Temp Pulse BP SpO2   01/16/20 1933 98.6 °F (37 °C) 97 164/89 96 %         LABS:  Recent Results (from the past 6 hour(s))   CBC WITH AUTOMATED DIFF    Collection Time: 01/16/20  3:11 PM   Result Value Ref Range    WBC 7.5 4.1 - 11.1 K/uL    RBC 5.14 4.10 - 5.70 M/uL    HGB 16.4 12.1 - 17.0 g/dL    HCT 48.8 36.6 - 50.3 %    MCV 94.9 80.0 - 99.0 FL    MCH 31.9 26.0 - 34.0 PG    MCHC 33.6 30.0 - 36.5 g/dL    RDW 13.6 11.5 - 14.5 %    PLATELET 427 205 - 805 K/uL    MPV 10.0 8.9 - 12.9 FL    NRBC 0.0 0  WBC    ABSOLUTE NRBC 0.00 0.00 - 0.01 K/uL    NEUTROPHILS 79 (H) 32 - 75 %    LYMPHOCYTES 14 12 - 49 %    MONOCYTES 7 5 - 13 %    EOSINOPHILS 0 0 - 7 %    BASOPHILS 0 0 - 1 %    IMMATURE GRANULOCYTES 0 0.0 - 0.5 %    ABS. NEUTROPHILS 5.9 1.8 - 8.0 K/UL    ABS. LYMPHOCYTES 1.0 0.8 - 3.5 K/UL    ABS. MONOCYTES 0.5 0.0 - 1.0 K/UL    ABS. EOSINOPHILS 0.0 0.0 - 0.4 K/UL    ABS. BASOPHILS 0.0 0.0 - 0.1 K/UL    ABS. IMM. GRANS. 0.0 0.00 - 0.04 K/UL    DF AUTOMATED     METABOLIC PANEL, COMPREHENSIVE    Collection Time: 01/16/20  3:11 PM   Result Value Ref Range    Sodium 142 136 - 145 mmol/L    Potassium 3.7 3.5 - 5.1 mmol/L    Chloride 108 97 - 108 mmol/L    CO2 28 21 - 32 mmol/L    Anion gap 6 5 - 15 mmol/L    Glucose 113 (H) 65 - 100 mg/dL    BUN 8 6 - 20 MG/DL    Creatinine 1.14 0.70 - 1.30 MG/DL    BUN/Creatinine ratio 7 (L) 12 - 20      GFR est AA >60 >60 ml/min/1.73m2    GFR est non-AA >60 >60 ml/min/1.73m2    Calcium 9.7 8.5 - 10.1 MG/DL    Bilirubin, total 0.4 0.2 - 1.0 MG/DL    ALT (SGPT) 155 (H) 12 - 78 U/L    AST (SGOT) 112 (H) 15 - 37 U/L    Alk. phosphatase 104 45 - 117 U/L    Protein, total 8.8 (H) 6.4 - 8.2 g/dL    Albumin 4.4 3.5 - 5.0 g/dL    Globulin 4.4 (H) 2.0 - 4.0 g/dL    A-G Ratio 1.0 (L) 1.1 - 2.2     SAMPLES BEING HELD    Collection Time: 01/16/20  3:11 PM   Result Value Ref Range    SAMPLES BEING HELD 1RD,1BLU     COMMENT        Add-on orders for these samples will be processed based on acceptable specimen integrity and analyte stability, which may vary by analyte.    LIPASE    Collection Time: 01/16/20  3:11 PM   Result Value Ref Range    Lipase 378 73 - 393 U/L   MONONUCLEOSIS SCREEN    Collection Time: 01/16/20  3:11 PM   Result Value Ref Range Mononucleosis screen NEGATIVE  NEG          IMAGING:  No orders to display         Medications During Visit:  Medications   sodium chloride 0.9 % bolus infusion 1,000 mL (0 mL IntraVENous IV Completed 1/16/20 1925)   diphenhydrAMINE (BENADRYL) injection 25 mg (25 mg IntraVENous Given 1/16/20 1805)   dexamethasone (PF) (DECADRON) injection 10 mg (10 mg IntraVENous Given 1/16/20 1806)   prochlorperazine (COMPAZINE) with saline injection 10 mg (10 mg IntraVENous Given 1/16/20 1806)   ketorolac (TORADOL) injection 30 mg (30 mg IntraVENous Given 1/16/20 1806)         DECISION MAKING:  Lexi Thorne is a 30 y.o. male who comes in as above. Here, patient has she is had abdominal pain for several days. He is seen at [facility] yesterday. I did reach out to [facility] and spoke with 1 of the attendings there after consent for release of records. I compared our laboratory results that there is no is no remarkable difference. Patient CT results were discussed besides a mild urinary bladder from having to urinate, patient had no other acute abnormalities. Here, patient's headache is resolved after the medicines. He is found sleeping in the treatment area on multiple times when I went back to speak with him. Since report amount he started talking about how much pain he was in. He states that his pain was better but he was still having pain however his exam does not point towards anything specific as he has a negative Duggan's and negative McBurney's point. At this time I will discharge the patient home with outpatient follow-up as well as GI follow-up. I spoke to him about use of medicines. He states that he is provided with Zofran ODT last evening so that he would no longer be nauseous so he could take the medicine however patient states that he does not like taking it because it makes him want to throw up and that he wants something different. I advised that this is the only medicine I provide for nausea to go home with.   At this time the patient states \"I need a cab, I need a ride home\" which when I asked him if there was any other questions. Patient denies any other questions states that he will follow-up and return as needed. IMPRESSION:  1. Abdominal pain, generalized    2. Nonintractable headache, unspecified chronicity pattern, unspecified headache type    3. Transaminitis        DISPOSITION:  Discharged      Current Discharge Medication List           Follow-up Information     Follow up With Specialties Details Why Contact Info    Melissa Lopez MD [facility] Schedule an appointment as soon as possible for a visit   [address] [place] [address]      Sherrye Pallas., MD Gastroenterology Call  As needed [place] [phone] [phone]              The patient is asked to follow-up with their primary care provider in the next several days. They are to call tomorrow for an appointment. The patient is asked to return promptly for any increased concerns or worsening of symptoms. They can return to this emergency department or any other emergency department.     Procedures

## 2020-01-17 NOTE — ED NOTES
I have reviewed discharge instructions with the patient. The patient verbalized understanding. Ambualted out of the department with a steady gait in no acute distress. Patient called his mother to arrange Letha Pozo ride home.

## 2020-10-16 ENCOUNTER — APPOINTMENT (OUTPATIENT)
Dept: CT IMAGING | Age: 35
End: 2020-10-16
Attending: NURSE PRACTITIONER
Payer: MEDICAID

## 2020-10-16 ENCOUNTER — HOSPITAL ENCOUNTER (EMERGENCY)
Age: 35
Discharge: HOME | End: 2020-10-16
Attending: EMERGENCY MEDICINE
Payer: MEDICAID

## 2020-10-16 VITALS
DIASTOLIC BLOOD PRESSURE: 79 MMHG | RESPIRATION RATE: 16 BRPM | HEART RATE: 97 BPM | SYSTOLIC BLOOD PRESSURE: 176 MMHG | TEMPERATURE: 97.3 F | OXYGEN SATURATION: 97 %

## 2020-10-16 DIAGNOSIS — F41.1 ANXIETY STATE: Primary | ICD-10-CM

## 2020-10-16 DIAGNOSIS — Z76.5 MALINGERING: ICD-10-CM

## 2020-10-16 LAB
ALBUMIN SERPL-MCNC: 4.8 G/DL (ref 3.5–5)
ALBUMIN/GLOB SERPL: 1.1 {RATIO} (ref 1.1–2.2)
ALP SERPL-CCNC: 104 U/L (ref 45–117)
ALT SERPL-CCNC: 23 U/L (ref 12–78)
AMPHET UR QL SCN: NEGATIVE
ANION GAP SERPL CALC-SCNC: 8 MMOL/L (ref 5–15)
APPEARANCE UR: CLEAR
AST SERPL-CCNC: 38 U/L (ref 15–37)
BARBITURATES UR QL SCN: NEGATIVE
BASOPHILS # BLD: 0 K/UL (ref 0–0.1)
BASOPHILS NFR BLD: 0 % (ref 0–1)
BENZODIAZ UR QL: NEGATIVE
BILIRUB SERPL-MCNC: 0.4 MG/DL (ref 0.2–1)
BILIRUB UR QL: NEGATIVE
BUN SERPL-MCNC: 8 MG/DL (ref 6–20)
BUN/CREAT SERPL: 7 (ref 12–20)
CALCIUM SERPL-MCNC: 10.5 MG/DL (ref 8.5–10.1)
CANNABINOIDS UR QL SCN: POSITIVE
CHLORIDE SERPL-SCNC: 110 MMOL/L (ref 97–108)
CO2 SERPL-SCNC: 26 MMOL/L (ref 21–32)
COCAINE UR QL SCN: NEGATIVE
COLOR UR: NORMAL
CREAT SERPL-MCNC: 1.13 MG/DL (ref 0.7–1.3)
DIFFERENTIAL METHOD BLD: ABNORMAL
DRUG SCRN COMMENT,DRGCM: ABNORMAL
EOSINOPHIL # BLD: 0 K/UL (ref 0–0.4)
EOSINOPHIL NFR BLD: 0 % (ref 0–7)
ERYTHROCYTE [DISTWIDTH] IN BLOOD BY AUTOMATED COUNT: 21 % (ref 11.5–14.5)
GLOBULIN SER CALC-MCNC: 4.4 G/DL (ref 2–4)
GLUCOSE SERPL-MCNC: 116 MG/DL (ref 65–100)
GLUCOSE UR STRIP.AUTO-MCNC: NEGATIVE MG/DL
HCT VFR BLD AUTO: 40.5 % (ref 36.6–50.3)
HGB BLD-MCNC: 12.7 G/DL (ref 12.1–17)
HGB UR QL STRIP: NEGATIVE
IMM GRANULOCYTES # BLD AUTO: 0 K/UL (ref 0–0.04)
IMM GRANULOCYTES NFR BLD AUTO: 0 % (ref 0–0.5)
KETONES UR QL STRIP.AUTO: NEGATIVE MG/DL
LEUKOCYTE ESTERASE UR QL STRIP.AUTO: NEGATIVE
LIPASE SERPL-CCNC: 374 U/L (ref 73–393)
LYMPHOCYTES # BLD: 1 K/UL (ref 0.8–3.5)
LYMPHOCYTES NFR BLD: 14 % (ref 12–49)
MCH RBC QN AUTO: 24.7 PG (ref 26–34)
MCHC RBC AUTO-ENTMCNC: 31.4 G/DL (ref 30–36.5)
MCV RBC AUTO: 78.6 FL (ref 80–99)
METHADONE UR QL: NEGATIVE
MONOCYTES # BLD: 0.5 K/UL (ref 0–1)
MONOCYTES NFR BLD: 7 % (ref 5–13)
NEUTS SEG # BLD: 5.4 K/UL (ref 1.8–8)
NEUTS SEG NFR BLD: 79 % (ref 32–75)
NITRITE UR QL STRIP.AUTO: NEGATIVE
NRBC # BLD: 0 K/UL (ref 0–0.01)
NRBC BLD-RTO: 0 PER 100 WBC
OPIATES UR QL: NEGATIVE
PCP UR QL: NEGATIVE
PH UR STRIP: 7 [PH] (ref 5–8)
PLATELET # BLD AUTO: 396 K/UL (ref 150–400)
POTASSIUM SERPL-SCNC: 4.4 MMOL/L (ref 3.5–5.1)
PROT SERPL-MCNC: 9.2 G/DL (ref 6.4–8.2)
PROT UR STRIP-MCNC: NEGATIVE MG/DL
RBC # BLD AUTO: 5.15 M/UL (ref 4.1–5.7)
RBC MORPH BLD: ABNORMAL
SODIUM SERPL-SCNC: 144 MMOL/L (ref 136–145)
SP GR UR REFRACTOMETRY: <1.005 (ref 1–1.03)
UROBILINOGEN UR QL STRIP.AUTO: 0.2 EU/DL (ref 0.2–1)
WBC # BLD AUTO: 6.9 K/UL (ref 4.1–11.1)

## 2020-10-16 PROCEDURE — 74011000636 HC RX REV CODE- 636: Performed by: RADIOLOGY

## 2020-10-16 PROCEDURE — 96374 THER/PROPH/DIAG INJ IV PUSH: CPT

## 2020-10-16 PROCEDURE — 74177 CT ABD & PELVIS W/CONTRAST: CPT

## 2020-10-16 PROCEDURE — 80307 DRUG TEST PRSMV CHEM ANLYZR: CPT

## 2020-10-16 PROCEDURE — 96375 TX/PRO/DX INJ NEW DRUG ADDON: CPT

## 2020-10-16 PROCEDURE — 80053 COMPREHEN METABOLIC PANEL: CPT

## 2020-10-16 PROCEDURE — 74011250636 HC RX REV CODE- 250/636: Performed by: NURSE PRACTITIONER

## 2020-10-16 PROCEDURE — 83690 ASSAY OF LIPASE: CPT

## 2020-10-16 PROCEDURE — 81003 URINALYSIS AUTO W/O SCOPE: CPT

## 2020-10-16 PROCEDURE — 2709999900 HC NON-CHARGEABLE SUPPLY

## 2020-10-16 PROCEDURE — 85025 COMPLETE CBC W/AUTO DIFF WBC: CPT

## 2020-10-16 PROCEDURE — 96372 THER/PROPH/DIAG INJ SC/IM: CPT

## 2020-10-16 PROCEDURE — 99283 EMERGENCY DEPT VISIT LOW MDM: CPT

## 2020-10-16 PROCEDURE — 36415 COLL VENOUS BLD VENIPUNCTURE: CPT

## 2020-10-16 PROCEDURE — 74011000258 HC RX REV CODE- 258: Performed by: RADIOLOGY

## 2020-10-16 RX ORDER — ONDANSETRON 4 MG/1
4 TABLET, ORALLY DISINTEGRATING ORAL
Qty: 30 TAB | Refills: 0 | Status: SHIPPED | OUTPATIENT
Start: 2020-10-16

## 2020-10-16 RX ORDER — ONDANSETRON 2 MG/ML
4 INJECTION INTRAMUSCULAR; INTRAVENOUS
Status: COMPLETED | OUTPATIENT
Start: 2020-10-16 | End: 2020-10-16

## 2020-10-16 RX ORDER — DICYCLOMINE HYDROCHLORIDE 10 MG/1
10 CAPSULE ORAL 4 TIMES DAILY
Qty: 20 CAP | Refills: 0 | Status: SHIPPED | OUTPATIENT
Start: 2020-10-16 | End: 2020-10-21

## 2020-10-16 RX ORDER — SODIUM CHLORIDE 0.9 % (FLUSH) 0.9 %
10 SYRINGE (ML) INJECTION
Status: COMPLETED | OUTPATIENT
Start: 2020-10-16 | End: 2020-10-16

## 2020-10-16 RX ORDER — HALOPERIDOL 5 MG/ML
5 INJECTION INTRAMUSCULAR
Status: COMPLETED | OUTPATIENT
Start: 2020-10-16 | End: 2020-10-16

## 2020-10-16 RX ORDER — DICYCLOMINE HYDROCHLORIDE 10 MG/ML
20 INJECTION INTRAMUSCULAR
Status: COMPLETED | OUTPATIENT
Start: 2020-10-16 | End: 2020-10-16

## 2020-10-16 RX ORDER — MORPHINE SULFATE 4 MG/ML
3 INJECTION INTRAVENOUS ONCE
Status: DISCONTINUED | OUTPATIENT
Start: 2020-10-16 | End: 2020-10-16

## 2020-10-16 RX ADMIN — HALOPERIDOL LACTATE 5 MG: 5 INJECTION, SOLUTION INTRAMUSCULAR at 11:01

## 2020-10-16 RX ADMIN — IOPAMIDOL 100 ML: 755 INJECTION, SOLUTION INTRAVENOUS at 13:16

## 2020-10-16 RX ADMIN — DICYCLOMINE HYDROCHLORIDE 20 MG: 10 INJECTION INTRAMUSCULAR at 12:35

## 2020-10-16 RX ADMIN — ONDANSETRON 4 MG: 2 INJECTION INTRAMUSCULAR; INTRAVENOUS at 12:35

## 2020-10-16 RX ADMIN — SODIUM CHLORIDE 100 ML: 900 INJECTION, SOLUTION INTRAVENOUS at 13:16

## 2020-10-16 RX ADMIN — Medication 10 ML: at 13:16

## 2020-10-16 RX ADMIN — SODIUM CHLORIDE 1000 ML: 9 INJECTION, SOLUTION INTRAVENOUS at 10:21

## 2020-10-16 NOTE — DISCHARGE INSTRUCTIONS
Please the list of Pain Management Specialist below for your convenience:    List of Pain Management Specialists:    Mich Nolan M.D. [phone] Pain Management  Jj Leahy M.D. [phone] Physical Medicine and Carlos Draper M.D. [phone] Physical Medicine and Niurka Cali M.D. [phone] Pain Management  Nalini Rain M.D. [phone] Pain Management    Dr. Brennon Trevizo, [address]                                         15[address] [facility], [address] [facility], [address]  [phone]    Pain Management Center                            MARGA Lanza MD DO  [address]                               [address] [facility], [address]  [phone]    Dr. Nikki Cheema                                   [address]                                     [place] [phone]                                                                            Dr. Nasrin Caballero, [facility] [address] [facility]  1540 [facility]                                           [address] [facility], [facility]9 [facility], [address] [place] [address] [address]  www. [employer]                                            [phone]  [phone] Dr. Rosenda Lopez, [address]  [place], [facility] [address]       [phone]              Dr. Lilly Pena  [facility], [address] [place] [address]  21       Dr. Teo Handley  [address] [place] [address]. [address] [place] [address], [address] [facility], [address] [place] [address]  [phone]  [phone]   [address] [facility]  7785 [facility], [address] [facility]  [phone]    Pain Management, Physical Medicine & Rehabilitation  Dr. Luong Covert Dr Roverto Hou, [address] [place] [address]    Pain Management  Dr. Edie Monroe  12[address] [facility], 40 [facility]    Physical Medicine & Rehabilitation, Pain Management  Juarez Jones MD  [address] [facility], [address] [place] [address]    Angela Bender, DO, PMR  [address] [address]ila Gameza, [address] [place] [address]  [phone]      Please also consider natural alternatives to pain relief such as physical therapy, massage therapy, acupuncture, chiropractors, reflexology, and trigger point injections. [employer] [facility] [employer]  [email or web address]

## 2020-10-16 NOTE — ED TRIAGE NOTES
Triage: Pt arrives from home via EMS with CC of abdominal pain x3 days. Seen at [facility] yesterday and told to follow up with GI. Pt endorses nausea and vomiting. Per EMS patient has run out of his ativan and pain medications.
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
## 2021-05-13 ENCOUNTER — HOSPITAL ENCOUNTER (OUTPATIENT)
Age: 36
Setting detail: OBSERVATION
Discharge: HOME | End: 2021-05-15
Attending: EMERGENCY MEDICINE | Admitting: INTERNAL MEDICINE
Payer: MEDICAID

## 2021-05-13 ENCOUNTER — APPOINTMENT (OUTPATIENT)
Dept: CT IMAGING | Age: 36
End: 2021-05-13
Attending: EMERGENCY MEDICINE
Payer: MEDICAID

## 2021-05-13 DIAGNOSIS — R10.84 ABDOMINAL PAIN, GENERALIZED: ICD-10-CM

## 2021-05-13 DIAGNOSIS — R11.2 INTRACTABLE VOMITING WITH NAUSEA, UNSPECIFIED VOMITING TYPE: Primary | ICD-10-CM

## 2021-05-13 LAB
ALBUMIN SERPL-MCNC: 4.7 G/DL (ref 3.5–5)
ALBUMIN/GLOB SERPL: 1.1 {RATIO} (ref 1.1–2.2)
ALP SERPL-CCNC: 113 U/L (ref 45–117)
ALT SERPL-CCNC: 35 U/L (ref 12–78)
ANION GAP SERPL CALC-SCNC: 6 MMOL/L (ref 5–15)
APPEARANCE UR: CLEAR
AST SERPL-CCNC: 35 U/L (ref 15–37)
BACTERIA URNS QL MICRO: NEGATIVE /HPF
BASOPHILS # BLD: 0 K/UL (ref 0–0.1)
BASOPHILS NFR BLD: 0 % (ref 0–1)
BILIRUB SERPL-MCNC: 0.4 MG/DL (ref 0.2–1)
BILIRUB UR QL: NEGATIVE
BUN SERPL-MCNC: 8 MG/DL (ref 6–20)
BUN/CREAT SERPL: 7 (ref 12–20)
CALCIUM SERPL-MCNC: 10.5 MG/DL (ref 8.5–10.1)
CHLORIDE SERPL-SCNC: 108 MMOL/L (ref 97–108)
CO2 SERPL-SCNC: 27 MMOL/L (ref 21–32)
COLOR UR: ABNORMAL
CREAT SERPL-MCNC: 1.12 MG/DL (ref 0.7–1.3)
DIFFERENTIAL METHOD BLD: ABNORMAL
EOSINOPHIL # BLD: 0 K/UL (ref 0–0.4)
EOSINOPHIL NFR BLD: 0 % (ref 0–7)
EPITH CASTS URNS QL MICRO: ABNORMAL /LPF
ERYTHROCYTE [DISTWIDTH] IN BLOOD BY AUTOMATED COUNT: 16.6 % (ref 11.5–14.5)
GLOBULIN SER CALC-MCNC: 4.4 G/DL (ref 2–4)
GLUCOSE BLD STRIP.AUTO-MCNC: 119 MG/DL (ref 65–117)
GLUCOSE SERPL-MCNC: 122 MG/DL (ref 65–100)
GLUCOSE UR STRIP.AUTO-MCNC: NEGATIVE MG/DL
HCT VFR BLD AUTO: 48.1 % (ref 36.6–50.3)
HGB BLD-MCNC: 16 G/DL (ref 12.1–17)
HGB UR QL STRIP: NEGATIVE
HYALINE CASTS URNS QL MICRO: ABNORMAL /LPF (ref 0–5)
IMM GRANULOCYTES # BLD AUTO: 0 K/UL (ref 0–0.04)
IMM GRANULOCYTES NFR BLD AUTO: 0 % (ref 0–0.5)
KETONES UR QL STRIP.AUTO: NEGATIVE MG/DL
LEUKOCYTE ESTERASE UR QL STRIP.AUTO: NEGATIVE
LIPASE SERPL-CCNC: 160 U/L (ref 73–393)
LYMPHOCYTES # BLD: 0.6 K/UL (ref 0.8–3.5)
LYMPHOCYTES NFR BLD: 9 % (ref 12–49)
MCH RBC QN AUTO: 29.7 PG (ref 26–34)
MCHC RBC AUTO-ENTMCNC: 33.3 G/DL (ref 30–36.5)
MCV RBC AUTO: 89.2 FL (ref 80–99)
MONOCYTES # BLD: 0.2 K/UL (ref 0–1)
MONOCYTES NFR BLD: 3 % (ref 5–13)
NEUTS SEG # BLD: 5.9 K/UL (ref 1.8–8)
NEUTS SEG NFR BLD: 88 % (ref 32–75)
NITRITE UR QL STRIP.AUTO: NEGATIVE
NRBC # BLD: 0 K/UL (ref 0–0.01)
NRBC BLD-RTO: 0 PER 100 WBC
PH UR STRIP: 8.5 [PH] (ref 5–8)
PLATELET # BLD AUTO: 362 K/UL (ref 150–400)
PMV BLD AUTO: 9.9 FL (ref 8.9–12.9)
POTASSIUM SERPL-SCNC: 5 MMOL/L (ref 3.5–5.1)
PROT SERPL-MCNC: 9.1 G/DL (ref 6.4–8.2)
PROT UR STRIP-MCNC: NEGATIVE MG/DL
RBC # BLD AUTO: 5.39 M/UL (ref 4.1–5.7)
RBC #/AREA URNS HPF: ABNORMAL /HPF (ref 0–5)
RBC MORPH BLD: ABNORMAL
RBC MORPH BLD: ABNORMAL
SERVICE CMNT-IMP: ABNORMAL
SODIUM SERPL-SCNC: 141 MMOL/L (ref 136–145)
SP GR UR REFRACTOMETRY: 1.01 (ref 1–1.03)
UR CULT HOLD, URHOLD: NORMAL
UROBILINOGEN UR QL STRIP.AUTO: 0.2 EU/DL (ref 0.2–1)
WBC # BLD AUTO: 6.7 K/UL (ref 4.1–11.1)
WBC URNS QL MICRO: ABNORMAL /HPF (ref 0–4)

## 2021-05-13 PROCEDURE — 74177 CT ABD & PELVIS W/CONTRAST: CPT

## 2021-05-13 PROCEDURE — 83690 ASSAY OF LIPASE: CPT

## 2021-05-13 PROCEDURE — 74011000636 HC RX REV CODE- 636: Performed by: EMERGENCY MEDICINE

## 2021-05-13 PROCEDURE — 80307 DRUG TEST PRSMV CHEM ANLYZR: CPT

## 2021-05-13 PROCEDURE — 74011250636 HC RX REV CODE- 250/636: Performed by: EMERGENCY MEDICINE

## 2021-05-13 PROCEDURE — 74011250636 HC RX REV CODE- 250/636: Performed by: INTERNAL MEDICINE

## 2021-05-13 PROCEDURE — 99285 EMERGENCY DEPT VISIT HI MDM: CPT

## 2021-05-13 PROCEDURE — 99218 HC RM OBSERVATION: CPT

## 2021-05-13 PROCEDURE — 85025 COMPLETE CBC W/AUTO DIFF WBC: CPT

## 2021-05-13 PROCEDURE — 82962 GLUCOSE BLOOD TEST: CPT

## 2021-05-13 PROCEDURE — 74011000250 HC RX REV CODE- 250: Performed by: EMERGENCY MEDICINE

## 2021-05-13 PROCEDURE — 96374 THER/PROPH/DIAG INJ IV PUSH: CPT

## 2021-05-13 PROCEDURE — 80053 COMPREHEN METABOLIC PANEL: CPT

## 2021-05-13 PROCEDURE — 81001 URINALYSIS AUTO W/SCOPE: CPT

## 2021-05-13 PROCEDURE — 36415 COLL VENOUS BLD VENIPUNCTURE: CPT

## 2021-05-13 PROCEDURE — 96375 TX/PRO/DX INJ NEW DRUG ADDON: CPT

## 2021-05-13 RX ORDER — HYDRALAZINE HYDROCHLORIDE 20 MG/ML
10 INJECTION INTRAMUSCULAR; INTRAVENOUS
Status: DISCONTINUED | OUTPATIENT
Start: 2021-05-13 | End: 2021-05-15 | Stop reason: HOSPADM

## 2021-05-13 RX ORDER — KETOROLAC TROMETHAMINE 30 MG/ML
15 INJECTION, SOLUTION INTRAMUSCULAR; INTRAVENOUS
Status: COMPLETED | OUTPATIENT
Start: 2021-05-13 | End: 2021-05-13

## 2021-05-13 RX ORDER — KETOROLAC TROMETHAMINE 30 MG/ML
15 INJECTION, SOLUTION INTRAMUSCULAR; INTRAVENOUS
Status: DISCONTINUED | OUTPATIENT
Start: 2021-05-13 | End: 2021-05-15 | Stop reason: HOSPADM

## 2021-05-13 RX ORDER — DIPHENHYDRAMINE HYDROCHLORIDE 50 MG/ML
25 INJECTION, SOLUTION INTRAMUSCULAR; INTRAVENOUS
Status: COMPLETED | OUTPATIENT
Start: 2021-05-13 | End: 2021-05-13

## 2021-05-13 RX ORDER — ONDANSETRON 2 MG/ML
4 INJECTION INTRAMUSCULAR; INTRAVENOUS ONCE
Status: COMPLETED | OUTPATIENT
Start: 2021-05-13 | End: 2021-05-13

## 2021-05-13 RX ADMIN — DIPHENHYDRAMINE HYDROCHLORIDE 25 MG: 50 INJECTION INTRAMUSCULAR; INTRAVENOUS at 18:39

## 2021-05-13 RX ADMIN — HYDRALAZINE HYDROCHLORIDE 10 MG: 20 INJECTION INTRAMUSCULAR; INTRAVENOUS at 23:03

## 2021-05-13 RX ADMIN — IOPAMIDOL 100 ML: 755 INJECTION, SOLUTION INTRAVENOUS at 19:18

## 2021-05-13 RX ADMIN — SODIUM CHLORIDE 10 MG: 9 INJECTION INTRAMUSCULAR; INTRAVENOUS; SUBCUTANEOUS at 18:47

## 2021-05-13 RX ADMIN — KETOROLAC TROMETHAMINE 15 MG: 30 INJECTION, SOLUTION INTRAMUSCULAR; INTRAVENOUS at 16:30

## 2021-05-13 RX ADMIN — KETOROLAC TROMETHAMINE 15 MG: 30 INJECTION, SOLUTION INTRAMUSCULAR; INTRAVENOUS at 20:15

## 2021-05-13 RX ADMIN — ONDANSETRON 4 MG: 2 INJECTION INTRAMUSCULAR; INTRAVENOUS at 16:29
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
## 2021-05-14 ENCOUNTER — APPOINTMENT (OUTPATIENT)
Dept: GENERAL RADIOLOGY | Age: 36
End: 2021-05-14
Attending: INTERNAL MEDICINE
Payer: MEDICAID

## 2021-05-14 ENCOUNTER — APPOINTMENT (OUTPATIENT)
Dept: CT IMAGING | Age: 36
End: 2021-05-14
Attending: INTERNAL MEDICINE
Payer: MEDICAID

## 2021-05-14 LAB
ALBUMIN SERPL-MCNC: 4.6 G/DL (ref 3.5–5)
ALBUMIN/GLOB SERPL: 1.1 {RATIO} (ref 1.1–2.2)
ALP SERPL-CCNC: 105 U/L (ref 45–117)
ALT SERPL-CCNC: 34 U/L (ref 12–78)
AMPHET UR QL SCN: NEGATIVE
ANION GAP SERPL CALC-SCNC: 9 MMOL/L (ref 5–15)
AST SERPL-CCNC: 15 U/L (ref 15–37)
ATRIAL RATE: 72 BPM
BARBITURATES UR QL SCN: NEGATIVE
BASOPHILS # BLD: 0 K/UL (ref 0–0.1)
BASOPHILS NFR BLD: 0 % (ref 0–1)
BENZODIAZ UR QL: NEGATIVE
BILIRUB SERPL-MCNC: 0.3 MG/DL (ref 0.2–1)
BUN SERPL-MCNC: 10 MG/DL (ref 6–20)
BUN/CREAT SERPL: 9 (ref 12–20)
CALCIUM SERPL-MCNC: 10.4 MG/DL (ref 8.5–10.1)
CALCULATED P AXIS, ECG09: 60 DEGREES
CALCULATED R AXIS, ECG10: 50 DEGREES
CALCULATED T AXIS, ECG11: 43 DEGREES
CANNABINOIDS UR QL SCN: POSITIVE
CHLORIDE SERPL-SCNC: 111 MMOL/L (ref 97–108)
CO2 SERPL-SCNC: 25 MMOL/L (ref 21–32)
COCAINE UR QL SCN: NEGATIVE
CREAT SERPL-MCNC: 1.15 MG/DL (ref 0.7–1.3)
DIAGNOSIS, 93000: NORMAL
DIFFERENTIAL METHOD BLD: ABNORMAL
DRUG SCRN COMMENT,DRGCM: ABNORMAL
EOSINOPHIL # BLD: 0 K/UL (ref 0–0.4)
EOSINOPHIL NFR BLD: 0 % (ref 0–7)
ERYTHROCYTE [DISTWIDTH] IN BLOOD BY AUTOMATED COUNT: 16.7 % (ref 11.5–14.5)
GLOBULIN SER CALC-MCNC: 4.3 G/DL (ref 2–4)
GLUCOSE SERPL-MCNC: 119 MG/DL (ref 65–100)
HCT VFR BLD AUTO: 46.7 % (ref 36.6–50.3)
HGB BLD-MCNC: 15.8 G/DL (ref 12.1–17)
IMM GRANULOCYTES # BLD AUTO: 0 K/UL (ref 0–0.04)
IMM GRANULOCYTES NFR BLD AUTO: 0 % (ref 0–0.5)
LIPASE SERPL-CCNC: 541 U/L (ref 73–393)
LYMPHOCYTES # BLD: 1.1 K/UL (ref 0.8–3.5)
LYMPHOCYTES NFR BLD: 11 % (ref 12–49)
MAGNESIUM SERPL-MCNC: 2.3 MG/DL (ref 1.6–2.4)
MCH RBC QN AUTO: 29.9 PG (ref 26–34)
MCHC RBC AUTO-ENTMCNC: 33.8 G/DL (ref 30–36.5)
MCV RBC AUTO: 88.3 FL (ref 80–99)
METHADONE UR QL: NEGATIVE
MONOCYTES # BLD: 1 K/UL (ref 0–1)
MONOCYTES NFR BLD: 11 % (ref 5–13)
NEUTS SEG # BLD: 7.6 K/UL (ref 1.8–8)
NEUTS SEG NFR BLD: 78 % (ref 32–75)
NRBC # BLD: 0 K/UL (ref 0–0.01)
NRBC BLD-RTO: 0 PER 100 WBC
OPIATES UR QL: NEGATIVE
P-R INTERVAL, ECG05: 144 MS
PCP UR QL: NEGATIVE
PHOSPHATE SERPL-MCNC: 2.8 MG/DL (ref 2.6–4.7)
PLATELET # BLD AUTO: 345 K/UL (ref 150–400)
PMV BLD AUTO: 9.6 FL (ref 8.9–12.9)
POTASSIUM SERPL-SCNC: 3.7 MMOL/L (ref 3.5–5.1)
PROT SERPL-MCNC: 8.9 G/DL (ref 6.4–8.2)
Q-T INTERVAL, ECG07: 366 MS
QRS DURATION, ECG06: 96 MS
QTC CALCULATION (BEZET), ECG08: 400 MS
RBC # BLD AUTO: 5.29 M/UL (ref 4.1–5.7)
SODIUM SERPL-SCNC: 145 MMOL/L (ref 136–145)
TROPONIN I SERPL-MCNC: <0.05 NG/ML
TSH SERPL DL<=0.05 MIU/L-ACNC: 2.08 UIU/ML (ref 0.36–3.74)
VENTRICULAR RATE, ECG03: 72 BPM
WBC # BLD AUTO: 9.7 K/UL (ref 4.1–11.1)

## 2021-05-14 PROCEDURE — 84484 ASSAY OF TROPONIN QUANT: CPT

## 2021-05-14 PROCEDURE — 84100 ASSAY OF PHOSPHORUS: CPT

## 2021-05-14 PROCEDURE — 74011000250 HC RX REV CODE- 250: Performed by: INTERNAL MEDICINE

## 2021-05-14 PROCEDURE — 96375 TX/PRO/DX INJ NEW DRUG ADDON: CPT

## 2021-05-14 PROCEDURE — 84443 ASSAY THYROID STIM HORMONE: CPT

## 2021-05-14 PROCEDURE — 74011250637 HC RX REV CODE- 250/637: Performed by: INTERNAL MEDICINE

## 2021-05-14 PROCEDURE — 71045 X-RAY EXAM CHEST 1 VIEW: CPT

## 2021-05-14 PROCEDURE — 93005 ELECTROCARDIOGRAM TRACING: CPT

## 2021-05-14 PROCEDURE — 96372 THER/PROPH/DIAG INJ SC/IM: CPT

## 2021-05-14 PROCEDURE — 36415 COLL VENOUS BLD VENIPUNCTURE: CPT

## 2021-05-14 PROCEDURE — 96376 TX/PRO/DX INJ SAME DRUG ADON: CPT

## 2021-05-14 PROCEDURE — 74011250637 HC RX REV CODE- 250/637: Performed by: PHYSICIAN ASSISTANT

## 2021-05-14 PROCEDURE — 83735 ASSAY OF MAGNESIUM: CPT

## 2021-05-14 PROCEDURE — 85025 COMPLETE CBC W/AUTO DIFF WBC: CPT

## 2021-05-14 PROCEDURE — C9113 INJ PANTOPRAZOLE SODIUM, VIA: HCPCS | Performed by: INTERNAL MEDICINE

## 2021-05-14 PROCEDURE — 83690 ASSAY OF LIPASE: CPT

## 2021-05-14 PROCEDURE — 74011250637 HC RX REV CODE- 250/637: Performed by: HOSPITALIST

## 2021-05-14 PROCEDURE — 99218 HC RM OBSERVATION: CPT

## 2021-05-14 PROCEDURE — 74011250636 HC RX REV CODE- 250/636: Performed by: INTERNAL MEDICINE

## 2021-05-14 PROCEDURE — 70450 CT HEAD/BRAIN W/O DYE: CPT

## 2021-05-14 PROCEDURE — 80053 COMPREHEN METABOLIC PANEL: CPT

## 2021-05-14 RX ORDER — HYDROMORPHONE HYDROCHLORIDE 1 MG/ML
1 INJECTION, SOLUTION INTRAMUSCULAR; INTRAVENOUS; SUBCUTANEOUS
Status: DISCONTINUED | OUTPATIENT
Start: 2021-05-14 | End: 2021-05-15 | Stop reason: HOSPADM

## 2021-05-14 RX ORDER — IPRATROPIUM BROMIDE AND ALBUTEROL SULFATE 2.5; .5 MG/3ML; MG/3ML
3 SOLUTION RESPIRATORY (INHALATION)
Status: DISCONTINUED | OUTPATIENT
Start: 2021-05-14 | End: 2021-05-15 | Stop reason: HOSPADM

## 2021-05-14 RX ORDER — METOPROLOL SUCCINATE 50 MG/1
100 TABLET, EXTENDED RELEASE ORAL DAILY
Status: DISCONTINUED | OUTPATIENT
Start: 2021-05-14 | End: 2021-05-14

## 2021-05-14 RX ORDER — SERTRALINE HYDROCHLORIDE 50 MG/1
50 TABLET, FILM COATED ORAL DAILY
Status: DISCONTINUED | OUTPATIENT
Start: 2021-05-14 | End: 2021-05-15 | Stop reason: HOSPADM

## 2021-05-14 RX ORDER — PROMETHAZINE HYDROCHLORIDE 25 MG/1
12.5 TABLET ORAL
Status: DISCONTINUED | OUTPATIENT
Start: 2021-05-14 | End: 2021-05-15 | Stop reason: HOSPADM

## 2021-05-14 RX ORDER — DICYCLOMINE HYDROCHLORIDE 10 MG/1
20 CAPSULE ORAL 3 TIMES DAILY
Status: DISCONTINUED | OUTPATIENT
Start: 2021-05-14 | End: 2021-05-15 | Stop reason: HOSPADM

## 2021-05-14 RX ORDER — MAGNESIUM CITRATE
296 SOLUTION, ORAL ORAL
Status: COMPLETED | OUTPATIENT
Start: 2021-05-14 | End: 2021-05-14

## 2021-05-14 RX ORDER — SODIUM CHLORIDE, SODIUM LACTATE, POTASSIUM CHLORIDE, CALCIUM CHLORIDE 600; 310; 30; 20 MG/100ML; MG/100ML; MG/100ML; MG/100ML
75 INJECTION, SOLUTION INTRAVENOUS CONTINUOUS
Status: DISCONTINUED | OUTPATIENT
Start: 2021-05-14 | End: 2021-05-15 | Stop reason: HOSPADM

## 2021-05-14 RX ORDER — POLYETHYLENE GLYCOL 3350 17 G/17G
17 POWDER, FOR SOLUTION ORAL DAILY PRN
Status: DISCONTINUED | OUTPATIENT
Start: 2021-05-14 | End: 2021-05-15 | Stop reason: HOSPADM

## 2021-05-14 RX ORDER — AMLODIPINE BESYLATE 5 MG/1
10 TABLET ORAL DAILY
Status: DISCONTINUED | OUTPATIENT
Start: 2021-05-14 | End: 2021-05-14

## 2021-05-14 RX ORDER — SODIUM CHLORIDE 0.9 % (FLUSH) 0.9 %
5-40 SYRINGE (ML) INJECTION EVERY 8 HOURS
Status: DISCONTINUED | OUTPATIENT
Start: 2021-05-14 | End: 2021-05-15 | Stop reason: HOSPADM

## 2021-05-14 RX ORDER — IBUPROFEN 200 MG
1 TABLET ORAL DAILY
Status: DISCONTINUED | OUTPATIENT
Start: 2021-05-14 | End: 2021-05-15 | Stop reason: HOSPADM

## 2021-05-14 RX ORDER — AMLODIPINE BESYLATE 5 MG/1
10 TABLET ORAL DAILY
Status: DISCONTINUED | OUTPATIENT
Start: 2021-05-14 | End: 2021-05-15 | Stop reason: HOSPADM

## 2021-05-14 RX ORDER — SERTRALINE HYDROCHLORIDE 50 MG/1
50 TABLET, FILM COATED ORAL DAILY
Status: DISCONTINUED | OUTPATIENT
Start: 2021-05-14 | End: 2021-05-14

## 2021-05-14 RX ORDER — METOPROLOL SUCCINATE 50 MG/1
100 TABLET, EXTENDED RELEASE ORAL DAILY
Status: DISCONTINUED | OUTPATIENT
Start: 2021-05-14 | End: 2021-05-15 | Stop reason: HOSPADM

## 2021-05-14 RX ORDER — SODIUM CHLORIDE 0.9 % (FLUSH) 0.9 %
5-40 SYRINGE (ML) INJECTION AS NEEDED
Status: DISCONTINUED | OUTPATIENT
Start: 2021-05-14 | End: 2021-05-15 | Stop reason: HOSPADM

## 2021-05-14 RX ORDER — HEPARIN SODIUM 5000 [USP'U]/ML
5000 INJECTION, SOLUTION INTRAVENOUS; SUBCUTANEOUS EVERY 8 HOURS
Status: DISCONTINUED | OUTPATIENT
Start: 2021-05-14 | End: 2021-05-15 | Stop reason: HOSPADM

## 2021-05-14 RX ORDER — ACETAMINOPHEN 650 MG/1
650 SUPPOSITORY RECTAL
Status: DISCONTINUED | OUTPATIENT
Start: 2021-05-14 | End: 2021-05-15 | Stop reason: HOSPADM

## 2021-05-14 RX ORDER — ONDANSETRON 2 MG/ML
4 INJECTION INTRAMUSCULAR; INTRAVENOUS
Status: DISCONTINUED | OUTPATIENT
Start: 2021-05-14 | End: 2021-05-15 | Stop reason: HOSPADM

## 2021-05-14 RX ORDER — ACETAMINOPHEN 325 MG/1
650 TABLET ORAL
Status: DISCONTINUED | OUTPATIENT
Start: 2021-05-14 | End: 2021-05-15 | Stop reason: HOSPADM

## 2021-05-14 RX ORDER — TRAMADOL HYDROCHLORIDE 50 MG/1
50 TABLET ORAL
Status: DISCONTINUED | OUTPATIENT
Start: 2021-05-14 | End: 2021-05-15 | Stop reason: HOSPADM

## 2021-05-14 RX ADMIN — HYDROMORPHONE HYDROCHLORIDE 1 MG: 1 INJECTION, SOLUTION INTRAMUSCULAR; INTRAVENOUS; SUBCUTANEOUS at 09:42

## 2021-05-14 RX ADMIN — MAGNESIUM CITRATE 150 ML: 1.75 LIQUID ORAL at 13:15

## 2021-05-14 RX ADMIN — AMLODIPINE BESYLATE 10 MG: 5 TABLET ORAL at 01:59

## 2021-05-14 RX ADMIN — HEPARIN SODIUM 5000 UNITS: 5000 INJECTION INTRAVENOUS; SUBCUTANEOUS at 06:00

## 2021-05-14 RX ADMIN — ONDANSETRON 4 MG: 2 INJECTION INTRAMUSCULAR; INTRAVENOUS at 04:33

## 2021-05-14 RX ADMIN — DICYCLOMINE HYDROCHLORIDE 20 MG: 10 CAPSULE ORAL at 22:17

## 2021-05-14 RX ADMIN — HYDROMORPHONE HYDROCHLORIDE 1 MG: 1 INJECTION, SOLUTION INTRAMUSCULAR; INTRAVENOUS; SUBCUTANEOUS at 22:12

## 2021-05-14 RX ADMIN — HYDROMORPHONE HYDROCHLORIDE 1 MG: 1 INJECTION, SOLUTION INTRAMUSCULAR; INTRAVENOUS; SUBCUTANEOUS at 17:27

## 2021-05-14 RX ADMIN — SERTRALINE 50 MG: 50 TABLET, FILM COATED ORAL at 01:59

## 2021-05-14 RX ADMIN — HYDRALAZINE HYDROCHLORIDE 10 MG: 20 INJECTION INTRAMUSCULAR; INTRAVENOUS at 14:16

## 2021-05-14 RX ADMIN — HYDROMORPHONE HYDROCHLORIDE 1 MG: 1 INJECTION, SOLUTION INTRAMUSCULAR; INTRAVENOUS; SUBCUTANEOUS at 13:37

## 2021-05-14 RX ADMIN — METOPROLOL SUCCINATE 100 MG: 50 TABLET, EXTENDED RELEASE ORAL at 01:58

## 2021-05-14 RX ADMIN — SODIUM CHLORIDE, POTASSIUM CHLORIDE, SODIUM LACTATE AND CALCIUM CHLORIDE 75 ML/HR: 600; 310; 30; 20 INJECTION, SOLUTION INTRAVENOUS at 01:58

## 2021-05-14 RX ADMIN — HYDROMORPHONE HYDROCHLORIDE 0.5 MG: 1 INJECTION, SOLUTION INTRAMUSCULAR; INTRAVENOUS; SUBCUTANEOUS at 01:59

## 2021-05-14 RX ADMIN — SODIUM CHLORIDE 40 MG: 9 INJECTION INTRAMUSCULAR; INTRAVENOUS; SUBCUTANEOUS at 01:59

## 2021-05-14 RX ADMIN — HYDRALAZINE HYDROCHLORIDE 10 MG: 20 INJECTION INTRAMUSCULAR; INTRAVENOUS at 22:27

## 2021-05-14 RX ADMIN — Medication 10 ML: at 13:39

## 2021-05-14 RX ADMIN — KETOROLAC TROMETHAMINE 15 MG: 30 INJECTION, SOLUTION INTRAMUSCULAR; INTRAVENOUS at 00:39

## 2021-05-14 RX ADMIN — HYDROMORPHONE HYDROCHLORIDE 1 MG: 1 INJECTION, SOLUTION INTRAMUSCULAR; INTRAVENOUS; SUBCUTANEOUS at 06:00

## 2021-05-14 RX ADMIN — DICYCLOMINE HYDROCHLORIDE 20 MG: 10 CAPSULE ORAL at 14:16

## 2021-05-14 RX ADMIN — KETOROLAC TROMETHAMINE 15 MG: 30 INJECTION, SOLUTION INTRAMUSCULAR; INTRAVENOUS at 12:05

## 2021-05-14 RX ADMIN — HEPARIN SODIUM 5000 UNITS: 5000 INJECTION INTRAVENOUS; SUBCUTANEOUS at 22:17

## 2021-05-14 RX ADMIN — TRAMADOL HYDROCHLORIDE 50 MG: 50 TABLET ORAL at 11:49

## 2021-05-14 RX ADMIN — Medication 5 ML: at 02:00

## 2021-05-14 NOTE — PROGRESS NOTES
Pt rolling from side to side. Pt states, that the dilaudid 1mg iv wears off in minutes, he stated the tramadol was enough, as he takes 2 tabs, I then gave him toradol 15mg iv. Will recheck.

## 2021-05-14 NOTE — PROGRESS NOTES
Bedside shift change report given to Michele Lundberg RN (oncoming nurse) by Roro Pederson RN (offgoing nurse).  Report included the following information SBAR

## 2021-05-14 NOTE — PROGRESS NOTES
TRANSFER - IN REPORT:    Verbal report received from Joy Colón (name) on Jeremy Daily  being received from ED (unit) for routine progression of care      Report consisted of patients Situation, Background, Assessment and   Recommendations(SBAR). Information from the following report(s) SBAR, Kardex, ED Summary and MAR was reviewed with the receiving nurse. Opportunity for questions and clarification was provided. Assessment completed upon patients arrival to unit and care assumed.

## 2021-05-14 NOTE — PROGRESS NOTES
[address] [facility] Adult  Hospitalist Group                                                                                          Hospitalist Progress Note  Kenneth Williamson MD  Answering service: [phone] from in house phone        Date of Service:  2021  NAME:  Karen Kaur  :  1985  MRN:  [id number]      Admission Summary: This is a 44-year-old man with a past medical history significant for hypertension, anxiety disorder, asthma, diabetes insipidus, was in his usual state of health until the day of presentation at the emergency room when the patient developed abdominal pain. The abdominal pain is diffuse in location, constant, sharp pain 10/10 in severity. No known aggravating or relieving factors. The abdominal pain is also associated with nausea and vomiting which is progressive and getting worse    Interval history / Subjective:    moaning in pain , ice pack in lower abdomen  No BM x 2 days CT abd WNL   D/w GI multiple ED visits for the same multiple CT scans as well nothing found  ? Drug seeking behavior asking for percoset      Assessment & Plan:     1. Abdominal pain. - unclear etiology supportive care GI consult, [facility]   - Gastroenterology consult will be requested to assist in further evaluation and treatment. 2.  Intractable nausea and vomiting. We will carry out supportive treatment. This may be related to marijuana use. 3.  Marijuana use. We will carry out supportive treatment. counseled cessation    4. Malignant hypertensive urgency. This is most likely as a result of noncompliance with medication. We will resume preadmission medication. 5.  Anxiety disorder. We will continue with Zoloft. We will check a TSH level. 6.  Asthma. We will place the patient on albuterol as needed. 7.  Diabetes insipidus. We will continue supportive treatment. 8.  Tobacco abuse. The patient advised to quit smoking.   We will place the patient on Nicoderm patch.       Code status:Full  DVT prophylaxis: SCD    Care Plan discussed with: Patient/Family and Nurse  Anticipated Disposition: Home w/Family  Anticipated Discharge: 24 hours to 48 hours     Hospital Problems  Date Reviewed: 5/14/2021          Codes Class Noted POA    * (Principal) Abdominal pain ICD-10-CM: R10.9  ICD-9-CM: 789.00  6/20/2017 Yes                Review of Systems:   A comprehensive review of systems was negative. Vital Signs:    Last 24hrs VS reviewed since prior progress note. Most recent are:  Visit Vitals  BP (!) 178/110 (BP 1 Location: Right upper arm)   Pulse 77   Temp 98.3 °F (36.8 °C)   Resp 18   SpO2 98%       No intake or output data in the 24 hours ending 05/14/21 0946     Physical Examination:     I had a face to face encounter with this patient and independently examined them on 5/14/2021 as outlined below:          Constitutional:  No acute distress, cooperative, pleasant    ENT:  Oral mucosa moist, oropharynx benign. Resp:  CTA bilaterally. No wheezing/rhonchi/rales. No accessory muscle use   CV:  Regular rhythm, normal rate, no murmurs, gallops, rubs    GI:  Soft, non distended, +tender. normoactive bowel sounds, no hepatosplenomegaly     Musculoskeletal:  No edema, warm, 2+ pulses throughout    Neurologic:  Moves all extremities. AAOx3, CN II-XII reviewed            Data Review:    I personally reviewed  Image and labs      Labs:     Recent Labs     05/14/21  0318 05/13/21  1616   WBC 9.7 6.7   HGB 15.8 16.0   HCT 46.7 48.1    362     Recent Labs     05/14/21  0318 05/13/21  1616    141   K 3.7 5.0   * 108   CO2 25 27   BUN 10 8   CREA 1.15 1.12   * 122*   CA 10.4* 10.5*   MG 2.3  --    PHOS 2.8  --      Recent Labs     05/14/21  0318 05/13/21  1616   ALT 34 35    113   TBILI 0.3 0.4   TP 8.9* 9.1*   ALB 4.6 4.7   GLOB 4.3* 4.4*   LPSE 541* 160     No results for input(s): INR, PTP, APTT, INREXT in the last 72 hours. No results for input(s): FE, TIBC, PSAT, FERR in the last 72 hours. No results found for: FOL, RBCF   No results for input(s): PH, PCO2, PO2 in the last 72 hours.   Recent Labs     05/14/21  0318   TROIQ <0.05     No results found for: CHOL, CHOLX, CHLST, CHOLV, HDL, HDLP, LDL, LDLC, DLDLP, TGLX, TRIGL, TRIGP, CHHD, CHHDX  Lab Results   Component Value Date/Time    Glucose (POC) 119 (H) 05/13/2021 04:23 PM    Glucose (POC) 91 12/05/2017 06:31 AM    Glucose (POC) 147 (H) 12/04/2017 09:43 PM    Glucose (POC) 110 (H) 12/04/2017 04:00 PM    Glucose (POC) 119 (H) 06/20/2017 09:40 PM     Lab Results   Component Value Date/Time    Color YELLOW/STRAW 05/13/2021 04:58 PM    Appearance CLEAR 05/13/2021 04:58 PM    Specific gravity 1.006 05/13/2021 04:58 PM    pH (UA) 8.5 (H) 05/13/2021 04:58 PM    Protein Negative 05/13/2021 04:58 PM    Glucose Negative 05/13/2021 04:58 PM    Ketone Negative 05/13/2021 04:58 PM    Bilirubin Negative 05/13/2021 04:58 PM    Urobilinogen 0.2 05/13/2021 04:58 PM    Nitrites Negative 05/13/2021 04:58 PM    Leukocyte Esterase Negative 05/13/2021 04:58 PM    Epithelial cells FEW 05/13/2021 04:58 PM    Bacteria Negative 05/13/2021 04:58 PM    WBC 0-4 05/13/2021 04:58 PM    RBC 0-5 05/13/2021 04:58 PM         Medications Reviewed:     Current Facility-Administered Medications   Medication Dose Route Frequency    sodium chloride (NS) flush 5-40 mL  5-40 mL IntraVENous Q8H    sodium chloride (NS) flush 5-40 mL  5-40 mL IntraVENous PRN    acetaminophen (TYLENOL) tablet 650 mg  650 mg Oral Q6H PRN    Or    acetaminophen (TYLENOL) suppository 650 mg  650 mg Rectal Q6H PRN    polyethylene glycol (MIRALAX) packet 17 g  17 g Oral DAILY PRN    promethazine (PHENERGAN) tablet 12.5 mg  12.5 mg Oral Q6H PRN    Or    ondansetron (ZOFRAN) injection 4 mg  4 mg IntraVENous Q6H PRN    pantoprazole (PROTONIX) 40 mg in 0.9% sodium chloride 10 mL injection  40 mg IntraVENous DAILY    lactated Ringers infusion 75 mL/hr IntraVENous CONTINUOUS    HYDROmorphone (PF) (DILAUDID) injection 1 mg  1 mg IntraVENous Q4H PRN    nicotine (NICODERM CQ) 21 mg/24 hr patch 1 Patch  1 Patch TransDERmal DAILY    amLODIPine (NORVASC) tablet 10 mg  10 mg Oral DAILY    metoprolol succinate (TOPROL-XL) XL tablet 100 mg  100 mg Oral DAILY    sertraline (ZOLOFT) tablet 50 mg  50 mg Oral DAILY    heparin (porcine) injection 5,000 Units  5,000 Units SubCUTAneous Q8H    albuterol-ipratropium (DUO-NEB) 2.5 MG-0.5 MG/3 ML  3 mL Nebulization Q6H PRN    magnesium citrate solution 296 mL  296 mL Oral NOW    traMADoL (ULTRAM) tablet 50 mg  50 mg Oral Q6H PRN    ketorolac (TORADOL) injection 15 mg  15 mg IntraVENous Q6H PRN    hydrALAZINE (APRESOLINE) 20 mg/mL injection 10 mg  10 mg IntraVENous Q6H PRN     ______________________________________________________________________  EXPECTED LENGTH OF STAY: - - -  ACTUAL LENGTH OF STAY:          0                 Wing Howard MD

## 2021-05-14 NOTE — ED NOTES
Patient medicated as ordered. Writhing around in stretcher. Says he's in too much pain to PO challenge. Crackers, water, and ginger ale left at bedside.

## 2021-05-14 NOTE — PROGRESS NOTES
Bedside shift change report given to Gia Ramos RN (oncoming nurse) by Alessandro Concepcion RN (offgoing nurse).  Report included the following information SBAR

## 2021-05-14 NOTE — ROUTINE PROCESS
TRANSFER - OUT REPORT:    Verbal report given to [employer] (name) on Fredi Leigh  being transferred to  (unit) for routine progression of care       Report consisted of patients Situation, Background, Assessment and   Recommendations(SBAR). Information from the following report(s) SBAR, Kardex, ED Summary, Intake/Output, MAR and Recent Results was reviewed with the receiving nurse. Lines:   Peripheral IV 05/13/21 Right Hand (Active)   Site Assessment Clean, dry, & intact 05/13/21 1932   Phlebitis Assessment 0 05/13/21 1932   Infiltration Assessment 0 05/13/21 1932   Dressing Status Clean, dry, & intact 05/13/21 1932        Opportunity for questions and clarification was provided.       Patient transported with:   Mashed Pixel

## 2021-05-14 NOTE — PROGRESS NOTES
Bedside and Verbal shift change report given to Travis (oncoming nurse) by Cathy Hou (offgoing nurse). Report included the following information SBAR, Kardex, ED Summary and MAR.

## 2021-05-14 NOTE — PROGRESS NOTES
Notified Pb MAYER about BP elevation 188/110 and in severe pain. Patient only receiving Toradol Q6h. Patient received Toradol last at 00:39. Still rating pain 10/10 in the abdomen. 04:30 tried to reassess patient's BP and he refused. Stating \"it's not going to change because I am still in pain\"    Patient yelling and agitated due to not being able to receive pain medication until 0600. He refused tylenol when trying to offer him this medication until the dilaudid is due.
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
## 2021-05-14 NOTE — ED NOTES
Patient states toradol is not helping with pain. Requesting to speak to MD Waldron. MD aware and to come to bedside when able.

## 2021-05-15 VITALS
HEIGHT: 69 IN | BODY MASS INDEX: 28.88 KG/M2 | RESPIRATION RATE: 22 BRPM | OXYGEN SATURATION: 98 % | SYSTOLIC BLOOD PRESSURE: 169 MMHG | TEMPERATURE: 98.7 F | HEART RATE: 84 BPM | DIASTOLIC BLOOD PRESSURE: 108 MMHG

## 2021-05-15 PROBLEM — R10.9 ABDOMINAL PAIN: Status: RESOLVED | Noted: 2017-06-20 | Resolved: 2021-05-15

## 2021-05-15 PROCEDURE — 74011250637 HC RX REV CODE- 250/637: Performed by: INTERNAL MEDICINE

## 2021-05-15 PROCEDURE — C9113 INJ PANTOPRAZOLE SODIUM, VIA: HCPCS | Performed by: INTERNAL MEDICINE

## 2021-05-15 PROCEDURE — 74011250636 HC RX REV CODE- 250/636: Performed by: INTERNAL MEDICINE

## 2021-05-15 PROCEDURE — 74011250637 HC RX REV CODE- 250/637: Performed by: PHYSICIAN ASSISTANT

## 2021-05-15 PROCEDURE — 99218 HC RM OBSERVATION: CPT

## 2021-05-15 PROCEDURE — 74011000250 HC RX REV CODE- 250: Performed by: NURSE PRACTITIONER

## 2021-05-15 PROCEDURE — 74011000250 HC RX REV CODE- 250: Performed by: INTERNAL MEDICINE

## 2021-05-15 PROCEDURE — 2709999900 HC NON-CHARGEABLE SUPPLY

## 2021-05-15 PROCEDURE — 96376 TX/PRO/DX INJ SAME DRUG ADON: CPT

## 2021-05-15 PROCEDURE — 96372 THER/PROPH/DIAG INJ SC/IM: CPT

## 2021-05-15 PROCEDURE — 96375 TX/PRO/DX INJ NEW DRUG ADDON: CPT

## 2021-05-15 PROCEDURE — 74011250637 HC RX REV CODE- 250/637: Performed by: HOSPITALIST

## 2021-05-15 RX ORDER — PANTOPRAZOLE SODIUM 40 MG/1
40 TABLET, DELAYED RELEASE ORAL DAILY
Qty: 30 TAB | Refills: 0 | Status: SHIPPED | OUTPATIENT
Start: 2021-05-15 | End: 2021-06-14

## 2021-05-15 RX ORDER — CLONIDINE 0.1 MG/24H
1 PATCH, EXTENDED RELEASE TRANSDERMAL
Status: DISCONTINUED | OUTPATIENT
Start: 2021-05-15 | End: 2021-05-15 | Stop reason: HOSPADM

## 2021-05-15 RX ORDER — LABETALOL HYDROCHLORIDE 5 MG/ML
10 INJECTION, SOLUTION INTRAVENOUS ONCE
Status: COMPLETED | OUTPATIENT
Start: 2021-05-15 | End: 2021-05-15

## 2021-05-15 RX ORDER — DICYCLOMINE HYDROCHLORIDE 10 MG/1
20 CAPSULE ORAL 3 TIMES DAILY
Qty: 30 CAP | Refills: 0 | Status: SHIPPED | OUTPATIENT
Start: 2021-05-15 | End: 2021-05-20

## 2021-05-15 RX ADMIN — Medication 10 ML: at 07:07

## 2021-05-15 RX ADMIN — DICYCLOMINE HYDROCHLORIDE 20 MG: 10 CAPSULE ORAL at 09:56

## 2021-05-15 RX ADMIN — HYDROMORPHONE HYDROCHLORIDE 1 MG: 1 INJECTION, SOLUTION INTRAMUSCULAR; INTRAVENOUS; SUBCUTANEOUS at 07:04

## 2021-05-15 RX ADMIN — KETOROLAC TROMETHAMINE 15 MG: 30 INJECTION, SOLUTION INTRAMUSCULAR; INTRAVENOUS at 05:10

## 2021-05-15 RX ADMIN — ACETAMINOPHEN 650 MG: 325 TABLET ORAL at 09:52

## 2021-05-15 RX ADMIN — METOPROLOL SUCCINATE 100 MG: 50 TABLET, EXTENDED RELEASE ORAL at 07:09

## 2021-05-15 RX ADMIN — LABETALOL HYDROCHLORIDE 10 MG: 5 INJECTION INTRAVENOUS at 05:49

## 2021-05-15 RX ADMIN — HEPARIN SODIUM 5000 UNITS: 5000 INJECTION INTRAVENOUS; SUBCUTANEOUS at 05:57

## 2021-05-15 RX ADMIN — HYDROMORPHONE HYDROCHLORIDE 1 MG: 1 INJECTION, SOLUTION INTRAMUSCULAR; INTRAVENOUS; SUBCUTANEOUS at 02:41

## 2021-05-15 RX ADMIN — SODIUM CHLORIDE, POTASSIUM CHLORIDE, SODIUM LACTATE AND CALCIUM CHLORIDE 75 ML/HR: 600; 310; 30; 20 INJECTION, SOLUTION INTRAVENOUS at 01:47

## 2021-05-15 RX ADMIN — AMLODIPINE BESYLATE 10 MG: 5 TABLET ORAL at 07:08

## 2021-05-15 RX ADMIN — TRAMADOL HYDROCHLORIDE 50 MG: 50 TABLET ORAL at 09:52

## 2021-05-15 RX ADMIN — SODIUM CHLORIDE 40 MG: 9 INJECTION INTRAMUSCULAR; INTRAVENOUS; SUBCUTANEOUS at 09:52

## 2021-05-15 RX ADMIN — HYDROMORPHONE HYDROCHLORIDE 1 MG: 1 INJECTION, SOLUTION INTRAMUSCULAR; INTRAVENOUS; SUBCUTANEOUS at 10:47

## 2021-05-15 RX ADMIN — SERTRALINE 50 MG: 50 TABLET, FILM COATED ORAL at 09:56

## 2021-05-15 RX ADMIN — Medication 10 ML: at 00:00
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
## 2021-05-15 NOTE — PROGRESS NOTES
05/15/21 0301   Vital Signs   Temp 98.7 °F (37.1 °C)   Temp Source Oral   Pulse (Heart Rate) 96   Heart Rate Source Brachial   Resp Rate 18   O2 Sat (%) 98 %   Level of Consciousness Alert (0)   BP (!) 201/119   MAP (Calculated) 146   BP 1 Method Automatic   BP 1 Location Left upper arm   BP Patient Position At rest   MEWS Score 3   consistent with previous given breakthrough iv hydralazine but consistently high , patient is very anxious and constantly moving and crying, holding his abdomen due to pain, per patient. Will notify hospitalist on call.

## 2021-05-15 NOTE — PROGRESS NOTES
I have reviewed discharge instructions with the patient. The patient verbalized understanding. Signs, symptoms, and side effects reviewed. Opportunity for questions and clarification allowed. Patient discharging home via private vehicle with hard copies of prescriptions. Signed, hard-copy of discharge instructions placed on patient's chart.

## 2021-05-15 NOTE — DISCHARGE INSTRUCTIONS
Discharge Instructions       PATIENT ID: Topher Mayen  MRN: [id number]   YOB: 1985    DATE OF ADMISSION: 5/13/2021  3:30 PM    DATE OF DISCHARGE: 5/15/2021    PRIMARY CARE PROVIDER: Larry Strauss MD     ATTENDING PHYSICIAN: Leanna Sarabia MD  DISCHARGING PROVIDER: Ada Rudd MD    To contact this individual call [phone] and ask the  to page. If unavailable ask to be transferred the Adult Hospitalist Department. DISCHARGE DIAGNOSES Abdominal pain of uncertain etiology    CONSULTATIONS: IP CONSULT TO GASTROENTEROLOGY    PROCEDURES/SURGERIES: * No surgery found *    PENDING TEST RESULTS:   At the time of discharge the following test results are still pending:     FOLLOW UP APPOINTMENTS:   Follow-up Information     Follow up With Specialties Details Why Contact Info    Larry Strauss MD Family Medicine In 1 week  [address]  [phone]             ADDITIONAL CARE RECOMMENDATIONS:     DIET: Regular Diet    ACTIVITY: Activity as tolerated    WOUND CARE:     EQUIPMENT needed:       Radiology      DISCHARGE MEDICATIONS:   See Medication Reconciliation Form    · It is important that you take the medication exactly as they are prescribed. · Keep your medication in the bottles provided by the pharmacist and keep a list of the medication names, dosages, and times to be taken in your wallet. · Do not take other medications without consulting your doctor. NOTIFY YOUR PHYSICIAN FOR ANY OF THE FOLLOWING:   Fever over 101 degrees for 24 hours. Chest pain, shortness of breath, fever, chills, nausea, vomiting, diarrhea, change in mentation, falling, weakness, bleeding. Severe pain or pain not relieved by medications. Or, any other signs or symptoms that you may have questions about.       DISPOSITION:   x Home With:   OT  PT  HH  RN       SNF/Inpatient Rehab/LTAC    Independent/assisted living    Hospice    Other:     CDMP Checked:   Yes x     PROBLEM LIST Updated:  Yes x       Signed:   Samson Chavez MD  5/15/2021  9:20 AM

## 2021-09-28 ENCOUNTER — HOSPITAL ENCOUNTER (EMERGENCY)
Age: 36
Discharge: LEFT BEFORE DISCHARGE | End: 2021-09-28
Attending: EMERGENCY MEDICINE | Admitting: EMERGENCY MEDICINE
Payer: MEDICAID

## 2021-09-28 VITALS
TEMPERATURE: 99.1 F | SYSTOLIC BLOOD PRESSURE: 148 MMHG | HEART RATE: 115 BPM | RESPIRATION RATE: 16 BRPM | DIASTOLIC BLOOD PRESSURE: 100 MMHG | OXYGEN SATURATION: 98 %

## 2021-09-28 LAB
ALBUMIN SERPL-MCNC: 4.2 G/DL (ref 3.5–5)
ALBUMIN/GLOB SERPL: 0.9 {RATIO} (ref 1.1–2.2)
ALP SERPL-CCNC: 113 U/L (ref 45–117)
ALT SERPL-CCNC: 68 U/L (ref 12–78)
ANION GAP SERPL CALC-SCNC: 9 MMOL/L (ref 5–15)
APAP SERPL-MCNC: <2 UG/ML (ref 10–30)
AST SERPL-CCNC: 20 U/L (ref 15–37)
BASOPHILS # BLD: 0 K/UL (ref 0–0.1)
BASOPHILS NFR BLD: 0 % (ref 0–1)
BILIRUB SERPL-MCNC: 0.5 MG/DL (ref 0.2–1)
BUN SERPL-MCNC: 9 MG/DL (ref 6–20)
BUN/CREAT SERPL: 11 (ref 12–20)
CALCIUM SERPL-MCNC: 10.1 MG/DL (ref 8.5–10.1)
CHLORIDE SERPL-SCNC: 102 MMOL/L (ref 97–108)
CO2 SERPL-SCNC: 24 MMOL/L (ref 21–32)
CREAT SERPL-MCNC: 0.83 MG/DL (ref 0.7–1.3)
DIFFERENTIAL METHOD BLD: ABNORMAL
EOSINOPHIL # BLD: 0 K/UL (ref 0–0.4)
EOSINOPHIL NFR BLD: 1 % (ref 0–7)
ERYTHROCYTE [DISTWIDTH] IN BLOOD BY AUTOMATED COUNT: 13.2 % (ref 11.5–14.5)
ETHANOL SERPL-MCNC: <10 MG/DL
GLOBULIN SER CALC-MCNC: 4.7 G/DL (ref 2–4)
GLUCOSE SERPL-MCNC: 104 MG/DL (ref 65–100)
HCT VFR BLD AUTO: 48.3 % (ref 36.6–50.3)
HGB BLD-MCNC: 17.5 G/DL (ref 12.1–17)
IMM GRANULOCYTES # BLD AUTO: 0 K/UL (ref 0–0.04)
IMM GRANULOCYTES NFR BLD AUTO: 0 % (ref 0–0.5)
LYMPHOCYTES # BLD: 1.4 K/UL (ref 0.8–3.5)
LYMPHOCYTES NFR BLD: 21 % (ref 12–49)
MCH RBC QN AUTO: 31.8 PG (ref 26–34)
MCHC RBC AUTO-ENTMCNC: 36.2 G/DL (ref 30–36.5)
MCV RBC AUTO: 87.7 FL (ref 80–99)
MONOCYTES # BLD: 0.5 K/UL (ref 0–1)
MONOCYTES NFR BLD: 7 % (ref 5–13)
NEUTS SEG # BLD: 4.8 K/UL (ref 1.8–8)
NEUTS SEG NFR BLD: 71 % (ref 32–75)
NRBC # BLD: 0 K/UL (ref 0–0.01)
NRBC BLD-RTO: 0 PER 100 WBC
PLATELET # BLD AUTO: 409 K/UL (ref 150–400)
PMV BLD AUTO: 9.5 FL (ref 8.9–12.9)
POTASSIUM SERPL-SCNC: 3.5 MMOL/L (ref 3.5–5.1)
PROT SERPL-MCNC: 8.9 G/DL (ref 6.4–8.2)
RBC # BLD AUTO: 5.51 M/UL (ref 4.1–5.7)
SALICYLATES SERPL-MCNC: 2.5 MG/DL (ref 2.8–20)
SODIUM SERPL-SCNC: 135 MMOL/L (ref 136–145)
WBC # BLD AUTO: 6.9 K/UL (ref 4.1–11.1)

## 2021-09-28 PROCEDURE — 75810000275 HC EMERGENCY DEPT VISIT NO LEVEL OF CARE

## 2021-09-28 PROCEDURE — 90791 PSYCH DIAGNOSTIC EVALUATION: CPT

## 2021-09-28 PROCEDURE — 80143 DRUG ASSAY ACETAMINOPHEN: CPT

## 2021-09-28 PROCEDURE — 80053 COMPREHEN METABOLIC PANEL: CPT

## 2021-09-28 PROCEDURE — 36415 COLL VENOUS BLD VENIPUNCTURE: CPT

## 2021-09-28 PROCEDURE — 85025 COMPLETE CBC W/AUTO DIFF WBC: CPT

## 2021-09-28 PROCEDURE — 82077 ASSAY SPEC XCP UR&BREATH IA: CPT

## 2021-09-28 PROCEDURE — 80179 DRUG ASSAY SALICYLATE: CPT

## 2021-09-28 NOTE — BSMART NOTE
Comprehensive Assessment Form Part 1      Section I - Disposition    Axis I - Generalized Anxiety Disorder (by hx)   Axis II - deferred  Axis III -   Past Medical History:   Diagnosis Date    Asthma     Bronchitis    Diabetes ([address]     Gastroparesis     Hypertension     Psychiatric disorder     anxiety       The Medical Doctor to Psychiatrist conference was not completed. The Medical Doctor is in agreement with Psychiatrist disposition because of (reason) pt not meeting admission criteria. The plan is NA as pt stated he did not desire BSMART services. The on-call Psychiatrist consulted was Dr. Camilo Hooper. The admitting Psychiatrist will be Dr. Camilo Hooper. The admitting Diagnosis is NA. The Payor source is [facility] MEDICAID/[facility]. Section II - Integrated Summary  Summary:  Per triage, \"Patient presents from home via EMS with complaints of worsening anxiety. Patient has been without his xanax for 4 days, reports he takes it everyday. Patient states \"I feel week. \"     Pt is a 37year old male presenting to ED reporting anxiety, running out of Xanax and feeling weak. \" Pt presented as irritable and anxious. Pt was A&Ox4. Pt was rocking back and forth moaning stating his stomach hurt. Pt shared he is not suicidal or homicidal. Pt denied hallucinations. Pt shared his sleep and appetite are within normal limits. Pt shared no SA. Pt stated he did not wish to continue with assessment that he was here for medical reasons and that his stomach hurt thus referred back to attending. The patienthas demonstrated mental capacity to provide informed consent. The information is given by the patient and past medical records. The Chief Complaint is medical issues (stomach pain). The Precipitant Factors are he reported to provider he ran out of Xanax. Previous Hospitalizations: denied  The patient has not previously been in restraints. Current Psychiatrist and/or  is no one.     Lethality Assessment:    The potential for suicide not noted. The potential for homicide is not noted. The patient has not been a perpetrator of sexual or physical abuse. There are not pending charges. The patient is not felt to be at risk for self harm or harm to others. The attending nurse was advised that security has not been notified. Section III - Psychosocial  The patient's overall mood and attitude is anxious. Feelings of helplessness and hopelessness are not observed. Generalized anxiety is observed by current presentation. Panic is not observed. Phobias are not observed. Obsessive compulsive tendencies are not observed. Section IV - Mental Status Exam  The patient's appearance shows no evidence of impairment. The patient's behavior is agitated, shows poor eye contact and is restless. The patient is oriented to time, place, person and situation. The patient's speech shows no evidence of impairment. The patient's mood is anxious and is irritable. The range of affect is constricted. The patient's thought content demonstrates no evidence of impairment. The thought process shows no evidence of impairment. The patient's perception shows no evidence of impairment. The patient's memory shows no evidence of impairment. The patient's appetite shows no evidence of impairment. The patient's sleep shows no evidence of impairment. The patient's insight shows no evidence of impairment and The patient shows little insight. The patient's judgement shows no evidence of impairment. Section V - Substance Abuse  The patient is not using substances. Section VI - Living Arrangements  The patient is single. The patient lives with a parent. The patient has no children. The patient does plan to return home upon discharge. The patient does not have legal issues pending. The patient's source of income comes from disability.   Orthodoxy and cultural practices have not been voiced at this time.    The patient's greatest support comes from mother and this person will not be involved with the treatment. The patient has not been in an event described as horrible or outside the realm of ordinary life experience either currently or in the past.  The patient has not been a victim of sexual/physical abuse. Section VII - Other Areas of Clinical Concern  The highest grade achieved is unknown with the overall quality of school experience being described as unknown. The patient is currently disabled and speaks [place] as a primary language. The patient has no communication impairments affecting communication. The patient's preference for learning can be described as: can read and write adequately.   The patient's hearing is normal.  The patient's vision is normal.      Sadaf Camarena mS, Resident in Counseling

## 2021-09-28 NOTE — ED TRIAGE NOTES
Patient presents from home via EMS with complaints of worsening anxiety. Patient has been without his xanax for 4 days, reports he takes it everyday.  Patient states \"I feel week\"

## 2021-09-28 NOTE — ED NOTES
6:03 PM  I have evaluated the patient as the Provider in Triage. I have reviewed His vital signs and the triage nurse assessment. I have talked with the patient and any available family and advised that I am the provider in triage and have ordered the appropriate study to initiate their work up based on the clinical presentation during my assessment. I have advised that the patient will be accommodated in the Main ED as soon as possible. I have also requested to contact the triage nurse or myself immediately if the patient experiences any changes in their condition during this brief waiting period. Pt moaning in triage, crying, stating that he feels bad and he needs to be admitted. Pt initially stated he ran out of Xanax 4 days ago but  indicates that he has not filled a benzo Rx since 12 tabs of Alprazalom in July. Type I diabetic, complaining of abd pain, SOB. Will place labs, move to [facility].       MELANIE Dorantes

## 2022-03-19 PROBLEM — I10 HYPERTENSION: Status: ACTIVE | Noted: 2017-06-20

## 2022-03-19 PROBLEM — N31.9 BLADDER DYSFUNCTION: Status: ACTIVE | Noted: 2017-06-22

## 2022-03-19 PROBLEM — E23.2 DIABETES INSIPIDUS (HCC): Status: ACTIVE | Noted: 2017-06-20

## 2022-03-19 PROBLEM — K29.70 GASTRITIS: Status: ACTIVE | Noted: 2017-06-22

## 2022-03-19 PROBLEM — E87.0 HYPERNATREMIA: Status: ACTIVE | Noted: 2017-06-22

## 2022-03-19 PROBLEM — N13.30 HYDRONEPHROSIS, RIGHT: Status: ACTIVE | Noted: 2017-06-22

## 2022-06-25 ENCOUNTER — HOSPITAL ENCOUNTER (OUTPATIENT)
Age: 37
Setting detail: OBSERVATION
Discharge: LEFT BEFORE DISCHARGE | End: 2022-06-26
Attending: EMERGENCY MEDICINE | Admitting: FAMILY MEDICINE
Payer: MEDICAID

## 2022-06-25 ENCOUNTER — APPOINTMENT (OUTPATIENT)
Dept: CT IMAGING | Age: 37
End: 2022-06-25
Attending: EMERGENCY MEDICINE
Payer: MEDICAID

## 2022-06-25 DIAGNOSIS — N13.30 HYDRONEPHROSIS, UNSPECIFIED HYDRONEPHROSIS TYPE: ICD-10-CM

## 2022-06-25 DIAGNOSIS — R10.84 ABDOMINAL PAIN, GENERALIZED: Primary | ICD-10-CM

## 2022-06-25 DIAGNOSIS — K31.84 GASTROPARESIS: ICD-10-CM

## 2022-06-25 PROBLEM — R10.9 ABDOMINAL PAIN: Status: ACTIVE | Noted: 2022-06-25

## 2022-06-25 LAB
ALBUMIN SERPL-MCNC: 4.5 G/DL (ref 3.5–5)
ALBUMIN/GLOB SERPL: 1.1 {RATIO} (ref 1.1–2.2)
ALP SERPL-CCNC: 98 U/L (ref 45–117)
ALT SERPL-CCNC: 39 U/L (ref 12–78)
ANION GAP SERPL CALC-SCNC: 7 MMOL/L (ref 5–15)
AST SERPL-CCNC: 15 U/L (ref 15–37)
ATRIAL RATE: 82 BPM
BASOPHILS # BLD: 0 K/UL (ref 0–0.1)
BASOPHILS NFR BLD: 0 % (ref 0–1)
BILIRUB SERPL-MCNC: 0.5 MG/DL (ref 0.2–1)
BNP SERPL-MCNC: 11 PG/ML
BUN SERPL-MCNC: 9 MG/DL (ref 6–20)
BUN/CREAT SERPL: 9 (ref 12–20)
CALCIUM SERPL-MCNC: 10.5 MG/DL (ref 8.5–10.1)
CALCULATED P AXIS, ECG09: 73 DEGREES
CALCULATED R AXIS, ECG10: 61 DEGREES
CALCULATED T AXIS, ECG11: 58 DEGREES
CHLORIDE SERPL-SCNC: 107 MMOL/L (ref 97–108)
CO2 SERPL-SCNC: 27 MMOL/L (ref 21–32)
COMMENT, HOLDF: NORMAL
CREAT SERPL-MCNC: 1.01 MG/DL (ref 0.7–1.3)
DIAGNOSIS, 93000: NORMAL
DIFFERENTIAL METHOD BLD: ABNORMAL
EOSINOPHIL # BLD: 0 K/UL (ref 0–0.4)
EOSINOPHIL NFR BLD: 1 % (ref 0–7)
ERYTHROCYTE [DISTWIDTH] IN BLOOD BY AUTOMATED COUNT: 14.2 % (ref 11.5–14.5)
GLOBULIN SER CALC-MCNC: 4.1 G/DL (ref 2–4)
GLUCOSE SERPL-MCNC: 134 MG/DL (ref 65–100)
HCT VFR BLD AUTO: 45.9 % (ref 36.6–50.3)
HGB BLD-MCNC: 16.5 G/DL (ref 12.1–17)
IMM GRANULOCYTES # BLD AUTO: 0 K/UL (ref 0–0.04)
IMM GRANULOCYTES NFR BLD AUTO: 0 % (ref 0–0.5)
LIPASE SERPL-CCNC: 188 U/L (ref 73–393)
LYMPHOCYTES # BLD: 1.2 K/UL (ref 0.8–3.5)
LYMPHOCYTES NFR BLD: 19 % (ref 12–49)
MCH RBC QN AUTO: 31.3 PG (ref 26–34)
MCHC RBC AUTO-ENTMCNC: 35.9 G/DL (ref 30–36.5)
MCV RBC AUTO: 87.1 FL (ref 80–99)
MONOCYTES # BLD: 0.4 K/UL (ref 0–1)
MONOCYTES NFR BLD: 6 % (ref 5–13)
NEUTS SEG # BLD: 4.8 K/UL (ref 1.8–8)
NEUTS SEG NFR BLD: 74 % (ref 32–75)
NRBC # BLD: 0 K/UL (ref 0–0.01)
NRBC BLD-RTO: 0 PER 100 WBC
P-R INTERVAL, ECG05: 166 MS
PLATELET # BLD AUTO: 323 K/UL (ref 150–400)
PMV BLD AUTO: 8.8 FL (ref 8.9–12.9)
POTASSIUM SERPL-SCNC: 3.4 MMOL/L (ref 3.5–5.1)
PROT SERPL-MCNC: 8.6 G/DL (ref 6.4–8.2)
Q-T INTERVAL, ECG07: 370 MS
QRS DURATION, ECG06: 100 MS
QTC CALCULATION (BEZET), ECG08: 432 MS
RBC # BLD AUTO: 5.27 M/UL (ref 4.1–5.7)
SAMPLES BEING HELD,HOLD: NORMAL
SODIUM SERPL-SCNC: 141 MMOL/L (ref 136–145)
TROPONIN-HIGH SENSITIVITY: 6 NG/L (ref 0–76)
VENTRICULAR RATE, ECG03: 82 BPM
WBC # BLD AUTO: 6.4 K/UL (ref 4.1–11.1)

## 2022-06-25 PROCEDURE — 74011000636 HC RX REV CODE- 636: Performed by: RADIOLOGY

## 2022-06-25 PROCEDURE — 74011250637 HC RX REV CODE- 250/637: Performed by: FAMILY MEDICINE

## 2022-06-25 PROCEDURE — 74011250636 HC RX REV CODE- 250/636: Performed by: EMERGENCY MEDICINE

## 2022-06-25 PROCEDURE — 84484 ASSAY OF TROPONIN QUANT: CPT

## 2022-06-25 PROCEDURE — 74011250636 HC RX REV CODE- 250/636: Performed by: FAMILY MEDICINE

## 2022-06-25 PROCEDURE — 96375 TX/PRO/DX INJ NEW DRUG ADDON: CPT

## 2022-06-25 PROCEDURE — 96376 TX/PRO/DX INJ SAME DRUG ADON: CPT

## 2022-06-25 PROCEDURE — 96374 THER/PROPH/DIAG INJ IV PUSH: CPT

## 2022-06-25 PROCEDURE — 36415 COLL VENOUS BLD VENIPUNCTURE: CPT

## 2022-06-25 PROCEDURE — 83880 ASSAY OF NATRIURETIC PEPTIDE: CPT

## 2022-06-25 PROCEDURE — 74177 CT ABD & PELVIS W/CONTRAST: CPT

## 2022-06-25 PROCEDURE — 85025 COMPLETE CBC W/AUTO DIFF WBC: CPT

## 2022-06-25 PROCEDURE — 74011250636 HC RX REV CODE- 250/636: Performed by: NURSE PRACTITIONER

## 2022-06-25 PROCEDURE — 99285 EMERGENCY DEPT VISIT HI MDM: CPT

## 2022-06-25 PROCEDURE — 83690 ASSAY OF LIPASE: CPT

## 2022-06-25 PROCEDURE — 80053 COMPREHEN METABOLIC PANEL: CPT

## 2022-06-25 PROCEDURE — G0378 HOSPITAL OBSERVATION PER HR: HCPCS

## 2022-06-25 PROCEDURE — 93005 ELECTROCARDIOGRAM TRACING: CPT

## 2022-06-25 PROCEDURE — 74011000250 HC RX REV CODE- 250: Performed by: FAMILY MEDICINE

## 2022-06-25 PROCEDURE — 96372 THER/PROPH/DIAG INJ SC/IM: CPT

## 2022-06-25 RX ORDER — SODIUM CHLORIDE 0.9 % (FLUSH) 0.9 %
5-40 SYRINGE (ML) INJECTION EVERY 8 HOURS
Status: DISCONTINUED | OUTPATIENT
Start: 2022-06-25 | End: 2022-06-26 | Stop reason: HOSPADM

## 2022-06-25 RX ORDER — KETOROLAC TROMETHAMINE 30 MG/ML
15 INJECTION, SOLUTION INTRAMUSCULAR; INTRAVENOUS ONCE
Status: COMPLETED | OUTPATIENT
Start: 2022-06-25 | End: 2022-06-25

## 2022-06-25 RX ORDER — SODIUM CHLORIDE 0.9 % (FLUSH) 0.9 %
5-40 SYRINGE (ML) INJECTION AS NEEDED
Status: DISCONTINUED | OUTPATIENT
Start: 2022-06-25 | End: 2022-06-26 | Stop reason: HOSPADM

## 2022-06-25 RX ORDER — POTASSIUM CHLORIDE 750 MG/1
10 TABLET, FILM COATED, EXTENDED RELEASE ORAL
Status: COMPLETED | OUTPATIENT
Start: 2022-06-25 | End: 2022-06-25

## 2022-06-25 RX ORDER — HYDRALAZINE HYDROCHLORIDE 20 MG/ML
10 INJECTION INTRAMUSCULAR; INTRAVENOUS
Status: DISCONTINUED | OUTPATIENT
Start: 2022-06-25 | End: 2022-06-26 | Stop reason: HOSPADM

## 2022-06-25 RX ORDER — METOPROLOL SUCCINATE 50 MG/1
100 TABLET, EXTENDED RELEASE ORAL DAILY
Status: DISCONTINUED | OUTPATIENT
Start: 2022-06-26 | End: 2022-06-26 | Stop reason: HOSPADM

## 2022-06-25 RX ORDER — AMLODIPINE BESYLATE 5 MG/1
10 TABLET ORAL DAILY
Status: DISCONTINUED | OUTPATIENT
Start: 2022-06-26 | End: 2022-06-26 | Stop reason: HOSPADM

## 2022-06-25 RX ORDER — OXYCODONE AND ACETAMINOPHEN 5; 325 MG/1; MG/1
1 TABLET ORAL ONCE
Status: COMPLETED | OUTPATIENT
Start: 2022-06-25 | End: 2022-06-25

## 2022-06-25 RX ORDER — SERTRALINE HYDROCHLORIDE 50 MG/1
50 TABLET, FILM COATED ORAL DAILY
Status: DISCONTINUED | OUTPATIENT
Start: 2022-06-26 | End: 2022-06-26 | Stop reason: HOSPADM

## 2022-06-25 RX ORDER — MORPHINE SULFATE 2 MG/ML
1 INJECTION, SOLUTION INTRAMUSCULAR; INTRAVENOUS
Status: DISCONTINUED | OUTPATIENT
Start: 2022-06-25 | End: 2022-06-26 | Stop reason: HOSPADM

## 2022-06-25 RX ORDER — ONDANSETRON 2 MG/ML
4 INJECTION INTRAMUSCULAR; INTRAVENOUS
Status: DISCONTINUED | OUTPATIENT
Start: 2022-06-25 | End: 2022-06-26 | Stop reason: HOSPADM

## 2022-06-25 RX ORDER — MORPHINE SULFATE 4 MG/ML
4 INJECTION INTRAVENOUS
Status: COMPLETED | OUTPATIENT
Start: 2022-06-25 | End: 2022-06-25

## 2022-06-25 RX ORDER — ONDANSETRON 2 MG/ML
4 INJECTION INTRAMUSCULAR; INTRAVENOUS
Status: COMPLETED | OUTPATIENT
Start: 2022-06-25 | End: 2022-06-25

## 2022-06-25 RX ORDER — DICYCLOMINE HYDROCHLORIDE 10 MG/ML
20 INJECTION INTRAMUSCULAR
Status: COMPLETED | OUTPATIENT
Start: 2022-06-25 | End: 2022-06-25

## 2022-06-25 RX ORDER — DICYCLOMINE HYDROCHLORIDE 10 MG/1
10 CAPSULE ORAL
Status: DISCONTINUED | OUTPATIENT
Start: 2022-06-25 | End: 2022-06-26 | Stop reason: HOSPADM

## 2022-06-25 RX ORDER — MORPHINE SULFATE 2 MG/ML
2 INJECTION, SOLUTION INTRAMUSCULAR; INTRAVENOUS
Status: COMPLETED | OUTPATIENT
Start: 2022-06-25 | End: 2022-06-25

## 2022-06-25 RX ORDER — ACETAMINOPHEN 325 MG/1
650 TABLET ORAL
Status: DISCONTINUED | OUTPATIENT
Start: 2022-06-25 | End: 2022-06-26 | Stop reason: HOSPADM

## 2022-06-25 RX ORDER — SODIUM CHLORIDE 9 MG/ML
75 INJECTION, SOLUTION INTRAVENOUS CONTINUOUS
Status: DISCONTINUED | OUTPATIENT
Start: 2022-06-25 | End: 2022-06-26 | Stop reason: HOSPADM

## 2022-06-25 RX ADMIN — POTASSIUM CHLORIDE 10 MEQ: 750 TABLET, FILM COATED, EXTENDED RELEASE ORAL at 16:15

## 2022-06-25 RX ADMIN — SODIUM CHLORIDE 1000 ML: 900 INJECTION, SOLUTION INTRAVENOUS at 12:00

## 2022-06-25 RX ADMIN — SODIUM CHLORIDE, PRESERVATIVE FREE 10 ML: 5 INJECTION INTRAVENOUS at 16:57

## 2022-06-25 RX ADMIN — DICYCLOMINE HYDROCHLORIDE 20 MG: 20 INJECTION, SOLUTION INTRAMUSCULAR at 13:57

## 2022-06-25 RX ADMIN — MORPHINE SULFATE 4 MG: 4 INJECTION, SOLUTION INTRAMUSCULAR; INTRAVENOUS at 13:35

## 2022-06-25 RX ADMIN — MORPHINE SULFATE 2 MG: 2 INJECTION, SOLUTION INTRAMUSCULAR; INTRAVENOUS at 12:30

## 2022-06-25 RX ADMIN — Medication 15 MG: at 16:12

## 2022-06-25 RX ADMIN — ONDANSETRON HYDROCHLORIDE 4 MG: 2 INJECTION, SOLUTION INTRAMUSCULAR; INTRAVENOUS at 12:20

## 2022-06-25 RX ADMIN — OXYCODONE AND ACETAMINOPHEN 1 TABLET: 5; 325 TABLET ORAL at 17:44

## 2022-06-25 RX ADMIN — IOPAMIDOL 100 ML: 755 INJECTION, SOLUTION INTRAVENOUS at 14:26

## 2022-06-25 RX ADMIN — MORPHINE SULFATE 1 MG: 2 INJECTION, SOLUTION INTRAMUSCULAR; INTRAVENOUS at 23:39

## 2022-06-25 RX ADMIN — HYDRALAZINE HYDROCHLORIDE 10 MG: 20 INJECTION INTRAMUSCULAR; INTRAVENOUS at 16:20

## 2022-06-25 RX ADMIN — SODIUM CHLORIDE 75 ML/HR: 9 INJECTION, SOLUTION INTRAVENOUS at 16:58

## 2022-06-25 RX ADMIN — HYDRALAZINE HYDROCHLORIDE 10 MG: 20 INJECTION INTRAMUSCULAR; INTRAVENOUS at 23:32

## 2022-06-25 NOTE — ED NOTES
Has a final transfer review been performed? yes    Reason for Admission: abdominal pain  Patient comes from: home  Mental Status: Alert and oriented  ADL:independent  Ambulation:yes/steady  Pertinent Info/Safety Concerns:   COVID Status: Not Tested  MEWS Score: 1  Vitals:    06/25/22 1431 06/25/22 1527 06/25/22 1530 06/25/22 1539   BP:    (!) 187/89   Pulse: 95 89 (!) 102 98   Resp: 28 20 17 17   Temp:    98.2 °F (36.8 °C)   SpO2: 100%   98%   Weight:       Height:         Transport mode:Wheelchair    TRANSFER - OUT REPORT:    Liana Milton  being transferred to [address] [facility] for routine progression of care     \"Notification of etransfer note given to . Report consisted of patient's Situation, Background, Assessment and   Recommendations(SBAR). Lines:   Peripheral IV 06/25/22 Anterior;Right Hand (Active)        Opportunity for questions and clarification was provided.

## 2022-06-25 NOTE — ED TRIAGE NOTES
Pt arrives for increased abd pain, pmh of pancreatitis, [facility] and told there is nothing to do for his pain per patient. He was given a medication that he has taken but is unsure of the name. Patient is in visible extreme discomfort. Unable to take BP meds due to vomiting and is severely hypertensive.
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
## 2022-06-25 NOTE — PROGRESS NOTES
1644 - Patient arrived on floor complaining of abdominal pain. 1656- Vitals taken. /137, HR 99. RN further assessed patient. 46 - MD notified of vitals and complaints of pain. 1732 - Orders received for pain medication  1744 - Pain medication administered. 1836 - /122, HR 94  1845 - MD notified of vital signs. No new orders at this time. Coty - NP assessed patient. Orders received.
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
## 2022-06-25 NOTE — ED NOTES
Patient states that the pain medicine did not do a lot to help and agreed to take the shot of Bentyl. Pt now going to CT with tech. normal...

## 2022-06-26 ENCOUNTER — APPOINTMENT (OUTPATIENT)
Dept: GENERAL RADIOLOGY | Age: 37
End: 2022-06-26
Attending: NURSE PRACTITIONER
Payer: MEDICAID

## 2022-06-26 VITALS
WEIGHT: 230 LBS | TEMPERATURE: 98.3 F | SYSTOLIC BLOOD PRESSURE: 193 MMHG | OXYGEN SATURATION: 100 % | HEART RATE: 109 BPM | DIASTOLIC BLOOD PRESSURE: 125 MMHG | RESPIRATION RATE: 20 BRPM | BODY MASS INDEX: 34.86 KG/M2 | HEIGHT: 68 IN

## 2022-06-26 PROCEDURE — 74011250637 HC RX REV CODE- 250/637: Performed by: NURSE PRACTITIONER

## 2022-06-26 PROCEDURE — G0378 HOSPITAL OBSERVATION PER HR: HCPCS

## 2022-06-26 PROCEDURE — C9113 INJ PANTOPRAZOLE SODIUM, VIA: HCPCS | Performed by: FAMILY MEDICINE

## 2022-06-26 PROCEDURE — 74011250637 HC RX REV CODE- 250/637: Performed by: FAMILY MEDICINE

## 2022-06-26 PROCEDURE — 74011250636 HC RX REV CODE- 250/636: Performed by: FAMILY MEDICINE

## 2022-06-26 PROCEDURE — 96376 TX/PRO/DX INJ SAME DRUG ADON: CPT

## 2022-06-26 PROCEDURE — 74018 RADEX ABDOMEN 1 VIEW: CPT

## 2022-06-26 PROCEDURE — 74011250636 HC RX REV CODE- 250/636: Performed by: NURSE PRACTITIONER

## 2022-06-26 PROCEDURE — 96375 TX/PRO/DX INJ NEW DRUG ADDON: CPT

## 2022-06-26 PROCEDURE — 74011000250 HC RX REV CODE- 250: Performed by: FAMILY MEDICINE

## 2022-06-26 RX ORDER — HYDROXYZINE 25 MG/1
25 TABLET, FILM COATED ORAL
Status: DISCONTINUED | OUTPATIENT
Start: 2022-06-26 | End: 2022-06-26 | Stop reason: HOSPADM

## 2022-06-26 RX ORDER — OXYCODONE AND ACETAMINOPHEN 5; 325 MG/1; MG/1
1 TABLET ORAL
Status: DISCONTINUED | OUTPATIENT
Start: 2022-06-26 | End: 2022-06-26 | Stop reason: HOSPADM

## 2022-06-26 RX ORDER — KETOROLAC TROMETHAMINE 30 MG/ML
15 INJECTION, SOLUTION INTRAMUSCULAR; INTRAVENOUS
Status: COMPLETED | OUTPATIENT
Start: 2022-06-26 | End: 2022-06-26

## 2022-06-26 RX ADMIN — KETOROLAC TROMETHAMINE 15 MG: 30 INJECTION, SOLUTION INTRAMUSCULAR at 01:04

## 2022-06-26 RX ADMIN — SERTRALINE 50 MG: 50 TABLET, FILM COATED ORAL at 08:25

## 2022-06-26 RX ADMIN — OXYCODONE AND ACETAMINOPHEN 1 TABLET: 5; 325 TABLET ORAL at 09:24

## 2022-06-26 RX ADMIN — AMLODIPINE BESYLATE 10 MG: 5 TABLET ORAL at 08:25

## 2022-06-26 RX ADMIN — SODIUM CHLORIDE 40 MG: 9 INJECTION, SOLUTION INTRAMUSCULAR; INTRAVENOUS; SUBCUTANEOUS at 08:25

## 2022-06-26 RX ADMIN — METOPROLOL SUCCINATE 100 MG: 50 TABLET, EXTENDED RELEASE ORAL at 08:25

## 2022-06-26 RX ADMIN — HYDROXYZINE HYDROCHLORIDE 25 MG: 25 TABLET, FILM COATED ORAL at 10:36

## 2022-06-26 RX ADMIN — ONDANSETRON HYDROCHLORIDE 4 MG: 2 SOLUTION INTRAMUSCULAR; INTRAVENOUS at 00:24

## 2022-06-26 RX ADMIN — HYDROXYZINE HYDROCHLORIDE 25 MG: 25 TABLET, FILM COATED ORAL at 00:32

## 2022-06-26 NOTE — PROGRESS NOTES
0000: Pt has started throwing up- NP instructed him to not eat or drink anything the rest of the night. RN gave zofran. 0030: Pt stated that the morphine he has been given for pain is causing more pain. 0200: Pt is acting aggressive, slamming doors, arguing with staff. Will not attempt to do labs at this time, will try later. Pt not complying with NPO order after being told by 3 RNs.     5742: Patient is not compliant with NPO order. RN has told patient multiple times he cannot drink anything until the MD follows up with him this morning. Pt stated \"It is morning. I'm drinking. \"     0715: pt asked for pain medication, RN brought the PRN morphine. Pt. Julius Monday \"I told you to bring me dilaudid. \" RN informed patient that he only has morphine ordered. Pt. Refused morphine, stating we are not taking care of him.

## 2022-06-26 NOTE — PROGRESS NOTES
CM reviewed chart. Pt is independent with his ADLs and IADLs. Plan is to return home at time of discharge. Medicare Outpatient Observation Notice (MOON)/ [place] Outpatient Observation Notice (Steven Folds) provided to patient/representative with verbal explanation of the notice. Time allotted for questions regarding the notice. Patient /representative provided a completed copy of the MOON/VOON notice. Copy placed on bedside chart.   KAT Hare

## 2022-06-26 NOTE — PROGRESS NOTES
1045: Pt requesting to leave AMA. Writhing in pain. Suggested PRN Atarax, pt agreed, however decided he would rather leave after receiving it. Parents at bedside. Pt requesting Xanax, stating it is the only thing that helps his anxiety, disclosed PCP change Rx to Zoloft and it is not working. Educated pt that it takes time for effects to be noticed. Discussed w/ pt and family that PCP has access to records and encouraged to follow up with PCP. Also highly suggested pt should receive a referal for Psychiatrist and/or therapist. Pt agreed, verbalized understanding. AMA form signed. IV removed. Pt walked off unit accompanied by parents. 0825: Pt appeared to be asleep when entered room, awake stating he was in pain, \"I wish you would just let me sleep. \" Pt stated he is allergic to morphine, that it \"makes me hurt worse\", asking for Toradol or Percocet as they both worked in the ED yesterday. MD notified, orders received.

## 2022-06-26 NOTE — DISCHARGE SUMMARY
Discharge Summary       PATIENT ID: Sheila Kramer  MRN: [id number]   YOB: 1985    DATE OF ADMISSION: 6/25/2022 11:36 AM    DATE OF DISCHARGE: 6/26/2022   PRIMARY CARE PROVIDER: Veronica Dia MD     ATTENDING PHYSICIAN: Shane Fischer  DISCHARGING PROVIDER: Candy Boston MD    To contact this individual call [phone] and ask the  to page. If unavailable ask to be transferred the Adult Hospitalist Department. CONSULTATIONS: None    PROCEDURES/SURGERIES: * No surgery found *    DISCHARGE DIAGNOSES:     Abdominal pain  Hydronephrosis  Hypertensive urgency  Anxiety disorder    ADMISSION SUMMARY AND HOSPITAL COURSE:     Patient is a 73-year-old male who presents with 2 to 3 weeks history of abdominal pain. CT of the abdomen and pelvis was done in the ER that shows chronic moderate right hydronephrosis with right hydroureter. There is new mild left hydronephrosis and left hydroureter of the bladder. Patient also with hypertensive urgency on admission. Before I was able to evaluate the patient, patient left AMA.     DISCHARGE DIAGNOSES / PLAN:        BMI: Body mass index is 34.97 kg/m². . This patient: Meets criteria for obesity given BMI >/= 30 and < 40 due to excess calories/nutritional. Weight loss and lifestyle modifications should be encouraged as an outpatient. NOTIFY YOUR PHYSICIAN FOR ANY OF THE FOLLOWING:   Fever over 101 degrees for 24 hours. Chest pain, shortness of breath, fever, chills, nausea, vomiting, diarrhea, change in mentation, falling, weakness, bleeding. Severe pain or pain not relieved by medications, as well as any other signs or symptoms that you may have questions about.     FOLLOW UP APPOINTMENTS:    Follow-up Information    None              DISCHARGE MEDICATIONS:  Discharge Medication List as of 6/26/2022 10:43 AM          DISPOSITION:    Home With:   OT  PT  HH  RN       Long term SNF/Inpatient Rehab    Independent/assisted living    Hospice Other: Left AMA       PATIENT CONDITION AT DISCHARGE:     Functional status    Poor     Deconditioned     Independent      Cognition     Lucid     Forgetful     Dementia      Catheters/lines (plus indication)    Gonzalez     PICC     PEG     None      Code status     Full code     DNR      PHYSICAL EXAMINATION AT DISCHARGE:  Physical exam cannot be completed as patient left AMA.       CHRONIC MEDICAL DIAGNOSES:  Problem List as of 6/26/2022 Date Reviewed: 5/14/2021          Codes Class Noted - Resolved    Abdominal pain ICD-10-CM: R10.9  ICD-9-CM: 789.00  6/25/2022 - Present        Gastritis ICD-10-CM: K29.70  ICD-9-CM: 535.50  6/22/2017 - Present        Bladder dysfunction (Chronic) ICD-10-CM: N31.9  ICD-9-CM: 596.59  6/22/2017 - Present        Hydronephrosis, right ICD-10-CM: N13.30  ICD-9-CM: 072  6/22/2017 - Present        Hypernatremia ICD-10-CM: E87.0  ICD-9-CM: 276.0  6/22/2017 - Present        Hypertension (Chronic) ICD-10-CM: I10  ICD-9-CM: 401.9  6/20/2017 - Present        Diabetes insipidus ([facility] [address] (Chronic) ICD-10-CM: E23.2  ICD-9-CM: 253.5  6/20/2017 - Present        RESOLVED: Nausea & vomiting ICD-10-CM: R11.2  ICD-9-CM: 787.01  12/4/2017 - 12/6/2017        RESOLVED: Abdominal pain ICD-10-CM: R10.9  ICD-9-CM: 789.00  6/20/2017 - 5/15/2021                Signed:   Jocelyn Monge MD  6/26/2022  3:26 PM

## 2022-06-26 NOTE — PROGRESS NOTES
Bedside and Verbal shift change report given to Erin Mtz (oncoming nurse) by Jacques Solares and Juan Antonio Mejia  (offgoing nurse). Report included the following information SBAR.

## 2022-06-26 NOTE — CONSULTS
Patient with abdominal pain, found to have significant bladder distention and mild bilat hydro on CT. Images reviewed. No UA obtained - would collect and then start empirically on abx for prostatitis (quinolone or bactrim). Also start Flomax.  Can f/u in office in 1-2 weeks for re-assessment, [phone] to schedule

## 2022-07-18 ENCOUNTER — TRANSCRIBE ORDER (OUTPATIENT)
Dept: SCHEDULING | Age: 37
End: 2022-07-18

## 2022-07-18 DIAGNOSIS — D64.9 ANEMIA, UNSPECIFIED: ICD-10-CM

## 2022-07-18 DIAGNOSIS — R14.0 BLOATING: Primary | ICD-10-CM

## 2022-07-18 DIAGNOSIS — R11.2 NAUSEA WITH VOMITING: ICD-10-CM

## 2022-10-03 ENCOUNTER — ANESTHESIA (OUTPATIENT)
Dept: ENDOSCOPY | Age: 37
End: 2022-10-03
Payer: MEDICAID

## 2022-10-03 ENCOUNTER — TRANSCRIBE ORDER (OUTPATIENT)
Dept: SCHEDULING | Age: 37
End: 2022-10-03

## 2022-10-03 ENCOUNTER — HOSPITAL ENCOUNTER (OUTPATIENT)
Age: 37
Setting detail: OUTPATIENT SURGERY
Discharge: HOME | End: 2022-10-03
Attending: INTERNAL MEDICINE | Admitting: INTERNAL MEDICINE
Payer: MEDICAID

## 2022-10-03 ENCOUNTER — ANESTHESIA EVENT (OUTPATIENT)
Dept: ENDOSCOPY | Age: 37
End: 2022-10-03
Payer: MEDICAID

## 2022-10-03 VITALS
DIASTOLIC BLOOD PRESSURE: 114 MMHG | WEIGHT: 233.4 LBS | SYSTOLIC BLOOD PRESSURE: 180 MMHG | BODY MASS INDEX: 35.37 KG/M2 | TEMPERATURE: 98.2 F | HEIGHT: 68 IN | RESPIRATION RATE: 16 BRPM | HEART RATE: 88 BPM | OXYGEN SATURATION: 99 %

## 2022-10-03 DIAGNOSIS — R10.9 ABDOMINAL PAIN: Primary | ICD-10-CM

## 2022-10-03 LAB
H PYLORI FROM TISSUE: NEGATIVE
KIT LOT NO., HCLOLOT: NORMAL
NEGATIVE CONTROL: NEGATIVE
POSITIVE CONTROL: POSITIVE

## 2022-10-03 PROCEDURE — 76060000031 HC ANESTHESIA FIRST 0.5 HR: Performed by: INTERNAL MEDICINE

## 2022-10-03 PROCEDURE — 74011000250 HC RX REV CODE- 250: Performed by: NURSE ANESTHETIST, CERTIFIED REGISTERED

## 2022-10-03 PROCEDURE — 77030021593 HC FCPS BIOP ENDOSC BSC -A: Performed by: INTERNAL MEDICINE

## 2022-10-03 PROCEDURE — 74011250636 HC RX REV CODE- 250/636: Performed by: NURSE ANESTHETIST, CERTIFIED REGISTERED

## 2022-10-03 PROCEDURE — 76040000019: Performed by: INTERNAL MEDICINE

## 2022-10-03 PROCEDURE — 88305 TISSUE EXAM BY PATHOLOGIST: CPT

## 2022-10-03 PROCEDURE — 74011250636 HC RX REV CODE- 250/636: Performed by: INTERNAL MEDICINE

## 2022-10-03 PROCEDURE — 2709999900 HC NON-CHARGEABLE SUPPLY: Performed by: INTERNAL MEDICINE

## 2022-10-03 PROCEDURE — 87077 CULTURE AEROBIC IDENTIFY: CPT | Performed by: INTERNAL MEDICINE

## 2022-10-03 RX ORDER — SODIUM CHLORIDE 9 MG/ML
INJECTION, SOLUTION INTRAVENOUS
Status: DISCONTINUED | OUTPATIENT
Start: 2022-10-03 | End: 2022-10-03 | Stop reason: HOSPADM

## 2022-10-03 RX ORDER — SODIUM CHLORIDE 0.9 % (FLUSH) 0.9 %
5-40 SYRINGE (ML) INJECTION AS NEEDED
Status: DISCONTINUED | OUTPATIENT
Start: 2022-10-03 | End: 2022-10-03 | Stop reason: HOSPADM

## 2022-10-03 RX ORDER — NALOXONE HYDROCHLORIDE 0.4 MG/ML
0.4 INJECTION, SOLUTION INTRAMUSCULAR; INTRAVENOUS; SUBCUTANEOUS
Status: DISCONTINUED | OUTPATIENT
Start: 2022-10-03 | End: 2022-10-03 | Stop reason: HOSPADM

## 2022-10-03 RX ORDER — EPINEPHRINE 0.1 MG/ML
1 INJECTION INTRACARDIAC; INTRAVENOUS
Status: DISCONTINUED | OUTPATIENT
Start: 2022-10-03 | End: 2022-10-03 | Stop reason: HOSPADM

## 2022-10-03 RX ORDER — ONDANSETRON 2 MG/ML
4-8 INJECTION INTRAMUSCULAR; INTRAVENOUS ONCE
Status: COMPLETED | OUTPATIENT
Start: 2022-10-03 | End: 2022-10-03

## 2022-10-03 RX ORDER — PROPOFOL 10 MG/ML
INJECTION, EMULSION INTRAVENOUS AS NEEDED
Status: DISCONTINUED | OUTPATIENT
Start: 2022-10-03 | End: 2022-10-03 | Stop reason: HOSPADM

## 2022-10-03 RX ORDER — DEXTROMETHORPHAN/PSEUDOEPHED 2.5-7.5/.8
1.2 DROPS ORAL
Status: DISCONTINUED | OUTPATIENT
Start: 2022-10-03 | End: 2022-10-03 | Stop reason: HOSPADM

## 2022-10-03 RX ORDER — SODIUM CHLORIDE 9 MG/ML
50 INJECTION, SOLUTION INTRAVENOUS CONTINUOUS
Status: DISCONTINUED | OUTPATIENT
Start: 2022-10-03 | End: 2022-10-03 | Stop reason: HOSPADM

## 2022-10-03 RX ORDER — LIDOCAINE HYDROCHLORIDE 20 MG/ML
INJECTION, SOLUTION EPIDURAL; INFILTRATION; INTRACAUDAL; PERINEURAL AS NEEDED
Status: DISCONTINUED | OUTPATIENT
Start: 2022-10-03 | End: 2022-10-03 | Stop reason: HOSPADM

## 2022-10-03 RX ORDER — SODIUM CHLORIDE 0.9 % (FLUSH) 0.9 %
5-40 SYRINGE (ML) INJECTION EVERY 8 HOURS
Status: DISCONTINUED | OUTPATIENT
Start: 2022-10-03 | End: 2022-10-03 | Stop reason: HOSPADM

## 2022-10-03 RX ORDER — FLUMAZENIL 0.1 MG/ML
0.2 INJECTION INTRAVENOUS
Status: DISCONTINUED | OUTPATIENT
Start: 2022-10-03 | End: 2022-10-03 | Stop reason: HOSPADM

## 2022-10-03 RX ORDER — ATROPINE SULFATE 0.1 MG/ML
0.5 INJECTION INTRAVENOUS
Status: DISCONTINUED | OUTPATIENT
Start: 2022-10-03 | End: 2022-10-03 | Stop reason: HOSPADM

## 2022-10-03 RX ADMIN — PROPOFOL 30 MG: 10 INJECTION, EMULSION INTRAVENOUS at 12:06

## 2022-10-03 RX ADMIN — PROPOFOL 30 MG: 10 INJECTION, EMULSION INTRAVENOUS at 12:02

## 2022-10-03 RX ADMIN — PROPOFOL 100 MG: 10 INJECTION, EMULSION INTRAVENOUS at 11:58

## 2022-10-03 RX ADMIN — LIDOCAINE HYDROCHLORIDE 60 MG: 20 INJECTION, SOLUTION EPIDURAL; INFILTRATION; INTRACAUDAL; PERINEURAL at 11:58

## 2022-10-03 RX ADMIN — PROPOFOL 30 MG: 10 INJECTION, EMULSION INTRAVENOUS at 12:03

## 2022-10-03 RX ADMIN — SODIUM CHLORIDE: 900 INJECTION, SOLUTION INTRAVENOUS at 11:24

## 2022-10-03 RX ADMIN — PROPOFOL 30 MG: 10 INJECTION, EMULSION INTRAVENOUS at 12:00

## 2022-10-03 RX ADMIN — PROPOFOL 30 MG: 10 INJECTION, EMULSION INTRAVENOUS at 11:59

## 2022-10-03 NOTE — PROGRESS NOTES
Pt is up in wheelchair, mother supportive at patients side, awaiting transport. Pt is still c/o pain and nausea but said it is \"getting better\". Pt blood pressure elevated entire visit. Pt blames on pain and said pain is letting up. Pt gave word to take blood pressure medication when he gets home. Blood pressure slightly better and back to pre-admission level. Transportation to  at the front of the hospital.  Mother waiting with him. Pt repeated that he is feeling better post bathroom trip and denies chest pain. Pt stated his ride was coming and he was ready to leave the hospital.  Educated the importance of taking blood pressure medication and to follow up with his PCP if blood pressure remains elevated.

## 2022-10-03 NOTE — ANESTHESIA PREPROCEDURE EVALUATION
Anesthetic History   No history of anesthetic complications            Review of Systems / Medical History  Patient summary reviewed, nursing notes reviewed and pertinent labs reviewed    Pulmonary          Smoker  Asthma : well controlled       Neuro/Psych         Psychiatric history     Cardiovascular    Hypertension              Exercise tolerance: >4 METS     GI/Hepatic/Renal               Comments: Epigastric pain  Gastroparesis (K31.84) Endo/Other    Diabetes         Other Findings              Physical Exam    Airway  Mallampati: II  TM Distance: > 6 cm  Neck ROM: normal range of motion   Mouth opening: Diminished (comment)     Cardiovascular    Rhythm: regular  Rate: normal         Dental  No notable dental hx       Pulmonary  Breath sounds clear to auscultation               Abdominal  Abdominal exam normal       Other Findings            Anesthetic Plan    ASA: 3  Anesthesia type: MAC          Induction: Intravenous  Anesthetic plan and risks discussed with: Patient

## 2022-10-03 NOTE — PROCEDURES
[address]  [address] [facility] [address]  [place], [address]  [phone]                            NAME:  Nilson Lopez   :   1985   MRN:   [id number]     Date/Time:  10/3/2022 12:06 PM    Esophagogastroduodenoscopy (EGD) Procedure Note    :  Ministerio Cornejo MD    Staff: Endoscopy Technician-1: Amol Cotton  Endoscopy RN-1: Cuate Brooks RN    Referring Provider:  Norma Pacheoc MD    Anethesia/Sedation:  MAC    Procedure Details   After infomed consent was obtained for the procedure, with all risks and benefits of procedure explained the patient was taken to the endoscopy suite and placed in the left lateral decubitus position. Following sequential administration of sedation as per above, the gastroscope was inserted into the mouth and advanced under direct vision to second portion of the duodenum. A careful inspection was made as the gastroscope was withdrawn, including a retroflexed view of the proximal stomach; findings and interventions are described below. Findings:  Esophagus: 3 cm hiatal hernia without intrahernia erosions or ulcers, no ring or stricture, no ulcerations  Stomach: pancreatic rest in proximal antrum without any inflammatory changes or edema; otherwise normal, no ulcers or obstruction of stomach; random biopsies obtained from antrum to exclude H pylori via rapid urease testing  Duodenum:normal; random biopsies obtained from duodenal bulb and second portion to exclude enteropathy             Specimens Removed:    ID Type Source Tests Collected by Time Destination   1 : pathology Preservative Duodenum  Susan Seymour MD 10/3/2022 1202 Pathology       Complications: None. EBL:  none    Impression:    See Postoperative diagnosis above    Recommendations:   - Await pathology evaluation of biopsy / resected tissue  - Resume normal medications. - Resume previous diet.     Discharge disposition:  Home in the company of  when able to ambulate    Shahana Camarena MD
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
## 2022-10-03 NOTE — PROGRESS NOTES
Pt had N/V upon arrival into preop,400cc yellow fluid--anesthesia  with abdominal pain 9/10and dr. Tracy Corrales informed--will go ahead with egd. ZOFRAN 4 MG IVEN IV.

## 2022-10-03 NOTE — DISCHARGE INSTRUCTIONS
[address] [facility] Brady Potts MD  [phone]      October 3, 2022     [place] Records  YOB: 1985    ENDOSCOPY DISCHARGE INSTRUCTIONS    If there is redness at IV site you should apply warm compress to area. If redness or soreness persist contact Dr. Sabine Potts or your primary care doctor. Gaseous discomfort may develop, but walking, belching will help relieve this. You may not operate a vehicle for 12 hours  You may not operate machinery or dangerous appliances for rest of today  You may not drink alcoholic beverages for 12 hours  Avoid making any critical decisions for 24 hours    DIET:  You may resume your normal diet, but some patients find that heavy or large meals may lead to indigestion or vomiting. I suggest a light meal as first food intake. MEDICATIONS:  The use of some over-the-counter pain medication may lead to bleeding after biopsies or other procedures you may have had done. Tylenol (also called acetaminophen) is safe to take and will not lead to bleeding. Based on the procedure you can resume baby-dose aspirin (81 mg) and may safely take anticoagulation (like apixaban (Eliquis), dabigatran (Pradaxa), edoxaban (Lonne Iba), rivaroxaban (Xarelto) or warfarin (Coumadin)) or antiplatelet medications (high dose aspirin 325mg, Clopidogrel (Plavix), Prasugrel (Effient), Ticagrelor (Brilinta))for the next two (48 hours) days. ACTIVITY:  You may resume your normal household activities, but it is recommended that you spend the remainder of the day resting -  avoid any strenuous activity. CALL DR. MARTI'S OFFICE IF:  Increasing pain, nausea, vomiting  Abdominal distension (swelling)  Significant new or increased bleeding (oral or rectal)  Fever/Chills  Chest pain/shortness of breath                   Additional instructions:   Small hiatal hernia on this exam. A hiatal hernia is when a part of the stomach moves up into the chest area. Normally, the stomach sits below the diaphragm, the layer of muscle that separates the organs in the chest from the organs in the belly. The esophagus, the tube that carries food from the mouth to the stomach, passes through a hole in the diaphragm. In people with a hiatal hernia, the stomach pushes up through that hole, too. Your hiatal hernia is not severe, it is not causing any symptoms, and does not require surgical correction at this time. You have a small pancreatic rest in your stomach. This is when there is a small amount of pancreatic tissue that stays in the stomach lining instead of migrating properly to the rest of the pancreas during embryonic development. They extremely rarely cause issues, do not cause cancer, and do not require removal.    Otherwise, you had an unremarkable examination of the esophagus, stomach, and duodenum. Random biopsies were obtained from the stomach and duodenum. Will mail pathology report and any other further recommendations in 7-10 business days; if any urgent finding will contact you by phone. It was an honor to be your doctor today. Please let me or my office staff know if you have any feedback about today's procedure.     Ashley Mcgill MD, October 3, 2022

## 2022-10-03 NOTE — ANESTHESIA POSTPROCEDURE EVALUATION
Procedure(s):  ESOPHAGOGASTRODUODENOSCOPY (EGD)  ESOPHAGOGASTRODUODENAL (EGD) BIOPSY. MAC    Anesthesia Post Evaluation      Multimodal analgesia: multimodal analgesia used between 6 hours prior to anesthesia start to PACU discharge  Patient location during evaluation: PACU  Patient participation: complete - patient participated  Level of consciousness: awake and alert  Pain management: adequate  Airway patency: patent  Anesthetic complications: no  Cardiovascular status: acceptable  Respiratory status: acceptable  Hydration status: acceptable  Comments: Seen, no complaints, to take his BP medication when he gets home   Post anesthesia nausea and vomiting:  none  Final Post Anesthesia Temperature Assessment:  Normothermia (36.0-37.5 degrees C)      INITIAL Post-op Vital signs:   Vitals Value Taken Time   /90 10/03/22 1310   Temp 36.8 °C (98.2 °F) 10/03/22 1225   Pulse 96 10/03/22 1310   Resp 37 10/03/22 1310   SpO2 97 % 10/03/22 1310   Vitals shown include unvalidated device data.

## 2022-11-09 ENCOUNTER — HOSPITAL ENCOUNTER (EMERGENCY)
Age: 37
Discharge: HOME | End: 2022-11-09
Attending: EMERGENCY MEDICINE
Payer: MEDICAID

## 2022-11-09 ENCOUNTER — APPOINTMENT (OUTPATIENT)
Dept: CT IMAGING | Age: 37
End: 2022-11-09
Attending: EMERGENCY MEDICINE
Payer: MEDICAID

## 2022-11-09 VITALS
OXYGEN SATURATION: 100 % | HEART RATE: 75 BPM | TEMPERATURE: 98.3 F | SYSTOLIC BLOOD PRESSURE: 175 MMHG | RESPIRATION RATE: 17 BRPM | DIASTOLIC BLOOD PRESSURE: 109 MMHG

## 2022-11-09 DIAGNOSIS — G89.29 CHRONIC ABDOMINAL PAIN: Primary | ICD-10-CM

## 2022-11-09 DIAGNOSIS — R11.2 NAUSEA AND VOMITING, UNSPECIFIED VOMITING TYPE: ICD-10-CM

## 2022-11-09 DIAGNOSIS — R10.9 CHRONIC ABDOMINAL PAIN: Primary | ICD-10-CM

## 2022-11-09 LAB
ALBUMIN SERPL-MCNC: 4.6 G/DL (ref 3.5–5)
ALBUMIN/GLOB SERPL: 1.2 {RATIO} (ref 1.1–2.2)
ALP SERPL-CCNC: 84 U/L (ref 45–117)
ALT SERPL-CCNC: 27 U/L (ref 12–78)
ANION GAP SERPL CALC-SCNC: 8 MMOL/L (ref 5–15)
APPEARANCE UR: CLEAR
AST SERPL-CCNC: 29 U/L (ref 15–37)
BACTERIA URNS QL MICRO: NEGATIVE /HPF
BASOPHILS # BLD: 0 K/UL (ref 0–0.1)
BASOPHILS NFR BLD: 1 % (ref 0–1)
BILIRUB SERPL-MCNC: 0.5 MG/DL (ref 0.2–1)
BILIRUB UR QL: NEGATIVE
BUN SERPL-MCNC: 6 MG/DL (ref 6–20)
BUN/CREAT SERPL: 6 (ref 12–20)
CALCIUM SERPL-MCNC: 10.2 MG/DL (ref 8.5–10.1)
CHLORIDE SERPL-SCNC: 107 MMOL/L (ref 97–108)
CO2 SERPL-SCNC: 25 MMOL/L (ref 21–32)
COLOR UR: NORMAL
COMMENT, HOLDF: NORMAL
COMMENT, HOLDF: NORMAL
CREAT SERPL-MCNC: 1.01 MG/DL (ref 0.7–1.3)
DIFFERENTIAL METHOD BLD: NORMAL
EOSINOPHIL # BLD: 0 K/UL (ref 0–0.4)
EOSINOPHIL NFR BLD: 1 % (ref 0–7)
EPITH CASTS URNS QL MICRO: NORMAL /LPF
ERYTHROCYTE [DISTWIDTH] IN BLOOD BY AUTOMATED COUNT: 13.6 % (ref 11.5–14.5)
GLOBULIN SER CALC-MCNC: 4 G/DL (ref 2–4)
GLUCOSE SERPL-MCNC: 118 MG/DL (ref 65–100)
GLUCOSE UR STRIP.AUTO-MCNC: NEGATIVE MG/DL
HCT VFR BLD AUTO: 47.7 % (ref 36.6–50.3)
HGB BLD-MCNC: 16.4 G/DL (ref 12.1–17)
HGB UR QL STRIP: NEGATIVE
IMM GRANULOCYTES # BLD AUTO: 0 K/UL (ref 0–0.04)
IMM GRANULOCYTES NFR BLD AUTO: 0 % (ref 0–0.5)
KETONES UR QL STRIP.AUTO: NEGATIVE MG/DL
LEUKOCYTE ESTERASE UR QL STRIP.AUTO: NEGATIVE
LIPASE SERPL-CCNC: 159 U/L (ref 73–393)
LYMPHOCYTES # BLD: 1.4 K/UL (ref 0.8–3.5)
LYMPHOCYTES NFR BLD: 23 % (ref 12–49)
MCH RBC QN AUTO: 31.7 PG (ref 26–34)
MCHC RBC AUTO-ENTMCNC: 34.4 G/DL (ref 30–36.5)
MCV RBC AUTO: 92.1 FL (ref 80–99)
MONOCYTES # BLD: 0.4 K/UL (ref 0–1)
MONOCYTES NFR BLD: 6 % (ref 5–13)
NEUTS SEG # BLD: 4.1 K/UL (ref 1.8–8)
NEUTS SEG NFR BLD: 69 % (ref 32–75)
NITRITE UR QL STRIP.AUTO: NEGATIVE
NRBC # BLD: 0 K/UL (ref 0–0.01)
NRBC BLD-RTO: 0 PER 100 WBC
PH UR STRIP: 7.5 [PH] (ref 5–8)
PLATELET # BLD AUTO: 376 K/UL (ref 150–400)
PMV BLD AUTO: 10.2 FL (ref 8.9–12.9)
POTASSIUM SERPL-SCNC: 3.9 MMOL/L (ref 3.5–5.1)
PROT SERPL-MCNC: 8.6 G/DL (ref 6.4–8.2)
PROT UR STRIP-MCNC: NEGATIVE MG/DL
RBC # BLD AUTO: 5.18 M/UL (ref 4.1–5.7)
RBC #/AREA URNS HPF: NORMAL /HPF (ref 0–5)
SAMPLES BEING HELD,HOLD: NORMAL
SAMPLES BEING HELD,HOLD: NORMAL
SODIUM SERPL-SCNC: 140 MMOL/L (ref 136–145)
SP GR UR REFRACTOMETRY: <1.005 (ref 1–1.03)
UROBILINOGEN UR QL STRIP.AUTO: 0.2 EU/DL (ref 0.2–1)
WBC # BLD AUTO: 6 K/UL (ref 4.1–11.1)
WBC URNS QL MICRO: NORMAL /HPF (ref 0–4)

## 2022-11-09 PROCEDURE — 74011000636 HC RX REV CODE- 636: Performed by: EMERGENCY MEDICINE

## 2022-11-09 PROCEDURE — 83690 ASSAY OF LIPASE: CPT

## 2022-11-09 PROCEDURE — 96375 TX/PRO/DX INJ NEW DRUG ADDON: CPT

## 2022-11-09 PROCEDURE — 36415 COLL VENOUS BLD VENIPUNCTURE: CPT

## 2022-11-09 PROCEDURE — 99285 EMERGENCY DEPT VISIT HI MDM: CPT

## 2022-11-09 PROCEDURE — 74011250637 HC RX REV CODE- 250/637: Performed by: EMERGENCY MEDICINE

## 2022-11-09 PROCEDURE — 74011000258 HC RX REV CODE- 258: Performed by: EMERGENCY MEDICINE

## 2022-11-09 PROCEDURE — 81001 URINALYSIS AUTO W/SCOPE: CPT

## 2022-11-09 PROCEDURE — 74177 CT ABD & PELVIS W/CONTRAST: CPT

## 2022-11-09 PROCEDURE — 96374 THER/PROPH/DIAG INJ IV PUSH: CPT

## 2022-11-09 PROCEDURE — 80053 COMPREHEN METABOLIC PANEL: CPT

## 2022-11-09 PROCEDURE — 74011250636 HC RX REV CODE- 250/636: Performed by: EMERGENCY MEDICINE

## 2022-11-09 PROCEDURE — 85025 COMPLETE CBC W/AUTO DIFF WBC: CPT

## 2022-11-09 RX ORDER — MORPHINE SULFATE 4 MG/ML
4 INJECTION INTRAVENOUS
Status: COMPLETED | OUTPATIENT
Start: 2022-11-09 | End: 2022-11-09

## 2022-11-09 RX ORDER — DICYCLOMINE HYDROCHLORIDE 10 MG/1
10 CAPSULE ORAL
Qty: 20 CAPSULE | Refills: 0 | Status: SHIPPED | OUTPATIENT
Start: 2022-11-09

## 2022-11-09 RX ORDER — ACETAMINOPHEN 325 MG/1
650 TABLET ORAL
Status: COMPLETED | OUTPATIENT
Start: 2022-11-09 | End: 2022-11-09

## 2022-11-09 RX ORDER — ONDANSETRON 4 MG/1
4 TABLET, FILM COATED ORAL
Qty: 20 TABLET | Refills: 0 | Status: SHIPPED | OUTPATIENT
Start: 2022-11-09

## 2022-11-09 RX ADMIN — MORPHINE SULFATE 4 MG: 4 INJECTION INTRAVENOUS at 10:49

## 2022-11-09 RX ADMIN — SODIUM CHLORIDE 1000 ML: 9 INJECTION, SOLUTION INTRAVENOUS at 10:49

## 2022-11-09 RX ADMIN — ACETAMINOPHEN 650 MG: 325 TABLET, FILM COATED ORAL at 14:14

## 2022-11-09 RX ADMIN — PROMETHAZINE HYDROCHLORIDE 25 MG: 25 INJECTION INTRAMUSCULAR; INTRAVENOUS at 11:04

## 2022-11-09 RX ADMIN — IOPAMIDOL 100 ML: 755 INJECTION, SOLUTION INTRAVENOUS at 12:50
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
## 2022-11-09 NOTE — ED NOTES
Patient given urine cup for specimen when able, states he will not be able to provide one. Normal saline and Phenergan infusing as prescribed. Patient lying on bench in [place], requesting more pain meds. MD notified. On monitor x4.
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
## 2022-11-09 NOTE — ED TRIAGE NOTES
Patient arrived via EMS with c/o left sided abdominal pain and vomiting x3 days.      PMH HTN, took meds last night but unable to keep down

## 2022-11-09 NOTE — PROGRESS NOTES
Spiritual Care Assessment/Progress Note  [address]      NAME: Aftab Azar      MRN: [id number]  AGE: 31 y.o.  SEX: male  Zoroastrian Affiliation: No Sikhism   Language: English     11/9/2022     Total Time (in minutes): 21     Spiritual Assessment begun in [facility] EMERGENCY DEPT through conversation with:         [x]Patient        [] Family    [] Friend(s)        Reason for Consult: Emergency Department visit, Initial/Spiritual assessment, patient floor     Spiritual beliefs: (Please include comment if needed)     [] Identifies with a flora tradition:         [] Supported by a flora community:            [] Claims no spiritual orientation:           [] Seeking spiritual identity:                [x] Adheres to an individual form of spirituality:           [] Not able to assess:                           Identified resources for coping:      [] Prayer                               [] Music                  [] Guided Imagery     [x] Family/friends                 [] Pet visits     [] Devotional reading                         [] Unknown     [] Other:                                               Interventions offered during this visit: (See comments for more details)    Patient Interventions: Affirmation of emotions/emotional suffering, Coping skills reviewed/reinforced, Normalization of emotional/spiritual concerns           Plan of Care:     [x] Support spiritual and/or cultural needs    [] Support AMD and/or advance care planning process      [x] Support grieving process   [] Coordinate Rites and/or Rituals    [] Coordination with community clergy   [] No spiritual needs identified at this time   [] Detailed Plan of Care below (See Comments)  [] Make referral to Music Therapy  [] Make referral to Pet Therapy     [] Make referral to Addiction services  [] Make referral to [facility]  [] Make referral to Spiritual Care Partner  [] No future visits requested        [] Contact Spiritual Care for further referrals     Comments: Rounding in Emergency Room: Jessy Guaman was lying on a gurney waiting for treatment. He shared that he was having pain and was very tired. Collaborated with RN about patients. No spiritual needs noted. Provided words of comfort and support, ministry of presence, empathic listening, hospitality (warm blankets) and Intro to spiritual care. Visited by: Mary Jane Magana.  Melinda Wallace., [facility]   paging Service [phone] ([phone])

## 2023-04-23 DIAGNOSIS — R14.0 BLOATING: Primary | ICD-10-CM

## 2023-04-24 DIAGNOSIS — R14.0 BLOATING: Primary | ICD-10-CM

## 2024-06-04 NOTE — ED NOTES
Specialty Pharmacy    [employer] Starter Pack    Coverage has been approved for this patient's medication from 04.16.24-05.16.25.     This patient has scheduled their medication delivery with  Specialty Pharmacy.      They should receive their medication Thursday, 06.06.24.    Thank you,    Lyndsay King, [facility]  Pharmacy Support [facility]  Specialty Pharmacy  [address] [place] [address], [id number]  T: [phone]  F: [phone]  [email or web address]       Patient provided with discharge paperwork, PIV discontinued; ambulatory to waiting room. [employer] called per patient request, to wait in waiting room.

## 2024-07-29 NOTE — ED NOTES
1:59 PM  Change of shift to Dr. Padmini Rivera. Care of patient taken over from Dr. Hector Hernández; H&P reviewed, handoff complete. Awaiting labs/imaging/consultant. CONSULT NOTE:  5:35 PM Sara Walters MD spoke with Dr. Sebastian Germain, Consult for Hospitalist.  Discussed available diagnostic tests and clinical findings. Provider is in agreement with care plans as outlined.  Provider will evaluate the patient for admission to the hospital. Male

## 2025-01-18 ENCOUNTER — APPOINTMENT (OUTPATIENT)
Facility: HOSPITAL | Age: 40
DRG: 468 | End: 2025-01-18
Payer: MEDICAID

## 2025-01-18 ENCOUNTER — HOSPITAL ENCOUNTER (INPATIENT)
Facility: HOSPITAL | Age: 40
LOS: 2 days | Discharge: HOME | DRG: 468 | End: 2025-01-20
Attending: STUDENT IN AN ORGANIZED HEALTH CARE EDUCATION/TRAINING PROGRAM | Admitting: INTERNAL MEDICINE
Payer: MEDICAID

## 2025-01-18 DIAGNOSIS — R11.2 NAUSEA AND VOMITING, UNSPECIFIED VOMITING TYPE: ICD-10-CM

## 2025-01-18 DIAGNOSIS — R63.1 POLYDIPSIA: ICD-10-CM

## 2025-01-18 DIAGNOSIS — R00.0 TACHYCARDIA: Primary | ICD-10-CM

## 2025-01-18 DIAGNOSIS — R10.84 GENERALIZED ABDOMINAL PAIN: ICD-10-CM

## 2025-01-18 DIAGNOSIS — R33.8 ACUTE URINARY RETENTION: ICD-10-CM

## 2025-01-18 LAB
ALBUMIN SERPL-MCNC: 4.3 G/DL (ref 3.5–5)
ALBUMIN SERPL-MCNC: 4.5 G/DL (ref 3.5–5)
ALBUMIN/GLOB SERPL: 0.9 (ref 1.1–2.2)
ALBUMIN/GLOB SERPL: 1 (ref 1.1–2.2)
ALP SERPL-CCNC: 106 U/L (ref 45–117)
ALP SERPL-CCNC: 109 U/L (ref 45–117)
ALT SERPL-CCNC: 33 U/L (ref 12–78)
ALT SERPL-CCNC: 40 U/L (ref 12–78)
AMPHET UR QL SCN: NEGATIVE
ANION GAP SERPL CALC-SCNC: 12 MMOL/L (ref 2–12)
ANION GAP SERPL CALC-SCNC: 4 MMOL/L (ref 2–12)
APPEARANCE UR: CLEAR
APPEARANCE UR: CLEAR
AST SERPL-CCNC: 14 U/L (ref 15–37)
AST SERPL-CCNC: 38 U/L (ref 15–37)
BACTERIA URNS QL MICRO: NEGATIVE /HPF
BACTERIA URNS QL MICRO: NEGATIVE /HPF
BARBITURATES UR QL SCN: NEGATIVE
BASOPHILS # BLD: 0.03 K/UL (ref 0–0.1)
BASOPHILS # BLD: 0.05 K/UL (ref 0–0.1)
BASOPHILS NFR BLD: 0.3 % (ref 0–1)
BASOPHILS NFR BLD: 0.7 % (ref 0–1)
BENZODIAZ UR QL: NEGATIVE
BILIRUB SERPL-MCNC: 0.4 MG/DL (ref 0.2–1)
BILIRUB SERPL-MCNC: 0.5 MG/DL (ref 0.2–1)
BILIRUB UR QL: NEGATIVE
BILIRUB UR QL: NEGATIVE
BUN SERPL-MCNC: 7 MG/DL (ref 6–20)
BUN SERPL-MCNC: 9 MG/DL (ref 6–20)
BUN/CREAT SERPL: 6 (ref 12–20)
BUN/CREAT SERPL: 7 (ref 12–20)
CALCIUM SERPL-MCNC: 10.4 MG/DL (ref 8.5–10.1)
CALCIUM SERPL-MCNC: 9.9 MG/DL (ref 8.5–10.1)
CANNABINOIDS UR QL SCN: POSITIVE
CHLORIDE SERPL-SCNC: 102 MMOL/L (ref 97–108)
CHLORIDE SERPL-SCNC: 114 MMOL/L (ref 97–108)
CO2 SERPL-SCNC: 21 MMOL/L (ref 21–32)
CO2 SERPL-SCNC: 23 MMOL/L (ref 21–32)
COCAINE UR QL SCN: NEGATIVE
COLOR UR: ABNORMAL
COLOR UR: ABNORMAL
COMMENT:: NORMAL
CREAT SERPL-MCNC: 1.12 MG/DL (ref 0.7–1.3)
CREAT SERPL-MCNC: 1.33 MG/DL (ref 0.7–1.3)
DIFFERENTIAL METHOD BLD: ABNORMAL
DIFFERENTIAL METHOD BLD: ABNORMAL
EKG ATRIAL RATE: 96 BPM
EKG DIAGNOSIS: NORMAL
EKG P AXIS: 70 DEGREES
EKG P-R INTERVAL: 148 MS
EKG Q-T INTERVAL: 358 MS
EKG QRS DURATION: 96 MS
EKG QTC CALCULATION (BAZETT): 452 MS
EKG R AXIS: 44 DEGREES
EKG T AXIS: 66 DEGREES
EKG VENTRICULAR RATE: 96 BPM
EOSINOPHIL # BLD: 0 K/UL (ref 0–0.4)
EOSINOPHIL # BLD: 0.06 K/UL (ref 0–0.4)
EOSINOPHIL NFR BLD: 0 % (ref 0–7)
EOSINOPHIL NFR BLD: 0.8 % (ref 0–7)
EPITH CASTS URNS QL MICRO: ABNORMAL /LPF
EPITH CASTS URNS QL MICRO: ABNORMAL /LPF
ERYTHROCYTE [DISTWIDTH] IN BLOOD BY AUTOMATED COUNT: 14.6 % (ref 11.5–14.5)
ERYTHROCYTE [DISTWIDTH] IN BLOOD BY AUTOMATED COUNT: 14.7 % (ref 11.5–14.5)
GLOBULIN SER CALC-MCNC: 4.5 G/DL (ref 2–4)
GLOBULIN SER CALC-MCNC: 4.8 G/DL (ref 2–4)
GLUCOSE SERPL-MCNC: 122 MG/DL (ref 65–100)
GLUCOSE SERPL-MCNC: 143 MG/DL (ref 65–100)
GLUCOSE UR STRIP.AUTO-MCNC: NEGATIVE MG/DL
GLUCOSE UR STRIP.AUTO-MCNC: NEGATIVE MG/DL
HCT VFR BLD AUTO: 47.3 % (ref 36.6–50.3)
HCT VFR BLD AUTO: 49.5 % (ref 36.6–50.3)
HGB BLD-MCNC: 16.2 G/DL (ref 12.1–17)
HGB BLD-MCNC: 17.3 G/DL (ref 12.1–17)
HGB UR QL STRIP: ABNORMAL
HGB UR QL STRIP: NEGATIVE
HYALINE CASTS URNS QL MICRO: ABNORMAL /LPF (ref 0–5)
HYALINE CASTS URNS QL MICRO: ABNORMAL /LPF (ref 0–5)
IMM GRANULOCYTES # BLD AUTO: 0.01 K/UL (ref 0–0.04)
IMM GRANULOCYTES # BLD AUTO: 0.04 K/UL (ref 0–0.04)
IMM GRANULOCYTES NFR BLD AUTO: 0.1 % (ref 0–0.5)
IMM GRANULOCYTES NFR BLD AUTO: 0.3 % (ref 0–0.5)
KETONES UR QL STRIP.AUTO: NEGATIVE MG/DL
KETONES UR QL STRIP.AUTO: NEGATIVE MG/DL
LEUKOCYTE ESTERASE UR QL STRIP.AUTO: NEGATIVE
LEUKOCYTE ESTERASE UR QL STRIP.AUTO: NEGATIVE
LIPASE SERPL-CCNC: 107 U/L (ref 13–75)
LYMPHOCYTES # BLD: 1.35 K/UL (ref 0.8–3.5)
LYMPHOCYTES # BLD: 1.43 K/UL (ref 0.8–3.5)
LYMPHOCYTES NFR BLD: 11.6 % (ref 12–49)
LYMPHOCYTES NFR BLD: 20.1 % (ref 12–49)
Lab: ABNORMAL
MAGNESIUM SERPL-MCNC: 2.1 MG/DL (ref 1.6–2.4)
MCH RBC QN AUTO: 30.4 PG (ref 26–34)
MCH RBC QN AUTO: 30.7 PG (ref 26–34)
MCHC RBC AUTO-ENTMCNC: 34.2 G/DL (ref 30–36.5)
MCHC RBC AUTO-ENTMCNC: 34.9 G/DL (ref 30–36.5)
MCV RBC AUTO: 87 FL (ref 80–99)
MCV RBC AUTO: 89.6 FL (ref 80–99)
METHADONE UR QL: NEGATIVE
MONOCYTES # BLD: 0.38 K/UL (ref 0–1)
MONOCYTES # BLD: 1.12 K/UL (ref 0–1)
MONOCYTES NFR BLD: 5.4 % (ref 5–13)
MONOCYTES NFR BLD: 9.6 % (ref 5–13)
NEUTS SEG # BLD: 5.17 K/UL (ref 1.8–8)
NEUTS SEG # BLD: 9.09 K/UL (ref 1.8–8)
NEUTS SEG NFR BLD: 72.9 % (ref 32–75)
NEUTS SEG NFR BLD: 78.2 % (ref 32–75)
NITRITE UR QL STRIP.AUTO: NEGATIVE
NITRITE UR QL STRIP.AUTO: NEGATIVE
NRBC # BLD: 0 K/UL (ref 0–0.01)
NRBC # BLD: 0 K/UL (ref 0–0.01)
NRBC BLD-RTO: 0 PER 100 WBC
NRBC BLD-RTO: 0 PER 100 WBC
OPIATES UR QL: NEGATIVE
OSMOLALITY UR: 89 MOSM/KG H2O
PCP UR QL: NEGATIVE
PH UR STRIP: 8 (ref 5–8)
PH UR STRIP: 8 (ref 5–8)
PHOSPHATE SERPL-MCNC: 2.6 MG/DL (ref 2.6–4.7)
PLATELET # BLD AUTO: 371 K/UL (ref 150–400)
PLATELET # BLD AUTO: 395 K/UL (ref 150–400)
PMV BLD AUTO: 9.6 FL (ref 8.9–12.9)
PMV BLD AUTO: 9.7 FL (ref 8.9–12.9)
POTASSIUM SERPL-SCNC: 3.6 MMOL/L (ref 3.5–5.1)
POTASSIUM SERPL-SCNC: 4.5 MMOL/L (ref 3.5–5.1)
PROT SERPL-MCNC: 8.8 G/DL (ref 6.4–8.2)
PROT SERPL-MCNC: 9.3 G/DL (ref 6.4–8.2)
PROT UR STRIP-MCNC: 30 MG/DL
PROT UR STRIP-MCNC: ABNORMAL MG/DL
RBC # BLD AUTO: 5.28 M/UL (ref 4.1–5.7)
RBC # BLD AUTO: 5.69 M/UL (ref 4.1–5.7)
RBC #/AREA URNS HPF: ABNORMAL /HPF (ref 0–5)
RBC #/AREA URNS HPF: ABNORMAL /HPF (ref 0–5)
SODIUM SERPL-SCNC: 135 MMOL/L (ref 136–145)
SODIUM SERPL-SCNC: 141 MMOL/L (ref 136–145)
SODIUM UR-SCNC: 17 MMOL/L
SP GR UR REFRACTOMETRY: 1.01 (ref 1–1.03)
SP GR UR REFRACTOMETRY: <1.005 (ref 1–1.03)
SPECIMEN HOLD: NORMAL
TROPONIN I SERPL HS-MCNC: 4 NG/L (ref 0–76)
TROPONIN I SERPL HS-MCNC: 4 NG/L (ref 0–76)
TROPONIN I SERPL HS-MCNC: 5 NG/L (ref 0–76)
URINE CULTURE IF INDICATED: ABNORMAL
URINE CULTURE IF INDICATED: ABNORMAL
UROBILINOGEN UR QL STRIP.AUTO: 0.2 EU/DL (ref 0.2–1)
UROBILINOGEN UR QL STRIP.AUTO: 0.2 EU/DL (ref 0.2–1)
WBC # BLD AUTO: 11.6 K/UL (ref 4.1–11.1)
WBC # BLD AUTO: 7.1 K/UL (ref 4.1–11.1)
WBC URNS QL MICRO: ABNORMAL /HPF (ref 0–4)
WBC URNS QL MICRO: ABNORMAL /HPF (ref 0–4)

## 2025-01-18 PROCEDURE — 51702 INSERT TEMP BLADDER CATH: CPT

## 2025-01-18 PROCEDURE — 6360000004 HC RX CONTRAST MEDICATION: Performed by: STUDENT IN AN ORGANIZED HEALTH CARE EDUCATION/TRAINING PROGRAM

## 2025-01-18 PROCEDURE — 80307 DRUG TEST PRSMV CHEM ANLYZR: CPT

## 2025-01-18 PROCEDURE — 74177 CT ABD & PELVIS W/CONTRAST: CPT

## 2025-01-18 PROCEDURE — 71045 X-RAY EXAM CHEST 1 VIEW: CPT

## 2025-01-18 PROCEDURE — 99285 EMERGENCY DEPT VISIT HI MDM: CPT

## 2025-01-18 PROCEDURE — 84484 ASSAY OF TROPONIN QUANT: CPT

## 2025-01-18 PROCEDURE — 6370000000 HC RX 637 (ALT 250 FOR IP): Performed by: INTERNAL MEDICINE

## 2025-01-18 PROCEDURE — 93005 ELECTROCARDIOGRAM TRACING: CPT | Performed by: STUDENT IN AN ORGANIZED HEALTH CARE EDUCATION/TRAINING PROGRAM

## 2025-01-18 PROCEDURE — 6360000002 HC RX W HCPCS: Performed by: STUDENT IN AN ORGANIZED HEALTH CARE EDUCATION/TRAINING PROGRAM

## 2025-01-18 PROCEDURE — 83735 ASSAY OF MAGNESIUM: CPT

## 2025-01-18 PROCEDURE — 84100 ASSAY OF PHOSPHORUS: CPT

## 2025-01-18 PROCEDURE — 51798 US URINE CAPACITY MEASURE: CPT

## 2025-01-18 PROCEDURE — 84300 ASSAY OF URINE SODIUM: CPT

## 2025-01-18 PROCEDURE — 70450 CT HEAD/BRAIN W/O DYE: CPT

## 2025-01-18 PROCEDURE — 83690 ASSAY OF LIPASE: CPT

## 2025-01-18 PROCEDURE — 36415 COLL VENOUS BLD VENIPUNCTURE: CPT

## 2025-01-18 PROCEDURE — 2580000003 HC RX 258: Performed by: INTERNAL MEDICINE

## 2025-01-18 PROCEDURE — 6360000002 HC RX W HCPCS: Performed by: INTERNAL MEDICINE

## 2025-01-18 PROCEDURE — 71275 CT ANGIOGRAPHY CHEST: CPT

## 2025-01-18 PROCEDURE — 2580000003 HC RX 258: Performed by: STUDENT IN AN ORGANIZED HEALTH CARE EDUCATION/TRAINING PROGRAM

## 2025-01-18 PROCEDURE — 96375 TX/PRO/DX INJ NEW DRUG ADDON: CPT

## 2025-01-18 PROCEDURE — 6360000004 HC RX CONTRAST MEDICATION: Performed by: INTERNAL MEDICINE

## 2025-01-18 PROCEDURE — 85025 COMPLETE CBC W/AUTO DIFF WBC: CPT

## 2025-01-18 PROCEDURE — 6370000000 HC RX 637 (ALT 250 FOR IP)

## 2025-01-18 PROCEDURE — 74018 RADEX ABDOMEN 1 VIEW: CPT

## 2025-01-18 PROCEDURE — 80053 COMPREHEN METABOLIC PANEL: CPT

## 2025-01-18 PROCEDURE — 2060000000 HC ICU INTERMEDIATE R&B

## 2025-01-18 PROCEDURE — 96374 THER/PROPH/DIAG INJ IV PUSH: CPT

## 2025-01-18 PROCEDURE — 81001 URINALYSIS AUTO W/SCOPE: CPT

## 2025-01-18 PROCEDURE — 83935 ASSAY OF URINE OSMOLALITY: CPT

## 2025-01-18 RX ORDER — SODIUM CHLORIDE 9 MG/ML
INJECTION, SOLUTION INTRAVENOUS PRN
Status: DISCONTINUED | OUTPATIENT
Start: 2025-01-18 | End: 2025-01-20 | Stop reason: HOSPADM

## 2025-01-18 RX ORDER — EPINEPHRINE 1 MG/ML
0.5 INJECTION, SOLUTION, CONCENTRATE INTRAVENOUS ONCE
Status: DISCONTINUED | OUTPATIENT
Start: 2025-01-18 | End: 2025-01-18

## 2025-01-18 RX ORDER — LIDOCAINE HYDROCHLORIDE 20 MG/ML
JELLY TOPICAL ONCE
Status: DISCONTINUED | OUTPATIENT
Start: 2025-01-18 | End: 2025-01-20 | Stop reason: HOSPADM

## 2025-01-18 RX ORDER — ALBUTEROL SULFATE 0.83 MG/ML
2.5 SOLUTION RESPIRATORY (INHALATION) EVERY 6 HOURS PRN
Status: DISCONTINUED | OUTPATIENT
Start: 2025-01-18 | End: 2025-01-20 | Stop reason: HOSPADM

## 2025-01-18 RX ORDER — LORAZEPAM 2 MG/ML
1 INJECTION INTRAMUSCULAR EVERY 6 HOURS PRN
Status: DISCONTINUED | OUTPATIENT
Start: 2025-01-18 | End: 2025-01-20 | Stop reason: HOSPADM

## 2025-01-18 RX ORDER — HYDRALAZINE HYDROCHLORIDE 20 MG/ML
10 INJECTION INTRAMUSCULAR; INTRAVENOUS EVERY 6 HOURS PRN
Status: DISCONTINUED | OUTPATIENT
Start: 2025-01-18 | End: 2025-01-18

## 2025-01-18 RX ORDER — ALBUTEROL SULFATE 90 UG/1
2 INHALANT RESPIRATORY (INHALATION) EVERY 6 HOURS PRN
Status: DISCONTINUED | OUTPATIENT
Start: 2025-01-18 | End: 2025-01-18 | Stop reason: SDUPTHER

## 2025-01-18 RX ORDER — DROPERIDOL 2.5 MG/ML
1.25 INJECTION, SOLUTION INTRAMUSCULAR; INTRAVENOUS ONCE
Status: COMPLETED | OUTPATIENT
Start: 2025-01-18 | End: 2025-01-18

## 2025-01-18 RX ORDER — POLYETHYLENE GLYCOL 3350 17 G/17G
17 POWDER, FOR SOLUTION ORAL DAILY PRN
Status: DISCONTINUED | OUTPATIENT
Start: 2025-01-18 | End: 2025-01-20 | Stop reason: HOSPADM

## 2025-01-18 RX ORDER — OXYCODONE HYDROCHLORIDE 5 MG/1
5 TABLET ORAL EVERY 4 HOURS PRN
Status: DISCONTINUED | OUTPATIENT
Start: 2025-01-18 | End: 2025-01-18

## 2025-01-18 RX ORDER — MORPHINE SULFATE 4 MG/ML
6 INJECTION, SOLUTION INTRAMUSCULAR; INTRAVENOUS
Status: DISCONTINUED | OUTPATIENT
Start: 2025-01-18 | End: 2025-01-18

## 2025-01-18 RX ORDER — ACETAMINOPHEN 650 MG/1
650 SUPPOSITORY RECTAL EVERY 6 HOURS PRN
Status: DISCONTINUED | OUTPATIENT
Start: 2025-01-18 | End: 2025-01-20 | Stop reason: HOSPADM

## 2025-01-18 RX ORDER — MAGNESIUM SULFATE IN WATER 40 MG/ML
2000 INJECTION, SOLUTION INTRAVENOUS PRN
Status: DISCONTINUED | OUTPATIENT
Start: 2025-01-18 | End: 2025-01-20 | Stop reason: HOSPADM

## 2025-01-18 RX ORDER — DICYCLOMINE HYDROCHLORIDE 10 MG/1
10 CAPSULE ORAL 4 TIMES DAILY PRN
Status: DISCONTINUED | OUTPATIENT
Start: 2025-01-18 | End: 2025-01-20 | Stop reason: HOSPADM

## 2025-01-18 RX ORDER — IOPAMIDOL 755 MG/ML
100 INJECTION, SOLUTION INTRAVASCULAR
Status: COMPLETED | OUTPATIENT
Start: 2025-01-18 | End: 2025-01-18

## 2025-01-18 RX ORDER — HYDROMORPHONE HYDROCHLORIDE 1 MG/ML
1 INJECTION, SOLUTION INTRAMUSCULAR; INTRAVENOUS; SUBCUTANEOUS
Status: DISCONTINUED | OUTPATIENT
Start: 2025-01-18 | End: 2025-01-20 | Stop reason: HOSPADM

## 2025-01-18 RX ORDER — ENOXAPARIN SODIUM 100 MG/ML
30 INJECTION SUBCUTANEOUS 2 TIMES DAILY
Status: DISCONTINUED | OUTPATIENT
Start: 2025-01-19 | End: 2025-01-20 | Stop reason: HOSPADM

## 2025-01-18 RX ORDER — SIMETHICONE 80 MG
80 TABLET,CHEWABLE ORAL EVERY 6 HOURS PRN
Status: DISCONTINUED | OUTPATIENT
Start: 2025-01-18 | End: 2025-01-20 | Stop reason: HOSPADM

## 2025-01-18 RX ORDER — DEXAMETHASONE SODIUM PHOSPHATE 4 MG/ML
12 INJECTION, SOLUTION INTRA-ARTICULAR; INTRALESIONAL; INTRAMUSCULAR; INTRAVENOUS; SOFT TISSUE ONCE
Status: DISCONTINUED | OUTPATIENT
Start: 2025-01-18 | End: 2025-01-18

## 2025-01-18 RX ORDER — ONDANSETRON 2 MG/ML
4 INJECTION INTRAMUSCULAR; INTRAVENOUS EVERY 6 HOURS PRN
Status: DISCONTINUED | OUTPATIENT
Start: 2025-01-18 | End: 2025-01-20 | Stop reason: HOSPADM

## 2025-01-18 RX ORDER — METOPROLOL SUCCINATE 50 MG/1
100 TABLET, EXTENDED RELEASE ORAL DAILY
Status: DISCONTINUED | OUTPATIENT
Start: 2025-01-18 | End: 2025-01-18

## 2025-01-18 RX ORDER — SODIUM CHLORIDE 0.9 % (FLUSH) 0.9 %
5-40 SYRINGE (ML) INJECTION PRN
Status: DISCONTINUED | OUTPATIENT
Start: 2025-01-18 | End: 2025-01-20 | Stop reason: HOSPADM

## 2025-01-18 RX ORDER — POTASSIUM CHLORIDE 7.45 MG/ML
10 INJECTION INTRAVENOUS PRN
Status: DISCONTINUED | OUTPATIENT
Start: 2025-01-18 | End: 2025-01-20 | Stop reason: HOSPADM

## 2025-01-18 RX ORDER — HYDROMORPHONE HYDROCHLORIDE 1 MG/ML
1 INJECTION, SOLUTION INTRAMUSCULAR; INTRAVENOUS; SUBCUTANEOUS EVERY 4 HOURS PRN
Status: DISCONTINUED | OUTPATIENT
Start: 2025-01-18 | End: 2025-01-18

## 2025-01-18 RX ORDER — SODIUM CHLORIDE 9 MG/ML
INJECTION, SOLUTION INTRAVENOUS CONTINUOUS
Status: DISCONTINUED | OUTPATIENT
Start: 2025-01-18 | End: 2025-01-20 | Stop reason: HOSPADM

## 2025-01-18 RX ORDER — DIPHENHYDRAMINE HYDROCHLORIDE 50 MG/ML
25 INJECTION INTRAMUSCULAR; INTRAVENOUS ONCE
Status: COMPLETED | OUTPATIENT
Start: 2025-01-18 | End: 2025-01-18

## 2025-01-18 RX ORDER — ACETAMINOPHEN 325 MG/1
650 TABLET ORAL EVERY 6 HOURS PRN
Status: DISCONTINUED | OUTPATIENT
Start: 2025-01-18 | End: 2025-01-20 | Stop reason: HOSPADM

## 2025-01-18 RX ORDER — ONDANSETRON 4 MG/1
4 TABLET, ORALLY DISINTEGRATING ORAL EVERY 8 HOURS PRN
Status: DISCONTINUED | OUTPATIENT
Start: 2025-01-18 | End: 2025-01-20 | Stop reason: HOSPADM

## 2025-01-18 RX ORDER — AMLODIPINE BESYLATE 5 MG/1
10 TABLET ORAL DAILY
Status: DISCONTINUED | OUTPATIENT
Start: 2025-01-18 | End: 2025-01-18

## 2025-01-18 RX ORDER — POTASSIUM CHLORIDE 750 MG/1
40 TABLET, EXTENDED RELEASE ORAL PRN
Status: DISCONTINUED | OUTPATIENT
Start: 2025-01-18 | End: 2025-01-20 | Stop reason: HOSPADM

## 2025-01-18 RX ORDER — NALOXONE HYDROCHLORIDE 0.4 MG/ML
0.4 INJECTION, SOLUTION INTRAMUSCULAR; INTRAVENOUS; SUBCUTANEOUS PRN
Status: DISCONTINUED | OUTPATIENT
Start: 2025-01-18 | End: 2025-01-20 | Stop reason: HOSPADM

## 2025-01-18 RX ORDER — OXYCODONE HYDROCHLORIDE 5 MG/1
10 TABLET ORAL EVERY 4 HOURS PRN
Status: DISCONTINUED | OUTPATIENT
Start: 2025-01-18 | End: 2025-01-20 | Stop reason: HOSPADM

## 2025-01-18 RX ORDER — SODIUM CHLORIDE 0.9 % (FLUSH) 0.9 %
5-40 SYRINGE (ML) INJECTION EVERY 12 HOURS SCHEDULED
Status: DISCONTINUED | OUTPATIENT
Start: 2025-01-18 | End: 2025-01-20 | Stop reason: HOSPADM

## 2025-01-18 RX ORDER — ONDANSETRON 2 MG/ML
4 INJECTION INTRAMUSCULAR; INTRAVENOUS EVERY 6 HOURS PRN
Status: DISCONTINUED | OUTPATIENT
Start: 2025-01-18 | End: 2025-01-20 | Stop reason: SDUPTHER

## 2025-01-18 RX ADMIN — HYDROMORPHONE HYDROCHLORIDE 1 MG: 1 INJECTION, SOLUTION INTRAMUSCULAR; INTRAVENOUS; SUBCUTANEOUS at 13:13

## 2025-01-18 RX ADMIN — HYDROMORPHONE HYDROCHLORIDE 1 MG: 1 INJECTION, SOLUTION INTRAMUSCULAR; INTRAVENOUS; SUBCUTANEOUS at 22:22

## 2025-01-18 RX ADMIN — LORAZEPAM 1 MG: 2 INJECTION INTRAMUSCULAR; INTRAVENOUS at 17:43

## 2025-01-18 RX ADMIN — HYDROMORPHONE HYDROCHLORIDE 1 MG: 1 INJECTION, SOLUTION INTRAMUSCULAR; INTRAVENOUS; SUBCUTANEOUS at 20:34

## 2025-01-18 RX ADMIN — SODIUM CHLORIDE: 9 INJECTION, SOLUTION INTRAVENOUS at 17:29

## 2025-01-18 RX ADMIN — OXYCODONE 5 MG: 5 TABLET ORAL at 18:40

## 2025-01-18 RX ADMIN — OXYCODONE 5 MG: 5 TABLET ORAL at 14:35

## 2025-01-18 RX ADMIN — SIMETHICONE 80 MG: 80 TABLET, CHEWABLE ORAL at 23:47

## 2025-01-18 RX ADMIN — IOPAMIDOL 100 ML: 755 INJECTION, SOLUTION INTRAVENOUS at 18:19

## 2025-01-18 RX ADMIN — NICARDIPINE HYDROCHLORIDE 5 MG/HR: 0.1 INJECTION, SOLUTION INTRAVENOUS at 21:42

## 2025-01-18 RX ADMIN — NICARDIPINE HYDROCHLORIDE 7.5 MG/HR: 0.1 INJECTION, SOLUTION INTRAVENOUS at 18:59

## 2025-01-18 RX ADMIN — AMLODIPINE BESYLATE 10 MG: 5 TABLET ORAL at 14:34

## 2025-01-18 RX ADMIN — NICARDIPINE HYDROCHLORIDE 5 MG/HR: 0.1 INJECTION, SOLUTION INTRAVENOUS at 17:19

## 2025-01-18 RX ADMIN — IOPAMIDOL 100 ML: 755 INJECTION, SOLUTION INTRAVENOUS at 08:43

## 2025-01-18 RX ADMIN — ACETAMINOPHEN 650 MG: 325 TABLET ORAL at 19:02

## 2025-01-18 RX ADMIN — NICARDIPINE HYDROCHLORIDE 5 MG/HR: 0.1 INJECTION, SOLUTION INTRAVENOUS at 17:29

## 2025-01-18 RX ADMIN — DIPHENHYDRAMINE HYDROCHLORIDE 25 MG: 50 INJECTION INTRAMUSCULAR; INTRAVENOUS at 08:29

## 2025-01-18 RX ADMIN — METOPROLOL SUCCINATE 100 MG: 25 TABLET, FILM COATED, EXTENDED RELEASE ORAL at 14:33

## 2025-01-18 RX ADMIN — SODIUM CHLORIDE, PRESERVATIVE FREE 40 MG: 5 INJECTION INTRAVENOUS at 08:30

## 2025-01-18 RX ADMIN — HYDROMORPHONE HYDROCHLORIDE 1 MG: 1 INJECTION, SOLUTION INTRAMUSCULAR; INTRAVENOUS; SUBCUTANEOUS at 17:12

## 2025-01-18 RX ADMIN — DROPERIDOL 1.25 MG: 2.5 INJECTION, SOLUTION INTRAMUSCULAR; INTRAVENOUS at 07:31

## 2025-01-18 ASSESSMENT — PAIN SCALES - GENERAL
PAINLEVEL_OUTOF10: 10
PAINLEVEL_OUTOF10: 8
PAINLEVEL_OUTOF10: 6
PAINLEVEL_OUTOF10: 10
PAINLEVEL_OUTOF10: 9
PAINLEVEL_OUTOF10: 10
PAINLEVEL_OUTOF10: 10

## 2025-01-18 ASSESSMENT — PAIN DESCRIPTION - ORIENTATION
ORIENTATION: LEFT
ORIENTATION: UPPER;LOWER
ORIENTATION: LOWER;UPPER
ORIENTATION: UPPER;LOWER
ORIENTATION: MID
ORIENTATION: MID;LOWER;UPPER
ORIENTATION: INNER
ORIENTATION: INNER

## 2025-01-18 ASSESSMENT — PAIN DESCRIPTION - DESCRIPTORS
DESCRIPTORS: CRAMPING
DESCRIPTORS: CRAMPING
DESCRIPTORS: SHARP
DESCRIPTORS: ACHING
DESCRIPTORS: SHARP
DESCRIPTORS: CRAMPING
DESCRIPTORS: SORE
DESCRIPTORS: ACHING;DISCOMFORT

## 2025-01-18 ASSESSMENT — PAIN DESCRIPTION - LOCATION
LOCATION: CHEST
LOCATION: CHEST
LOCATION: ABDOMEN
LOCATION: OTHER (COMMENT)
LOCATION: ABDOMEN
LOCATION: PENIS
LOCATION: ABDOMEN
LOCATION: ABDOMEN;GROIN
LOCATION: ABDOMEN

## 2025-01-18 ASSESSMENT — PAIN - FUNCTIONAL ASSESSMENT
PAIN_FUNCTIONAL_ASSESSMENT: PREVENTS OR INTERFERES WITH MANY ACTIVE NOT PASSIVE ACTIVITIES
PAIN_FUNCTIONAL_ASSESSMENT: PREVENTS OR INTERFERES WITH ALL ACTIVE AND SOME PASSIVE ACTIVITIES
PAIN_FUNCTIONAL_ASSESSMENT: PREVENTS OR INTERFERES SOME ACTIVE ACTIVITIES AND ADLS
PAIN_FUNCTIONAL_ASSESSMENT: 0-10
PAIN_FUNCTIONAL_ASSESSMENT: ACTIVITIES ARE NOT PREVENTED

## 2025-01-18 ASSESSMENT — PAIN DESCRIPTION - ONSET
ONSET: ON-GOING
ONSET: ON-GOING

## 2025-01-18 ASSESSMENT — PAIN DESCRIPTION - PAIN TYPE
TYPE: ACUTE PAIN

## 2025-01-18 ASSESSMENT — PAIN DESCRIPTION - FREQUENCY
FREQUENCY: CONTINUOUS
FREQUENCY: CONTINUOUS

## 2025-01-18 ASSESSMENT — PAIN DESCRIPTION - DIRECTION: RADIATING_TOWARDS: ABD

## 2025-01-18 NOTE — PROGRESS NOTES
1700 - pt transferred to our floor.     1715 - upon arrival to pt room, he is groaning and rocking in pain, report 10/10 around penis. Attempted to manipulate toledo and balloon without relief. Pt penis slightly swollen, felt as if toldeo coiling inside. Pulled toledo with primary RN Sreekanth. Pt reporting relief.     Pt now complaining of 10/10 pain in abd. PRN dilaudid given per primary, pt reporting no relief, stating it is getting worse. Abd distended and tender to touch.     Primary in touch with MD. Agee, [facility] to assist at bedside.     cardene gtt and NS started per orders. Urology consult called. Bladder scanned for 250 mL    1750 - spoke with Dr. Naqvi' nurse (on call urology). Orders to bladder scan again in 6 hours per protocol and straight cath or place coude toledo if retaining (per pt tolerance).    1800 - pt transported to CT with RRT and primary RN.

## 2025-01-18 NOTE — ED NOTES
0815: Patient c/o chest/abd pain. RN notified MD Fernandez. 1 time dose of Morphine was ordered.  0820: RN attempted to administer Morphine. Patient stated he was allergic. RN asked reaction and patient stated it makes him anxious. RN notified MD Fernandez.  0822: RN and MD Fernandez at bedside. Patient tongue appears swollen and garbled speech. MD Fernandez ordered medications for allergic reaction.  0825: MD back at bedside. Patients tongue not swollen. Speech back to normal. Patient appears to have pushed tongue to teeth to increase garbled speech. Airway intact.   0830: RN administered Protonix. CT at bedside to take patient to scan. Patient refused and stated he needs sleep. RN educated patient on going to CT scan and can rest after. Patient stated \"I have not slept since I woke up this morning.\" MD Fernandez aware.  0840: Patient to CT.

## 2025-01-18 NOTE — ED NOTES
Pt found out of bed drinking water in copious amounts from the sink. Advised patient that he should not be out of bed and drinking water, that he is currently over hydrated. Educated the patient about the dangers of having too much water, no evidence of learning. Informed MD of how patient was found. Pt given small cup of water as MD advised that he could have sips, patient begging for a larger cup of water. Attempted to re-educate patient without success.

## 2025-01-18 NOTE — ED NOTES
ED TO INPATIENT SBAR HANDOFF    Patient Name: Howard Culp   :  1985  39 y.o.   MRN:  [id number]  ED Room #:  ER10/10     Situation  Code Status: Full Code   Allergies: Patient has no known allergies.  Weight: No data found.    Arrived from: home    Chief Complaint:   Chief Complaint   Patient presents with    Chest Pain    Vomiting       Hospital Problem/Diagnosis:  Principal Problem:    Acute urinary retention  Resolved Problems:    * No resolved hospital problems. *      Mobility: no current mobility problem   ED Fall Risk: Presents to emergency department  because of falls (Syncope, seizure, or loss of consciousness): No, Age > 70: No, Altered Mental Status, Intoxication with alcohol or substance confusion (Disorientation, impaired judgment, poor safety awaremess, or inability to follow instructions): No, Impaired Mobility: Ambulates or transfers with assistive devices or assistance; Unable to ambulate or transer.: No, Nursing Judgement: No   Fell in ED or prior to admission: no   Restraints: no     Sitter: no   Family/Caregiver Present: no    Neet to know social/safety information: Pt is becoming increasingly agitated stating that he needs he catheter bag off. RN messaged MD requesting medication for anxiety.     Background  History:   Past Medical History:   Diagnosis Date    Asthma     Bronchitis    Diabetes (HCC)     Gastroparesis     Hypertension     Psychiatric disorder     anxiety       Assessment    Abnormal Assessment Findings: hypertensive, polydipsia   Imaging:   CT ABDOMEN PELVIS W IV CONTRAST Additional Contrast? None   Final Result   1.  There is massive distention of the bladder.   2.  There is persistent, stable mild right-sided hydronephrosis with cortical   thinning and calyceal dilatation.   3.  Hepatic steatosis.   4.  Small hiatal hernia.      Electronically signed by BLANCA RAMOS CHEST PORTABLE   Final Result   No acute cardiopulmonary abnormalities.         Electronically

## 2025-01-18 NOTE — ED TRIAGE NOTES
Patient arrives via EMS from home cc of left side chest pain since last night and vomiting since this morning. Patient refused aspirin en route d/t it making his GERD worse. Patient does not have any cardiac history, does have a history of hypertension.

## 2025-01-18 NOTE — H&P
History and Physical    Date of Service:  1/18/2025  Primary Care Provider: Luis Eduardo Lott MD  Source of information: The patient and review of EMR    Chief Complaint: Chest Pain and Vomiting      History of Presenting Illness:   Howard Culp is a 39 y.o. male with past medical history significant for diabetes insipidus, asthma, hypertension, anxiety disorder presented at the emergency room with abdominal pain.  This started a couple of days ago and progressively getting worse.  The abdominal pain is diffuse in location, no radiation, no known aggravating or relieving factors, associated with nausea and vomiting, constant, dull ache and 6/10 in severity.  Patient is a very poor historian and very nonchalant.  He stated that read my record for the information.  He also reported chest pain and stated that it hurts all over.  He was last admitted to this hospital from 6/25/2022 to 6/26/2022, was admitted for evaluation and treatment of abdominal pain but left AMA.  CT of the abdomen and pelvis done on arrival at the emergency room showed massive distention of the bladder as well as right-sided hydronephrosis.  Moody catheter inserted in the emergency room and about 850 mL of urine was drained.  He was referred to the hospitalist service for admission.       REVIEW OF SYSTEMS:  REVIEW OF SYSTEMS:  HEAD, EYES, EARS, NOSE AND THROAT:  No headache.  No dizziness.  No blurring of vision.  No photophobia.  RESPIRATORY SYSTEM:  No shortness of breath.  No cough.  No hemoptysis.  CARDIOVASCULAR SYSTEM: Positive for chest pain.  No orthopnea.  No palpitations.  GASTROINTESTINAL SYSTEM: Positive for abdominal pain, nausea and vomiting No constipation.  GENITOURINARY SYSTEM:  No dysuria, no urgency, and no frequency.     All other systems are reviewed and they are negative.        Past Medical History:   Diagnosis Date    Asthma     Bronchitis    Diabetes (HCC)     Gastroparesis     Hypertension     Psychiatric
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
## 2025-01-18 NOTE — PROGRESS NOTES
PT admitted to this unit with extremely high blood pressures as they were in the ED.        1548 -  spoke to Yolande from [facility], she will notify Dr Naqvi, the on-call MD and pt will be seen, probably tomorrow.   1615 -  JORDAN Agee from [facility] was in to check on pt r/t elevated blood pressures.  Perfect serve message has been sent to Dr Dubose.  MD called united. Feels that if pt's pain is better controlled, his blood pressure would come down.  Pt has had both Dilaudid at 1313  and Roxicodone at 1435.  Too early for both medications at this time.  MD has placed prn hydralazine order for elevated blood pressures    1430 -  pt now has orders placed by MD to be started on Cardene drip which cannot be done on this unit. Pt will now have to be transferred to a higher level of care.  Charge nurse Anisa made aware.     1436 - pt assigned to room 449, attempting to call report to 4 west  1644 -  report called to Sreekanth on 4 west

## 2025-01-19 LAB
ALBUMIN SERPL-MCNC: 4.4 G/DL (ref 3.5–5)
ALBUMIN/GLOB SERPL: 1.1 (ref 1.1–2.2)
ALP SERPL-CCNC: 102 U/L (ref 45–117)
ALT SERPL-CCNC: 29 U/L (ref 12–78)
ANION GAP SERPL CALC-SCNC: 8 MMOL/L (ref 2–12)
ANION GAP SERPL CALC-SCNC: 8 MMOL/L (ref 2–12)
ANION GAP SERPL CALC-SCNC: 9 MMOL/L (ref 2–12)
AST SERPL-CCNC: 18 U/L (ref 15–37)
BASOPHILS # BLD: 0.02 K/UL (ref 0–0.1)
BASOPHILS NFR BLD: 0.2 % (ref 0–1)
BILIRUB SERPL-MCNC: 0.3 MG/DL (ref 0.2–1)
BUN SERPL-MCNC: 10 MG/DL (ref 6–20)
BUN SERPL-MCNC: 11 MG/DL (ref 6–20)
BUN SERPL-MCNC: 8 MG/DL (ref 6–20)
BUN/CREAT SERPL: 8 (ref 12–20)
BUN/CREAT SERPL: 8 (ref 12–20)
BUN/CREAT SERPL: 9 (ref 12–20)
CALCIUM SERPL-MCNC: 10.1 MG/DL (ref 8.5–10.1)
CALCIUM SERPL-MCNC: 9 MG/DL (ref 8.5–10.1)
CALCIUM SERPL-MCNC: 9.2 MG/DL (ref 8.5–10.1)
CHLORIDE SERPL-SCNC: 100 MMOL/L (ref 97–108)
CHLORIDE SERPL-SCNC: 96 MMOL/L (ref 97–108)
CHLORIDE SERPL-SCNC: 98 MMOL/L (ref 97–108)
CO2 SERPL-SCNC: 24 MMOL/L (ref 21–32)
CO2 SERPL-SCNC: 26 MMOL/L (ref 21–32)
CO2 SERPL-SCNC: 29 MMOL/L (ref 21–32)
CREAT SERPL-MCNC: 1.02 MG/DL (ref 0.7–1.3)
CREAT SERPL-MCNC: 1.16 MG/DL (ref 0.7–1.3)
CREAT SERPL-MCNC: 1.19 MG/DL (ref 0.7–1.3)
DIFFERENTIAL METHOD BLD: ABNORMAL
EOSINOPHIL # BLD: 0.03 K/UL (ref 0–0.4)
EOSINOPHIL NFR BLD: 0.3 % (ref 0–7)
ERYTHROCYTE [DISTWIDTH] IN BLOOD BY AUTOMATED COUNT: 15 % (ref 11.5–14.5)
EST. AVERAGE GLUCOSE BLD GHB EST-MCNC: 114 MG/DL
GLOBULIN SER CALC-MCNC: 4.1 G/DL (ref 2–4)
GLUCOSE SERPL-MCNC: 100 MG/DL (ref 65–100)
GLUCOSE SERPL-MCNC: 109 MG/DL (ref 65–100)
GLUCOSE SERPL-MCNC: 130 MG/DL (ref 65–100)
HBA1C MFR BLD: 5.6 % (ref 4–5.6)
HCT VFR BLD AUTO: 51.7 % (ref 36.6–50.3)
HGB BLD-MCNC: 17 G/DL (ref 12.1–17)
IMM GRANULOCYTES # BLD AUTO: 0.03 K/UL (ref 0–0.04)
IMM GRANULOCYTES NFR BLD AUTO: 0.3 % (ref 0–0.5)
LIPASE SERPL-CCNC: 65 U/L (ref 13–75)
LYMPHOCYTES # BLD: 1.46 K/UL (ref 0.8–3.5)
LYMPHOCYTES NFR BLD: 13.9 % (ref 12–49)
MAGNESIUM SERPL-MCNC: 2.4 MG/DL (ref 1.6–2.4)
MCH RBC QN AUTO: 30.1 PG (ref 26–34)
MCHC RBC AUTO-ENTMCNC: 32.9 G/DL (ref 30–36.5)
MCV RBC AUTO: 91.5 FL (ref 80–99)
MONOCYTES # BLD: 0.59 K/UL (ref 0–1)
MONOCYTES NFR BLD: 5.6 % (ref 5–13)
NEUTS SEG # BLD: 8.34 K/UL (ref 1.8–8)
NEUTS SEG NFR BLD: 79.7 % (ref 32–75)
NRBC # BLD: 0 K/UL (ref 0–0.01)
NRBC BLD-RTO: 0 PER 100 WBC
PHOSPHATE SERPL-MCNC: 3.4 MG/DL (ref 2.6–4.7)
PLATELET # BLD AUTO: 318 K/UL (ref 150–400)
PMV BLD AUTO: 9.2 FL (ref 8.9–12.9)
POTASSIUM SERPL-SCNC: 3.2 MMOL/L (ref 3.5–5.1)
POTASSIUM SERPL-SCNC: 3.2 MMOL/L (ref 3.5–5.1)
POTASSIUM SERPL-SCNC: 3.6 MMOL/L (ref 3.5–5.1)
PROT SERPL-MCNC: 8.5 G/DL (ref 6.4–8.2)
RBC # BLD AUTO: 5.65 M/UL (ref 4.1–5.7)
SODIUM SERPL-SCNC: 130 MMOL/L (ref 136–145)
SODIUM SERPL-SCNC: 131 MMOL/L (ref 136–145)
SODIUM SERPL-SCNC: 137 MMOL/L (ref 136–145)
TROPONIN I SERPL HS-MCNC: 5 NG/L (ref 0–76)
TSH SERPL DL<=0.05 MIU/L-ACNC: 1.29 UIU/ML (ref 0.36–3.74)
WBC # BLD AUTO: 10.5 K/UL (ref 4.1–11.1)

## 2025-01-19 PROCEDURE — 84484 ASSAY OF TROPONIN QUANT: CPT

## 2025-01-19 PROCEDURE — 83036 HEMOGLOBIN GLYCOSYLATED A1C: CPT

## 2025-01-19 PROCEDURE — 80048 BASIC METABOLIC PNL TOTAL CA: CPT

## 2025-01-19 PROCEDURE — 84443 ASSAY THYROID STIM HORMONE: CPT

## 2025-01-19 PROCEDURE — 85025 COMPLETE CBC W/AUTO DIFF WBC: CPT

## 2025-01-19 PROCEDURE — 83690 ASSAY OF LIPASE: CPT

## 2025-01-19 PROCEDURE — 6360000002 HC RX W HCPCS: Performed by: INTERNAL MEDICINE

## 2025-01-19 PROCEDURE — 2580000003 HC RX 258: Performed by: STUDENT IN AN ORGANIZED HEALTH CARE EDUCATION/TRAINING PROGRAM

## 2025-01-19 PROCEDURE — 99372 PR PHYSICIAN PHONE CONSULT,INTERMED: CPT | Performed by: INTERNAL MEDICINE

## 2025-01-19 PROCEDURE — 84100 ASSAY OF PHOSPHORUS: CPT

## 2025-01-19 PROCEDURE — 6360000002 HC RX W HCPCS

## 2025-01-19 PROCEDURE — 6370000000 HC RX 637 (ALT 250 FOR IP): Performed by: INTERNAL MEDICINE

## 2025-01-19 PROCEDURE — 36415 COLL VENOUS BLD VENIPUNCTURE: CPT

## 2025-01-19 PROCEDURE — 2500000003 HC RX 250 WO HCPCS: Performed by: INTERNAL MEDICINE

## 2025-01-19 PROCEDURE — 83735 ASSAY OF MAGNESIUM: CPT

## 2025-01-19 PROCEDURE — 80053 COMPREHEN METABOLIC PANEL: CPT

## 2025-01-19 PROCEDURE — 6360000002 HC RX W HCPCS: Performed by: STUDENT IN AN ORGANIZED HEALTH CARE EDUCATION/TRAINING PROGRAM

## 2025-01-19 PROCEDURE — 2580000003 HC RX 258

## 2025-01-19 PROCEDURE — 2060000000 HC ICU INTERMEDIATE R&B

## 2025-01-19 RX ORDER — DESMOPRESSIN ACETATE 0.1 MG/ML
10 SOLUTION NASAL NIGHTLY
Status: DISCONTINUED | OUTPATIENT
Start: 2025-01-19 | End: 2025-01-20

## 2025-01-19 RX ORDER — HYDRALAZINE HYDROCHLORIDE 20 MG/ML
10 INJECTION INTRAMUSCULAR; INTRAVENOUS EVERY 6 HOURS PRN
Status: DISCONTINUED | OUTPATIENT
Start: 2025-01-19 | End: 2025-01-20 | Stop reason: HOSPADM

## 2025-01-19 RX ORDER — DESMOPRESSIN ACETATE 0.1 MG/ML
10 SOLUTION NASAL 2 TIMES DAILY
Status: DISCONTINUED | OUTPATIENT
Start: 2025-01-19 | End: 2025-01-19

## 2025-01-19 RX ADMIN — NICARDIPINE HYDROCHLORIDE 7.5 MG/HR: 0.1 INJECTION, SOLUTION INTRAVENOUS at 22:02

## 2025-01-19 RX ADMIN — HYDROMORPHONE HYDROCHLORIDE 1 MG: 1 INJECTION, SOLUTION INTRAMUSCULAR; INTRAVENOUS; SUBCUTANEOUS at 03:53

## 2025-01-19 RX ADMIN — DESMOPRESSIN ACETATE 1 MCG: 4 INJECTION, SOLUTION INTRAVENOUS; SUBCUTANEOUS at 04:11

## 2025-01-19 RX ADMIN — OXYCODONE 10 MG: 5 TABLET ORAL at 22:01

## 2025-01-19 RX ADMIN — ENOXAPARIN SODIUM 30 MG: 100 INJECTION SUBCUTANEOUS at 09:14

## 2025-01-19 RX ADMIN — HYDRALAZINE HYDROCHLORIDE 10 MG: 20 INJECTION INTRAMUSCULAR; INTRAVENOUS at 17:39

## 2025-01-19 RX ADMIN — ENOXAPARIN SODIUM 30 MG: 100 INJECTION SUBCUTANEOUS at 21:41

## 2025-01-19 RX ADMIN — NICARDIPINE HYDROCHLORIDE 2.5 MG/HR: 0.1 INJECTION, SOLUTION INTRAVENOUS at 18:58

## 2025-01-19 RX ADMIN — OXYCODONE 10 MG: 5 TABLET ORAL at 12:53

## 2025-01-19 RX ADMIN — OXYCODONE 10 MG: 5 TABLET ORAL at 17:33

## 2025-01-19 RX ADMIN — SODIUM CHLORIDE, PRESERVATIVE FREE 10 ML: 5 INJECTION INTRAVENOUS at 09:18

## 2025-01-19 RX ADMIN — SERTRALINE HYDROCHLORIDE 50 MG: 50 TABLET ORAL at 09:15

## 2025-01-19 RX ADMIN — DESMOPRESSIN ACETATE 10 MCG: 10 SPRAY NASAL at 21:41

## 2025-01-19 RX ADMIN — OXYCODONE 10 MG: 5 TABLET ORAL at 09:15

## 2025-01-19 RX ADMIN — SODIUM CHLORIDE, PRESERVATIVE FREE 40 MG: 5 INJECTION INTRAVENOUS at 09:15

## 2025-01-19 ASSESSMENT — PAIN DESCRIPTION - DESCRIPTORS
DESCRIPTORS: ACHING

## 2025-01-19 ASSESSMENT — PAIN DESCRIPTION - LOCATION
LOCATION: ABDOMEN

## 2025-01-19 ASSESSMENT — PAIN SCALES - GENERAL
PAINLEVEL_OUTOF10: 7
PAINLEVEL_OUTOF10: 10
PAINLEVEL_OUTOF10: 5
PAINLEVEL_OUTOF10: 6
PAINLEVEL_OUTOF10: 6
PAINLEVEL_OUTOF10: 4

## 2025-01-19 ASSESSMENT — PAIN SCALES - WONG BAKER: WONGBAKER_NUMERICALRESPONSE: HURTS A LITTLE BIT

## 2025-01-19 ASSESSMENT — PAIN DESCRIPTION - ORIENTATION
ORIENTATION: INNER

## 2025-01-19 NOTE — PROGRESS NOTES
Patient was complaining of persistent pain, Moody catheter placed in the emergency room was removed because of the patient intolerance of this.  Patient was on the floor complaining of abdominal pain.  Will repeat CT scan of the abdomen and pelvis.  Urology consult initially scheduled for tomorrow was moved forward to today.

## 2025-01-19 NOTE — PROGRESS NOTES
4 Eyes Skin Assessment     NAME:  Howard Culp  YOB: 1985  MEDICAL RECORD NUMBER:  [id number]    The patient is being assessed for  Admission    I agree that at least one RN has performed a thorough Head to Toe Skin Assessment on the patient. ALL assessment sites listed below have been assessed.      Areas assessed by both nurses:    Head, Face, Ears, Shoulders, Back, Chest, Arms, Elbows, Hands, Sacrum. Buttock, Coccyx, Ischium, Legs. Feet and Heels, and Under Medical Devices         Does the Patient have a Wound? No noted wound(s) Previous wound/callous on mid posterior R. Foot from stepping on nail five years ago. Dry skin Bilateral feet.       Joey Prevention initiated by RN: No  Wound Care Orders initiated by RN: No    Pressure Injury (Stage 3,4, Unstageable, DTI, NWPT, and Complex wounds) if present, place Wound referral order by RN under : No    New Ostomies, if present place, Ostomy referral order under : No     Nurse 1 eSignature: Electronically signed by Sreekanth Lorenzo RN on 1/19/25 at 5:15 PM EST    **SHARE this note so that the co-signing nurse can place an eSignature**    Nurse 2 eSignature: Electronically signed by Carolina Telles RN on 1/19/25 at 5:30 PM EST

## 2025-01-19 NOTE — PLAN OF CARE
Problem: Pain  Goal: Verbalizes/displays adequate comfort level or baseline comfort level  1/19/2025 0820 by Luisana Webb RN  Outcome: Not Progressing  1/18/2025 2009 by Sreekanth Lorenzo RN  Outcome: Progressing  1/18/2025 2008 by Sreekanth Lorenzo RN  Outcome: Not Progressing     Problem: Chronic Conditions and Co-morbidities  Goal: Patient's chronic conditions and co-morbidity symptoms are monitored and maintained or improved  1/19/2025 0820 by Luisana Webb RN  Outcome: Progressing  1/18/2025 2009 by Sreekanth Lorenzo RN  Outcome: Progressing  1/18/2025 2008 by Sreekanth Lorenzo RN  Outcome: Not Progressing  Flowsheets (Taken 1/18/2025 1715)  Care Plan - Patient's Chronic Conditions and Co-Morbidity Symptoms are Monitored and Maintained or Improved: Monitor and assess patient's chronic conditions and comorbid symptoms for stability, deterioration, or improvement     Problem: Discharge Planning  Goal: Discharge to home or other facility with appropriate resources  1/19/2025 0820 by Luisana Webb RN  Outcome: Progressing  1/18/2025 2009 by Sreekanth Lorenzo RN  Outcome: Progressing  1/18/2025 2008 by Sreekanth Lorenzo RN  Outcome: Not Progressing  Flowsheets (Taken 1/18/2025 1715)  Discharge to home or other facility with appropriate resources: Identify barriers to discharge with patient and caregiver     Problem: Safety - Adult  Goal: Free from fall injury  Outcome: Progressing

## 2025-01-19 NOTE — PROGRESS NOTES
History obtained from speaking with patient and chart review.  Pt notes he was diagnosed with DI at the age of 19 and was followed by Nephrology, but does not recall their name. He notes that he has not seen the Nephrologist “in awhile”.  Pt reports he was taking nasal DDAVP for his DI, but can not recall the last time he took it. “I have not taken it in awhile and I think I need to get back on it.”  Pt presented to the ED with abdominal pain and was found to have urinary retention and hydronephrosis.   His admission labs also included a urine Osm and urine sodium, both of which were low, demonstrating that he is not able to full concentrate his urine. He has been drinking lots of water (polydipsia) which has allowed him to keep his sodium levels in a normal range. On admission his serum sodium was 141.    I recommend he be started on DDAVP, he received an IV dose at 0200 this AM, per patient he was taking the nasal DDAVP nightly. I will order nasal DDAVP nightly, to start tonight.

## 2025-01-19 NOTE — PLAN OF CARE
Problem: Chronic Conditions and Co-morbidities  Goal: Patient's chronic conditions and co-morbidity symptoms are monitored and maintained or improved  1/19/2025 1118 by Sreekanth Lorenzo RN  Outcome: Progressing  1/19/2025 0820 by Luisana Webb RN  Outcome: Progressing  Flowsheets (Taken 1/19/2025 0800 by Sreekanth Lorenzo RN)  Care Plan - Patient's Chronic Conditions and Co-Morbidity Symptoms are Monitored and Maintained or Improved: Monitor and assess patient's chronic conditions and comorbid symptoms for stability, deterioration, or improvement     Problem: Discharge Planning  Goal: Discharge to home or other facility with appropriate resources  1/19/2025 1118 by Sreekanth Lorenzo RN  Outcome: Progressing  1/19/2025 0820 by Luisana Webb RN  Outcome: Progressing  Flowsheets (Taken 1/19/2025 0800 by Sreekanth Lorenzo RN)  Discharge to home or other facility with appropriate resources: Identify barriers to discharge with patient and caregiver     Problem: Pain  Goal: Verbalizes/displays adequate comfort level or baseline comfort level  1/19/2025 1118 by Sreekanth Lorenzo RN  Outcome: Progressing  1/19/2025 0820 by Luisana Webb RN  Outcome: Not Progressing     Problem: Safety - Adult  Goal: Free from fall injury  1/19/2025 1118 by Sreekanth Lorenzo RN  Outcome: Progressing  1/19/2025 0820 by Luisana Webb RN  Outcome: Progressing     Problem: Pain  Goal: Verbalizes/displays adequate comfort level or baseline comfort level  1/19/2025 1118 by Sreekanth Lorenzo RN  Outcome: Progressing  1/19/2025 0820 by Luisana Webb RN  Outcome: Not Progressing

## 2025-01-19 NOTE — PROGRESS NOTES
[facility] Hospitalist cross coverage (7p-7a) progress note:    Notified by RN of severe abdominal pain, polydipsia and polyuria.  Patient is unsure of his desmopressin dose, states he used to take something intranasally.  Discussed with pharmacy, no fill history available since 2017.  Bladder scanned patient-450, patient voided about 400.  Patient has had well over a liter and a half of urine output in the first few hours of this shift.  Will recheck sodium, urine Osmo, urine sodium, and consult endocrine for further recommendations.  KUB obtained showing nonobstructive bowel gas pattern, simethicone added.      Leeann CHANDLER

## 2025-01-19 NOTE — PROGRESS NOTES
2030 Pt currently requesting copious amounts of water. Educated on 1L fluid restriction that was ordered. No evidence of learning as patient walked to bathroom with cup and filled with water from sink shortly after.    2100 Pt found in room kneeling on the floor rubbing his stomach on the side of bed. Pt stated \" it feels like there is a burp that needs to come up and once it comes up I will be good.\" This RN requested pt to get back in bed and off the floor, pt refused, and continued to rub stomach on bed.     2145 Per Leeann BUENROSTRO stop fluids and removed 1L restriction until we gather more data from labs. Labs drawn and sent.    2200 Per Leeann BUENROSTRO stop cardene gtt. Pt still in 10/10 pain, crying ,rocking back and forth on side of bed. Leeann BUENROSTRO at bedside and said OK to give PRN dilaudid early. KUB ordered.     2230 Pt found in bathroom throwing water from sink on stomach. States the water on his stomach helps. RN helped pt back to bed and provided icepacks.     0200 Order for DDAVP received, waiting for pharmacy to sent to unit.

## 2025-01-19 NOTE — PROGRESS NOTES
Hospitalist Progress Note  Nino Salgado MD  Answering service: [phone] OR [phone] from in house phone        Date of Service:  2025  NAME:  Howard Culp  :  1985  MRN:  [id number]      Admission Summary:   Howard Culp is a 39 y.o. male with past medical history significant for diabetes insipidus, asthma, hypertension, anxiety disorder presented at the emergency room with abdominal pain.  This started a couple of days ago and progressively getting worse.  The abdominal pain is diffuse in location, no radiation, no known aggravating or relieving factors, associated with nausea and vomiting, constant, dull ache and 6/10 in severity.  Patient is a very poor historian and very nonchalant.  He stated that read my record for the information.  He also reported chest pain and stated that it hurts all over.  He was last admitted to this hospital from 2022 to 2022, was admitted for evaluation and treatment of abdominal pain but left AMA.  CT of the abdomen and pelvis done on arrival at the emergency room showed massive distention of the bladder as well as right-sided hydronephrosis.  Moody catheter inserted in the emergency room and about 850 mL of urine was drained.  He was referred to the hospitalist service for admission.    Interval history / Subjective:   Seen and examined for follow-up of hypertensive urgency, acute urinary retention, diabetes insipidus.  Patient looks better today.  He wants to go home.  I explained that since we have started desmopressin for tonight, we would have to monitor her sodium.  Patient understood.     Assessment & Plan:     Acute urinary retention  Right-sided hydronephrosis  -Urology seen.  Outpatient follow-up  -Right hydronephrosis without hydroureter is chronic    Malignant hypertensive urgency  -Was on a Cardene drip  -This has been

## 2025-01-19 NOTE — PLAN OF CARE
Problem: Chronic Conditions and Co-morbidities  Goal: Patient's chronic conditions and co-morbidity symptoms are monitored and maintained or improved  1/18/2025 2009 by Sreekanth Lorenzo RN  Outcome: Progressing    Flowsheets (Taken 1/18/2025 1715)  Care Plan - Patient's Chronic Conditions and Co-Morbidity Symptoms are Monitored and Maintained or Improved: Monitor and assess patient's chronic conditions and comorbid symptoms for stability, deterioration, or improvement     Problem: Discharge Planning  Goal: Discharge to home or other facility with appropriate resources  1/18/2025 2009 by Sreekanth Lorenzo RN  Outcome: Progressing    Flowsheets (Taken 1/18/2025 1715)  Discharge to home or other facility with appropriate resources: Identify barriers to discharge with patient and caregiver     Problem: Pain  Goal: Verbalizes/displays adequate comfort level or baseline comfort level  1/18/2025 2009 by Sreekanth Lorenzo RN  Outcome: Progressing       Problem: Chronic Conditions and Co-morbidities  Goal: Patient's chronic conditions and co-morbidity symptoms are monitored and maintained or improved  1/18/2025 2009 by Sreekanth Lorenzo RN  Outcome: Progressing    Flowsheets (Taken 1/18/2025 1715)  Care Plan - Patient's Chronic Conditions and Co-Morbidity Symptoms are Monitored and Maintained or Improved: Monitor and assess patient's chronic conditions and comorbid symptoms for stability, deterioration, or improvement     Problem: Discharge Planning  Goal: Discharge to home or other facility with appropriate resources  1/18/2025 2009 by Sreekanth Lorenzo RN  Outcome: Progressing    Flowsheets (Taken 1/18/2025 1715)  Discharge to home or other facility with appropriate resources: Identify barriers to discharge with patient and caregiver     Problem: Pain  Goal: Verbalizes/displays adequate comfort level or baseline comfort level  1/18/2025 2009 by Sreekanth Lorenzo RN  Outcome: Progressing

## 2025-01-20 ENCOUNTER — CLINICAL DOCUMENTATION (OUTPATIENT)
Age: 40
End: 2025-01-20

## 2025-01-20 VITALS
SYSTOLIC BLOOD PRESSURE: 168 MMHG | HEIGHT: 68 IN | RESPIRATION RATE: 25 BRPM | TEMPERATURE: 99.5 F | HEART RATE: 77 BPM | OXYGEN SATURATION: 100 % | DIASTOLIC BLOOD PRESSURE: 99 MMHG | BODY MASS INDEX: 35.85 KG/M2 | WEIGHT: 236.55 LBS

## 2025-01-20 PROBLEM — R33.8 ACUTE URINARY RETENTION: Status: RESOLVED | Noted: 2025-01-18 | Resolved: 2025-01-20

## 2025-01-20 LAB
ANION GAP SERPL CALC-SCNC: 6 MMOL/L (ref 2–12)
ANION GAP SERPL CALC-SCNC: 7 MMOL/L (ref 2–12)
ANION GAP SERPL CALC-SCNC: 8 MMOL/L (ref 2–12)
BASOPHILS # BLD: 0.02 K/UL (ref 0–0.1)
BASOPHILS NFR BLD: 0.3 % (ref 0–1)
BUN SERPL-MCNC: 5 MG/DL (ref 6–20)
BUN SERPL-MCNC: 7 MG/DL (ref 6–20)
BUN SERPL-MCNC: 8 MG/DL (ref 6–20)
BUN/CREAT SERPL: 6 (ref 12–20)
BUN/CREAT SERPL: 8 (ref 12–20)
BUN/CREAT SERPL: 9 (ref 12–20)
CALCIUM SERPL-MCNC: 9 MG/DL (ref 8.5–10.1)
CALCIUM SERPL-MCNC: 9.1 MG/DL (ref 8.5–10.1)
CALCIUM SERPL-MCNC: 9.2 MG/DL (ref 8.5–10.1)
CHLORIDE SERPL-SCNC: 100 MMOL/L (ref 97–108)
CHLORIDE SERPL-SCNC: 98 MMOL/L (ref 97–108)
CHLORIDE SERPL-SCNC: 98 MMOL/L (ref 97–108)
CO2 SERPL-SCNC: 25 MMOL/L (ref 21–32)
CO2 SERPL-SCNC: 26 MMOL/L (ref 21–32)
CO2 SERPL-SCNC: 27 MMOL/L (ref 21–32)
CREAT SERPL-MCNC: 0.82 MG/DL (ref 0.7–1.3)
CREAT SERPL-MCNC: 0.86 MG/DL (ref 0.7–1.3)
CREAT SERPL-MCNC: 0.89 MG/DL (ref 0.7–1.3)
DIFFERENTIAL METHOD BLD: NORMAL
EKG ATRIAL RATE: 86 BPM
EKG DIAGNOSIS: NORMAL
EKG P AXIS: 66 DEGREES
EKG P-R INTERVAL: 154 MS
EKG Q-T INTERVAL: 382 MS
EKG QRS DURATION: 112 MS
EKG QTC CALCULATION (BAZETT): 457 MS
EKG R AXIS: 34 DEGREES
EKG T AXIS: 58 DEGREES
EKG VENTRICULAR RATE: 86 BPM
EOSINOPHIL # BLD: 0.06 K/UL (ref 0–0.4)
EOSINOPHIL NFR BLD: 1 % (ref 0–7)
ERYTHROCYTE [DISTWIDTH] IN BLOOD BY AUTOMATED COUNT: 14.2 % (ref 11.5–14.5)
GLUCOSE SERPL-MCNC: 101 MG/DL (ref 65–100)
GLUCOSE SERPL-MCNC: 102 MG/DL (ref 65–100)
GLUCOSE SERPL-MCNC: 108 MG/DL (ref 65–100)
HCT VFR BLD AUTO: 43.1 % (ref 36.6–50.3)
HGB BLD-MCNC: 15 G/DL (ref 12.1–17)
IMM GRANULOCYTES # BLD AUTO: 0.01 K/UL (ref 0–0.04)
IMM GRANULOCYTES NFR BLD AUTO: 0.2 % (ref 0–0.5)
LYMPHOCYTES # BLD: 2.01 K/UL (ref 0.8–3.5)
LYMPHOCYTES NFR BLD: 34.4 % (ref 12–49)
MCH RBC QN AUTO: 30.3 PG (ref 26–34)
MCHC RBC AUTO-ENTMCNC: 34.8 G/DL (ref 30–36.5)
MCV RBC AUTO: 87.1 FL (ref 80–99)
MONOCYTES # BLD: 0.53 K/UL (ref 0–1)
MONOCYTES NFR BLD: 9.1 % (ref 5–13)
NEUTS SEG # BLD: 3.22 K/UL (ref 1.8–8)
NEUTS SEG NFR BLD: 55 % (ref 32–75)
NRBC # BLD: 0 K/UL (ref 0–0.01)
NRBC BLD-RTO: 0 PER 100 WBC
PLATELET # BLD AUTO: 309 K/UL (ref 150–400)
PMV BLD AUTO: 9.4 FL (ref 8.9–12.9)
POTASSIUM SERPL-SCNC: 3.2 MMOL/L (ref 3.5–5.1)
POTASSIUM SERPL-SCNC: 3.3 MMOL/L (ref 3.5–5.1)
POTASSIUM SERPL-SCNC: 3.8 MMOL/L (ref 3.5–5.1)
RBC # BLD AUTO: 4.95 M/UL (ref 4.1–5.7)
SODIUM SERPL-SCNC: 130 MMOL/L (ref 136–145)
SODIUM SERPL-SCNC: 132 MMOL/L (ref 136–145)
SODIUM SERPL-SCNC: 133 MMOL/L (ref 136–145)
TROPONIN I SERPL HS-MCNC: 6 NG/L (ref 0–76)
WBC # BLD AUTO: 5.9 K/UL (ref 4.1–11.1)

## 2025-01-20 PROCEDURE — 6360000002 HC RX W HCPCS: Performed by: STUDENT IN AN ORGANIZED HEALTH CARE EDUCATION/TRAINING PROGRAM

## 2025-01-20 PROCEDURE — 84484 ASSAY OF TROPONIN QUANT: CPT

## 2025-01-20 PROCEDURE — 6370000000 HC RX 637 (ALT 250 FOR IP): Performed by: INTERNAL MEDICINE

## 2025-01-20 PROCEDURE — 93005 ELECTROCARDIOGRAM TRACING: CPT | Performed by: STUDENT IN AN ORGANIZED HEALTH CARE EDUCATION/TRAINING PROGRAM

## 2025-01-20 PROCEDURE — 85025 COMPLETE CBC W/AUTO DIFF WBC: CPT

## 2025-01-20 PROCEDURE — 2580000003 HC RX 258: Performed by: STUDENT IN AN ORGANIZED HEALTH CARE EDUCATION/TRAINING PROGRAM

## 2025-01-20 PROCEDURE — 6370000000 HC RX 637 (ALT 250 FOR IP)

## 2025-01-20 PROCEDURE — 99223 1ST HOSP IP/OBS HIGH 75: CPT | Performed by: INTERNAL MEDICINE

## 2025-01-20 PROCEDURE — 2500000003 HC RX 250 WO HCPCS: Performed by: INTERNAL MEDICINE

## 2025-01-20 PROCEDURE — 6360000002 HC RX W HCPCS: Performed by: INTERNAL MEDICINE

## 2025-01-20 PROCEDURE — 51798 US URINE CAPACITY MEASURE: CPT

## 2025-01-20 PROCEDURE — 93010 ELECTROCARDIOGRAM REPORT: CPT | Performed by: INTERNAL MEDICINE

## 2025-01-20 PROCEDURE — 6370000000 HC RX 637 (ALT 250 FOR IP): Performed by: STUDENT IN AN ORGANIZED HEALTH CARE EDUCATION/TRAINING PROGRAM

## 2025-01-20 PROCEDURE — 80048 BASIC METABOLIC PNL TOTAL CA: CPT

## 2025-01-20 RX ORDER — PANTOPRAZOLE SODIUM 40 MG/1
40 TABLET, DELAYED RELEASE ORAL
Status: DISCONTINUED | OUTPATIENT
Start: 2025-01-21 | End: 2025-01-20 | Stop reason: HOSPADM

## 2025-01-20 RX ORDER — OXYCODONE HYDROCHLORIDE 5 MG/1
5 TABLET ORAL ONCE
Status: COMPLETED | OUTPATIENT
Start: 2025-01-20 | End: 2025-01-20

## 2025-01-20 RX ORDER — OXYCODONE HYDROCHLORIDE 10 MG/1
10 TABLET ORAL EVERY 6 HOURS PRN
Qty: 15 TABLET | Refills: 0 | Status: SHIPPED | OUTPATIENT
Start: 2025-01-20 | End: 2025-01-24

## 2025-01-20 RX ORDER — METOPROLOL TARTRATE 50 MG
50 TABLET ORAL 2 TIMES DAILY
Status: DISCONTINUED | OUTPATIENT
Start: 2025-01-20 | End: 2025-01-20 | Stop reason: HOSPADM

## 2025-01-20 RX ORDER — AMLODIPINE BESYLATE 5 MG/1
10 TABLET ORAL DAILY
Status: DISCONTINUED | OUTPATIENT
Start: 2025-01-20 | End: 2025-01-20 | Stop reason: HOSPADM

## 2025-01-20 RX ORDER — METOPROLOL TARTRATE 50 MG
50 TABLET ORAL 2 TIMES DAILY
Status: SHIPPED | COMMUNITY
Start: 2025-01-20

## 2025-01-20 RX ADMIN — NICARDIPINE HYDROCHLORIDE 15 MG/HR: 0.1 INJECTION, SOLUTION INTRAVENOUS at 03:27

## 2025-01-20 RX ADMIN — NICARDIPINE HYDROCHLORIDE 15 MG/HR: 0.1 INJECTION, SOLUTION INTRAVENOUS at 06:19

## 2025-01-20 RX ADMIN — SODIUM CHLORIDE, PRESERVATIVE FREE 40 MG: 5 INJECTION INTRAVENOUS at 08:51

## 2025-01-20 RX ADMIN — OXYCODONE HYDROCHLORIDE 5 MG: 5 TABLET ORAL at 17:00

## 2025-01-20 RX ADMIN — OXYCODONE 10 MG: 5 TABLET ORAL at 05:34

## 2025-01-20 RX ADMIN — METOPROLOL TARTRATE 50 MG: 50 TABLET, FILM COATED ORAL at 14:44

## 2025-01-20 RX ADMIN — SERTRALINE HYDROCHLORIDE 50 MG: 50 TABLET ORAL at 08:50

## 2025-01-20 RX ADMIN — ENOXAPARIN SODIUM 30 MG: 100 INJECTION SUBCUTANEOUS at 08:50

## 2025-01-20 RX ADMIN — SIMETHICONE 80 MG: 80 TABLET, CHEWABLE ORAL at 05:35

## 2025-01-20 RX ADMIN — NICARDIPINE HYDROCHLORIDE 12 MG/HR: 0.1 INJECTION, SOLUTION INTRAVENOUS at 07:50

## 2025-01-20 RX ADMIN — NICARDIPINE HYDROCHLORIDE 10 MG/HR: 0.1 INJECTION, SOLUTION INTRAVENOUS at 10:00

## 2025-01-20 RX ADMIN — OXYCODONE 10 MG: 5 TABLET ORAL at 09:28

## 2025-01-20 RX ADMIN — SODIUM CHLORIDE, PRESERVATIVE FREE 10 ML: 5 INJECTION INTRAVENOUS at 08:57

## 2025-01-20 RX ADMIN — NICARDIPINE HYDROCHLORIDE 15 MG/HR: 0.1 INJECTION, SOLUTION INTRAVENOUS at 04:51

## 2025-01-20 RX ADMIN — DICYCLOMINE HYDROCHLORIDE 10 MG: 10 CAPSULE ORAL at 16:36

## 2025-01-20 RX ADMIN — LORAZEPAM 1 MG: 2 INJECTION INTRAMUSCULAR; INTRAVENOUS at 00:34

## 2025-01-20 RX ADMIN — OXYCODONE 10 MG: 5 TABLET ORAL at 14:50

## 2025-01-20 RX ADMIN — AMLODIPINE BESYLATE 10 MG: 5 TABLET ORAL at 13:17

## 2025-01-20 RX ADMIN — NICARDIPINE HYDROCHLORIDE 15 MG/HR: 0.1 INJECTION, SOLUTION INTRAVENOUS at 02:03

## 2025-01-20 RX ADMIN — NICARDIPINE HYDROCHLORIDE 7 MG/HR: 0.1 INJECTION, SOLUTION INTRAVENOUS at 13:18

## 2025-01-20 RX ADMIN — POTASSIUM CHLORIDE 40 MEQ: 750 TABLET, EXTENDED RELEASE ORAL at 05:35

## 2025-01-20 ASSESSMENT — PAIN SCALES - GENERAL
PAINLEVEL_OUTOF10: 10
PAINLEVEL_OUTOF10: 8
PAINLEVEL_OUTOF10: 6
PAINLEVEL_OUTOF10: 6

## 2025-01-20 ASSESSMENT — PAIN DESCRIPTION - ORIENTATION
ORIENTATION: INNER
ORIENTATION: INNER
ORIENTATION: RIGHT;INNER

## 2025-01-20 ASSESSMENT — PAIN DESCRIPTION - DESCRIPTORS
DESCRIPTORS: ACHING

## 2025-01-20 ASSESSMENT — PAIN DESCRIPTION - LOCATION
LOCATION: ABDOMEN;ARM
LOCATION: ABDOMEN;CHEST
LOCATION: ABDOMEN

## 2025-01-20 NOTE — PROGRESS NOTES
Clinical Pharmacy Note: IV to PO Automatic Conversion  Please note: Howard Culp’s medication(s) (pantoprazole) has/have been changed from IV to PO based on the following critiera:    Patient is tolerating oral medications  Patient is tolerating a diet more advanced than clear liquids  Patient is not requiring vasopressors    This IV to PO conversion is based on the [employer] approved automatic conversion policy for eligible patients.  Please call with questions.

## 2025-01-20 NOTE — PROGRESS NOTES
Patient: Howard Culp MRN: [id number]  SSN: [id number]    YOB: 1985  Age: 39 y.o.  Sex: male        ADMITTED: 2025 to [facility], * by Tammie Dubose MD for Polydipsia [R63.1]  Tachycardia [R00.0]  Acute urinary retention [R33.8]  Nausea and vomiting, unspecified vomiting type [R11.2]    Howard Culp is doing fair. Denies voiding difficulties or abdominal pain.   24 hr urine output 7700 cc > 3850 cc > 2450 cc  No recent bladder scans   Serum cr wnl     Vitals: Temp (24hrs), Av.3 °F (36.8 °C), Min:98.2 °F (36.8 °C), Max:98.5 °F (36.9 °C)    Blood pressure (!) 163/85, pulse 90, temperature 98.5 °F (36.9 °C), temperature source Oral, resp. rate 22, height 1.727 m (5' 8\"), weight 107.3 kg (236 lb 8.9 oz), SpO2 98%.    Intake and Output:   1901 -  0700  In: 4485.4 [P.O.:3650; I.V.:835.4]  Out: 7100 [Urine:7100]   0701 -  1900  In: -   Out: 2450 [Urine:2450]  LAUREL Output lats 24 hrs: No data found.   LAUREL Output last 8 hrs: No data found.  BM over last 24 hrs: No data found.    Physical Exam  General: NAD, pleasant  Respiratory: no distress, breathing easily, room air  Abdomen: soft, no distention; non-tender to palpation  : no CVA tenderness, voiding independently   Neuro: Appropriate, no focal neurological deficits  Skin: warm, dry  Extremities: moves all, full ROM    Labs:  CBC:   Lab Results   Component Value Date/Time    WBC 5.9 2025 05:18 AM    HCT 43.1 2025 05:18 AM     2025 05:18 AM     BMP:   Lab Results   Component Value Date/Time     2025 09:22 AM    K 3.8 2025 09:22 AM     2025 09:22 AM    CO2 26 2025 09:22 AM    BUN 5 2025 09:22 AM      Assessment/Plan:   Resolved urinary retention, likely exacerbated high UOPs. Recommend outpatient followup for consideration urodynamics. Repeat PVR one time today. If ever questionable emptying, check PVR, I&O cath if needed. Would recommend outpatient

## 2025-01-20 NOTE — PLAN OF CARE
Problem: Chronic Conditions and Co-morbidities  Goal: Patient's chronic conditions and co-morbidity symptoms are monitored and maintained or improved  1/20/2025 1549 by Sreekanth Lorenzo RN  Outcome: Completed  1/20/2025 1523 by Sreekanth Lorenzo RN  Outcome: Progressing  Flowsheets (Taken 1/20/2025 0800)  Care Plan - Patient's Chronic Conditions and Co-Morbidity Symptoms are Monitored and Maintained or Improved: Monitor and assess patient's chronic conditions and comorbid symptoms for stability, deterioration, or improvement     Problem: Discharge Planning  Goal: Discharge to home or other facility with appropriate resources  1/20/2025 1549 by Sreekanth Lorenzo RN  Outcome: Completed  1/20/2025 1523 by Sreekanth Lorenzo RN  Outcome: Progressing  Flowsheets (Taken 1/20/2025 0800)  Discharge to home or other facility with appropriate resources: Identify barriers to discharge with patient and caregiver     Problem: Pain  Goal: Verbalizes/displays adequate comfort level or baseline comfort level  1/20/2025 1549 by Sreekanth Lorenzo RN  Outcome: Completed  1/20/2025 1523 by Sreekanth Lorenzo RN  Outcome: Progressing     Problem: Safety - Adult  Goal: Free from fall injury  1/20/2025 1549 by Sreekanth Lorenzo RN  Outcome: Completed  1/20/2025 1523 by Sreekanth Lorenzo RN  Outcome: Progressing

## 2025-01-20 NOTE — DISCHARGE SUMMARY
Discharge Summary       PATIENT ID: Howard Culp  MRN: [id number]   YOB: 1985    DATE OF ADMISSION: 1/18/2025  7:00 AM    DATE OF DISCHARGE: 1/20/25   PRIMARY CARE PROVIDER: Luis Eduardo Lott MD     ATTENDING PHYSICIAN: Nino Salgado MD  DISCHARGING PROVIDER: Nino Salgado MD    To contact this individual call [phone] and ask the  to page.  If unavailable ask to be transferred the Adult Hospitalist Department.    CONSULTATIONS: IP CONSULT TO HOSPITALIST  IP CONSULT TO UROLOGY  IP CONSULT TO ENDOCRINOLOGY  IP CONSULT TO NEPHROLOGY    PROCEDURES/SURGERIES: * No surgery found *    ADMITTING DIAGNOSES & HOSPITAL COURSE:   Howard Culp is a 39 y.o. male with past medical history significant for diabetes insipidus, asthma, hypertension, anxiety disorder presented at the emergency room with abdominal pain.  This started a couple of days ago and progressively getting worse.  The abdominal pain is diffuse in location, no radiation, no known aggravating or relieving factors, associated with nausea and vomiting, constant, dull ache and 6/10 in severity.  Patient is a very poor historian and very nonchalant.  He stated that read my record for the information.  He also reported chest pain and stated that it hurts all over.  He was last admitted to this hospital from 6/25/2022 to 6/26/2022, was admitted for evaluation and treatment of abdominal pain but left AMA.  CT of the abdomen and pelvis done on arrival at the emergency room showed massive distention of the bladder as well as right-sided hydronephrosis.  Moody catheter inserted in the emergency room and about 850 mL of urine was drained.  He was referred to the hospitalist service for admission.    1.  Acute urinary retention POA: We will admit the patient for further evaluation and treatment.  Urology consult will be requested to assist in further evaluation and treatment.  Will continue with Moody catheter inserted in the

## 2025-01-20 NOTE — PROGRESS NOTES
1215- Isabel RN found pt with nicardipine drip turned off due to being \"tired of hearing the beeping\" was weaning and drip was currently on 9ml/hr    1225- Sreekanth RN notified, restarted the nicardipine drip and titrated down to 8ml/hr due to pt /90 (115)    1238- Attending notified, started pt on PO amlodipine and ordered nicardipine drip to be stopped at 1400.    1315- found pt with IV pump off again, restarted drip and titrated down to 7ml/hr, administered amlodipine po and locked IV panel.

## 2025-01-20 NOTE — CARE COORDINATION
Care Management Initial Assessment       RUR:6%  Readmission? No  1st IM letter given? No  1st  letter given: No    01/20/25 1530   Service Assessment   Patient Orientation Alert and Oriented   Cognition Alert   History Provided By Medical Record   Primary Caregiver Self   Support Systems Parent   PCP Verified by CM Yes   Last Visit to PCP Within last 3 months   Prior Functional Level Independent in ADLs/IADLs   Current Functional Level Independent in ADLs/IADLs   Can patient return to prior living arrangement Yes   Ability to make needs known: Good   Family able to assist with home care needs: Yes   Financial Resources Medicaid   Social/Functional History   Lives With Family   Bathroom Accessibility Accessible   Prior Level of Assist for ADLs Independent   Prior Level of Assist for Homemaking Independent   Ambulation Assistance Independent   Prior Level of Assist for Transfers Independent   Mode of Transportation Cab   Discharge Planning   Type of Residence House   Living Arrangements Parent   Type of Home Care Services None   Patient expects to be discharged to: House     Patient d/c planned for today.  Cab transport requested.    3:58 pm CM called [facility] ([phone]) to assist patient in coordinating cab transportation.  CM informed of 3 hr window.     Transport for 6:30pm.  CM requested transport with [facility] ([phone])   REFERENCE # [id number]       ASAF Calle, CRM  [phone]    ASAF Huston, [phone]

## 2025-01-20 NOTE — PLAN OF CARE
Problem: Chronic Conditions and Co-morbidities  Goal: Patient's chronic conditions and co-morbidity symptoms are monitored and maintained or improved  Outcome: Progressing  Flowsheets (Taken 1/20/2025 0800)  Care Plan - Patient's Chronic Conditions and Co-Morbidity Symptoms are Monitored and Maintained or Improved: Monitor and assess patient's chronic conditions and comorbid symptoms for stability, deterioration, or improvement     Problem: Discharge Planning  Goal: Discharge to home or other facility with appropriate resources  Outcome: Progressing  Flowsheets (Taken 1/20/2025 0800)  Discharge to home or other facility with appropriate resources: Identify barriers to discharge with patient and caregiver     Problem: Pain  Goal: Verbalizes/displays adequate comfort level or baseline comfort level  Outcome: Progressing     Problem: Safety - Adult  Goal: Free from fall injury  Outcome: Progressing
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
## 2025-01-24 ENCOUNTER — OFFICE VISIT (OUTPATIENT)
Age: 40
End: 2025-01-24

## 2025-01-24 ENCOUNTER — HOSPITAL ENCOUNTER (EMERGENCY)
Facility: HOSPITAL | Age: 40
Discharge: HOME | End: 2025-01-24
Attending: EMERGENCY MEDICINE
Payer: MEDICAID

## 2025-01-24 ENCOUNTER — APPOINTMENT (OUTPATIENT)
Facility: HOSPITAL | Age: 40
End: 2025-01-24
Payer: MEDICAID

## 2025-01-24 VITALS
SYSTOLIC BLOOD PRESSURE: 126 MMHG | WEIGHT: 242 LBS | HEART RATE: 96 BPM | BODY MASS INDEX: 36.68 KG/M2 | HEIGHT: 68 IN | DIASTOLIC BLOOD PRESSURE: 82 MMHG

## 2025-01-24 VITALS
DIASTOLIC BLOOD PRESSURE: 92 MMHG | TEMPERATURE: 98.8 F | BODY MASS INDEX: 36.87 KG/M2 | RESPIRATION RATE: 16 BRPM | HEART RATE: 90 BPM | SYSTOLIC BLOOD PRESSURE: 146 MMHG | OXYGEN SATURATION: 98 % | WEIGHT: 242.51 LBS

## 2025-01-24 DIAGNOSIS — E23.2 DI (DIABETES INSIPIDUS) (HCC): ICD-10-CM

## 2025-01-24 DIAGNOSIS — E87.1 HYPONATREMIA: ICD-10-CM

## 2025-01-24 DIAGNOSIS — I82.619 CEPHALIC VEIN THROMBOSIS: Primary | ICD-10-CM

## 2025-01-24 DIAGNOSIS — L03.113 CELLULITIS OF RIGHT ARM: ICD-10-CM

## 2025-01-24 DIAGNOSIS — F54 PSYCHOGENIC POLYDIPSIA: Primary | ICD-10-CM

## 2025-01-24 DIAGNOSIS — R63.1 PSYCHOGENIC POLYDIPSIA: Primary | ICD-10-CM

## 2025-01-24 LAB
ANION GAP SERPL CALC-SCNC: 7 MMOL/L (ref 2–12)
BUN SERPL-MCNC: 4 MG/DL (ref 6–20)
BUN/CREAT SERPL: 4 (ref 12–20)
CALCIUM SERPL-MCNC: 9.6 MG/DL (ref 8.5–10.1)
CHLORIDE SERPL-SCNC: 101 MMOL/L (ref 97–108)
CO2 SERPL-SCNC: 26 MMOL/L (ref 21–32)
COMMENT:: NORMAL
CREAT SERPL-MCNC: 1.02 MG/DL (ref 0.7–1.3)
ECHO BSA: 2.29 M2
GLUCOSE SERPL-MCNC: 90 MG/DL (ref 65–100)
MAGNESIUM SERPL-MCNC: 1.9 MG/DL (ref 1.6–2.4)
MAGNESIUM SERPL-MCNC: NORMAL MG/DL (ref 1.6–2.4)
POTASSIUM SERPL-SCNC: 3.7 MMOL/L (ref 3.5–5.1)
POTASSIUM SERPL-SCNC: ABNORMAL MMOL/L (ref 3.5–5.1)
SODIUM SERPL-SCNC: 134 MMOL/L (ref 136–145)
SPECIMEN HOLD: NORMAL

## 2025-01-24 PROCEDURE — 84132 ASSAY OF SERUM POTASSIUM: CPT

## 2025-01-24 PROCEDURE — 6360000002 HC RX W HCPCS: Performed by: PHYSICIAN ASSISTANT

## 2025-01-24 PROCEDURE — 93971 EXTREMITY STUDY: CPT

## 2025-01-24 PROCEDURE — 96372 THER/PROPH/DIAG INJ SC/IM: CPT

## 2025-01-24 PROCEDURE — 99284 EMERGENCY DEPT VISIT MOD MDM: CPT

## 2025-01-24 PROCEDURE — 83735 ASSAY OF MAGNESIUM: CPT

## 2025-01-24 PROCEDURE — 80048 BASIC METABOLIC PNL TOTAL CA: CPT

## 2025-01-24 PROCEDURE — 36415 COLL VENOUS BLD VENIPUNCTURE: CPT

## 2025-01-24 RX ORDER — CEPHALEXIN 500 MG/1
500 CAPSULE ORAL 3 TIMES DAILY
Qty: 21 CAPSULE | Refills: 0 | Status: SHIPPED | OUTPATIENT
Start: 2025-01-24 | End: 2025-01-31

## 2025-01-24 RX ORDER — NAPROXEN 500 MG/1
500 TABLET ORAL
COMMUNITY
Start: 2024-12-05

## 2025-01-24 RX ORDER — KETOROLAC TROMETHAMINE 30 MG/ML
15 INJECTION, SOLUTION INTRAMUSCULAR; INTRAVENOUS ONCE
Status: DISCONTINUED | OUTPATIENT
Start: 2025-01-24 | End: 2025-01-24

## 2025-01-24 RX ORDER — CYCLOBENZAPRINE HCL 5 MG
5 TABLET ORAL NIGHTLY
COMMUNITY

## 2025-01-24 RX ORDER — PANTOPRAZOLE SODIUM 20 MG
20 TABLET, DELAYED RELEASE (ENTERIC COATED) ORAL AS NEEDED
COMMUNITY
Start: 2023-11-11

## 2025-01-24 RX ORDER — DOXYCYCLINE 100 MG/1
100 CAPSULE ORAL DAILY
COMMUNITY
Start: 2025-01-22

## 2025-01-24 RX ORDER — GABAPENTIN 300 MG/1
300 CAPSULE ORAL AS NEEDED
COMMUNITY
Start: 2024-04-08

## 2025-01-24 RX ORDER — KETOROLAC TROMETHAMINE 30 MG/ML
15 INJECTION, SOLUTION INTRAMUSCULAR; INTRAVENOUS
Status: COMPLETED | OUTPATIENT
Start: 2025-01-24 | End: 2025-01-24

## 2025-01-24 RX ORDER — SUCRALFATE 1 G/1
1 TABLET ORAL AS NEEDED
COMMUNITY
Start: 2023-11-11

## 2025-01-24 RX ORDER — ESCITALOPRAM OXALATE 10 MG/1
10 TABLET ORAL DAILY
COMMUNITY

## 2025-01-24 RX ADMIN — KETOROLAC TROMETHAMINE 15 MG: 30 INJECTION, SOLUTION INTRAMUSCULAR at 14:52

## 2025-01-24 ASSESSMENT — PAIN DESCRIPTION - LOCATION
LOCATION: ARM

## 2025-01-24 ASSESSMENT — PAIN SCALES - GENERAL
PAINLEVEL_OUTOF10: 10
PAINLEVEL_OUTOF10: 9
PAINLEVEL_OUTOF10: 9

## 2025-01-24 ASSESSMENT — PAIN - FUNCTIONAL ASSESSMENT
PAIN_FUNCTIONAL_ASSESSMENT: ACTIVITIES ARE NOT PREVENTED

## 2025-01-24 ASSESSMENT — PAIN DESCRIPTION - DESCRIPTORS
DESCRIPTORS: THROBBING
DESCRIPTORS: ACHING
DESCRIPTORS: ACHING

## 2025-01-24 ASSESSMENT — PAIN DESCRIPTION - ONSET
ONSET: ON-GOING
ONSET: ON-GOING

## 2025-01-24 ASSESSMENT — PAIN DESCRIPTION - ORIENTATION
ORIENTATION: RIGHT
ORIENTATION: RIGHT
ORIENTATION: RIGHT;LEFT

## 2025-01-24 ASSESSMENT — PAIN DESCRIPTION - FREQUENCY
FREQUENCY: CONTINUOUS
FREQUENCY: CONTINUOUS

## 2025-01-24 ASSESSMENT — PAIN DESCRIPTION - PAIN TYPE
TYPE: ACUTE PAIN
TYPE: ACUTE PAIN
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
## 2025-01-24 NOTE — DISCHARGE INSTRUCTIONS
Superficial vein thrombosis does not necessarily require anticoagulation.  The treatment of choice is to elevate the extremity, apply a warm pack, and manage the pain.  If you have increasing swelling, redness, or unmanaged pain, you should be reevaluated.

## 2025-01-24 NOTE — ED TRIAGE NOTES
Arrives ambulatory alone as referral by Endo for sodium check.  Pt also c/o right forearm swelling r/t last hospitalization where the PIV was.  +pain to touch.

## 2025-01-24 NOTE — ED PROVIDER NOTES
[facility] EMERGENCY DEPARTMENT  EMERGENCY DEPARTMENT ENCOUNTER      Pt Name: Howard Culp  MRN: [id number]  Birthdate 1985  Date of evaluation: 1/24/2025  Provider: Gloria Romero PA-C    CHIEF COMPLAINT       Chief Complaint   Patient presents with    Arm Pain    Referral - General         HISTORY OF PRESENT ILLNESS   (Location/Symptom, Timing/Onset, Context/Setting, Quality, Duration, Modifying Factors, Severity)  Note limiting factors.   Patient is a 39-year-old male presents emergency department with concern for right arm swelling.  The patient states that he was being seen by his endocrinologist, and had recently been discharged from the hospital where he was treated for retention of urine, diabetes insipidus, and hypertensive urgency.  The patient was seen his endocrinologist today, and was advised to come to the emergency department to have labs drawn, as he had hyponatremia and hypokalemia during the hospital stay.  The patient is also having pain and swelling and redness to the right forearm, he states he had an IV in this area, and the AC, and he felt burning during administration of medication during his hospital stay.  He has been elevating the area, taking ibuprofen, and applying a heat pack, but it is not improving.  His endocrinologist also wanted this to be checked.    He has a significant medical history for diabetes, asthma, hypertension and anxiety          Review of External Medical Records:     Nursing Notes were reviewed.    REVIEW OF SYSTEMS    (2-9 systems for level 4, 10 or more for level 5)     Review of Systems    Except as noted above the remainder of the review of systems was reviewed and negative.       PAST MEDICAL HISTORY     Past Medical History:   Diagnosis Date    Asthma     Bronchitis    Diabetes (HCC)     Gastroparesis     Hypertension     Psychiatric disorder     anxiety         SURGICAL HISTORY       Past Surgical History:   Procedure Laterality Date    OTHER

## 2025-01-29 DIAGNOSIS — R63.1 PSYCHOGENIC POLYDIPSIA: ICD-10-CM

## 2025-01-29 DIAGNOSIS — F54 PSYCHOGENIC POLYDIPSIA: ICD-10-CM

## 2025-01-29 DIAGNOSIS — E23.2 DI (DIABETES INSIPIDUS) (HCC): Primary | ICD-10-CM

## 2025-01-29 DIAGNOSIS — E23.2 DI (DIABETES INSIPIDUS) (HCC): ICD-10-CM

## 2025-01-29 RX ORDER — DESMOPRESSIN ACETATE 0.1 MG/ML
1 SOLUTION NASAL EVERY EVENING
Qty: 5 ML | Refills: 0 | Status: SHIPPED | OUTPATIENT
Start: 2025-01-29

## 2025-01-29 NOTE — PROGRESS NOTES
Extensive discussion this AM of sodium level.  He would like to try DDAVP nasal spray.  I advised him he is at risk of seizure if he does not drink <8 bottles of h20 and do labs 3 days after doing this.  He agrees.  Home health nurse was there and witnessed this discussion.    Bea Mc MD  [facility]

## 2025-08-26 ENCOUNTER — APPOINTMENT (OUTPATIENT)
Facility: HOSPITAL | Age: 40
End: 2025-08-26
Payer: MEDICAID

## 2025-08-26 ENCOUNTER — HOSPITAL ENCOUNTER (EMERGENCY)
Facility: HOSPITAL | Age: 40
Discharge: HOME | End: 2025-08-26
Attending: EMERGENCY MEDICINE
Payer: MEDICAID

## 2025-08-26 VITALS
SYSTOLIC BLOOD PRESSURE: 186 MMHG | RESPIRATION RATE: 22 BRPM | TEMPERATURE: 98.2 F | BODY MASS INDEX: 28.69 KG/M2 | HEART RATE: 95 BPM | WEIGHT: 188.71 LBS | DIASTOLIC BLOOD PRESSURE: 128 MMHG | OXYGEN SATURATION: 100 %

## 2025-08-26 DIAGNOSIS — R10.9 CHRONIC ABDOMINAL PAIN: Primary | ICD-10-CM

## 2025-08-26 DIAGNOSIS — G89.29 CHRONIC ABDOMINAL PAIN: Primary | ICD-10-CM

## 2025-08-26 LAB
ALBUMIN SERPL-MCNC: 4.8 G/DL (ref 3.5–5.2)
ALBUMIN/GLOB SERPL: 1.1 (ref 1.1–2.2)
ALP SERPL-CCNC: 87 U/L (ref 40–129)
ALT SERPL-CCNC: 37 U/L (ref 10–50)
AMPHET UR QL SCN: NEGATIVE
ANION GAP SERPL CALC-SCNC: 18 MMOL/L (ref 2–14)
APPEARANCE UR: CLEAR
AST SERPL-CCNC: 53 U/L (ref 10–50)
BACTERIA URNS QL MICRO: NEGATIVE /HPF
BARBITURATES UR QL SCN: NEGATIVE
BASOPHILS # BLD: 0.03 K/UL (ref 0–0.1)
BASOPHILS NFR BLD: 0.4 % (ref 0–1)
BENZODIAZ UR QL: NEGATIVE
BILIRUB SERPL-MCNC: 0.6 MG/DL (ref 0–1.2)
BILIRUB UR QL: NEGATIVE
BUN SERPL-MCNC: 11 MG/DL (ref 6–20)
BUN/CREAT SERPL: 10 (ref 12–20)
CALCIUM SERPL-MCNC: 10.8 MG/DL (ref 8.6–10)
CANNABINOIDS UR QL SCN: POSITIVE
CHLORIDE SERPL-SCNC: 100 MMOL/L (ref 98–107)
CO2 SERPL-SCNC: 25 MMOL/L (ref 20–29)
COCAINE UR QL SCN: NEGATIVE
COLOR UR: ABNORMAL
COMMENT:: NORMAL
CREAT SERPL-MCNC: 1.14 MG/DL (ref 0.7–1.2)
DIFFERENTIAL METHOD BLD: ABNORMAL
EOSINOPHIL # BLD: 0.01 K/UL (ref 0–0.4)
EOSINOPHIL NFR BLD: 0.1 % (ref 0–7)
EPITH CASTS URNS QL MICRO: ABNORMAL /LPF
ERYTHROCYTE [DISTWIDTH] IN BLOOD BY AUTOMATED COUNT: 15.9 % (ref 11.5–14.5)
FENTANYL: NEGATIVE
GLOBULIN SER CALC-MCNC: 4.2 G/DL (ref 2–4)
GLUCOSE SERPL-MCNC: 110 MG/DL (ref 65–100)
GLUCOSE UR STRIP.AUTO-MCNC: NEGATIVE MG/DL
HCT VFR BLD AUTO: 48.3 % (ref 36.6–50.3)
HGB BLD-MCNC: 16.5 G/DL (ref 12.1–17)
HGB UR QL STRIP: ABNORMAL
IMM GRANULOCYTES # BLD AUTO: 0.04 K/UL (ref 0–0.04)
IMM GRANULOCYTES NFR BLD AUTO: 0.5 % (ref 0–0.5)
KETONES UR QL STRIP.AUTO: ABNORMAL MG/DL
LEUKOCYTE ESTERASE UR QL STRIP.AUTO: NEGATIVE
LIPASE SERPL-CCNC: 62 U/L (ref 13–60)
LYMPHOCYTES # BLD: 1.2 K/UL (ref 0.8–3.5)
LYMPHOCYTES NFR BLD: 14.2 % (ref 12–49)
Lab: ABNORMAL
MCH RBC QN AUTO: 31.3 PG (ref 26–34)
MCHC RBC AUTO-ENTMCNC: 34.2 G/DL (ref 30–36.5)
MCV RBC AUTO: 91.7 FL (ref 80–99)
METHADONE UR QL: NEGATIVE
MONOCYTES # BLD: 0.51 K/UL (ref 0–1)
MONOCYTES NFR BLD: 6 % (ref 5–13)
NEUTS SEG # BLD: 6.69 K/UL (ref 1.8–8)
NEUTS SEG NFR BLD: 78.8 % (ref 32–75)
NITRITE UR QL STRIP.AUTO: NEGATIVE
NRBC # BLD: 0 K/UL (ref 0–0.01)
NRBC BLD-RTO: 0 PER 100 WBC
OPIATES UR QL: NEGATIVE
PCP UR QL: NEGATIVE
PH UR STRIP: 7 (ref 5–8)
PLATELET # BLD AUTO: 369 K/UL (ref 150–400)
PMV BLD AUTO: 9.6 FL (ref 8.9–12.9)
POTASSIUM SERPL-SCNC: ABNORMAL MMOL/L (ref 3.5–5.1)
PROT SERPL-MCNC: 9 G/DL (ref 6.4–8.3)
PROT UR STRIP-MCNC: NEGATIVE MG/DL
RBC # BLD AUTO: 5.27 M/UL (ref 4.1–5.7)
RBC #/AREA URNS HPF: ABNORMAL /HPF (ref 0–5)
SODIUM SERPL-SCNC: 142 MMOL/L (ref 136–145)
SP GR UR REFRACTOMETRY: <1.005 (ref 1–1.03)
SPECIMEN HOLD: NORMAL
SPECIMEN HOLD: NORMAL
UROBILINOGEN UR QL STRIP.AUTO: 0.2 EU/DL (ref 0.2–1)
WBC # BLD AUTO: 8.5 K/UL (ref 4.1–11.1)
WBC URNS QL MICRO: ABNORMAL /HPF (ref 0–4)

## 2025-08-26 PROCEDURE — 6360000002 HC RX W HCPCS: Performed by: EMERGENCY MEDICINE

## 2025-08-26 PROCEDURE — 6370000000 HC RX 637 (ALT 250 FOR IP): Performed by: EMERGENCY MEDICINE

## 2025-08-26 PROCEDURE — 80053 COMPREHEN METABOLIC PANEL: CPT

## 2025-08-26 PROCEDURE — 6360000004 HC RX CONTRAST MEDICATION

## 2025-08-26 PROCEDURE — 74177 CT ABD & PELVIS W/CONTRAST: CPT

## 2025-08-26 PROCEDURE — 81001 URINALYSIS AUTO W/SCOPE: CPT

## 2025-08-26 PROCEDURE — 83690 ASSAY OF LIPASE: CPT

## 2025-08-26 PROCEDURE — 2580000003 HC RX 258: Performed by: EMERGENCY MEDICINE

## 2025-08-26 PROCEDURE — 99285 EMERGENCY DEPT VISIT HI MDM: CPT

## 2025-08-26 PROCEDURE — 96375 TX/PRO/DX INJ NEW DRUG ADDON: CPT

## 2025-08-26 PROCEDURE — 85025 COMPLETE CBC W/AUTO DIFF WBC: CPT

## 2025-08-26 PROCEDURE — 96374 THER/PROPH/DIAG INJ IV PUSH: CPT

## 2025-08-26 RX ORDER — KETOROLAC TROMETHAMINE 30 MG/ML
30 INJECTION, SOLUTION INTRAMUSCULAR; INTRAVENOUS
Status: COMPLETED | OUTPATIENT
Start: 2025-08-26 | End: 2025-08-26

## 2025-08-26 RX ORDER — ONDANSETRON 2 MG/ML
4 INJECTION INTRAMUSCULAR; INTRAVENOUS ONCE
Status: COMPLETED | OUTPATIENT
Start: 2025-08-26 | End: 2025-08-26

## 2025-08-26 RX ORDER — OXYCODONE AND ACETAMINOPHEN 5; 325 MG/1; MG/1
1 TABLET ORAL
Refills: 0 | Status: COMPLETED | OUTPATIENT
Start: 2025-08-26 | End: 2025-08-26

## 2025-08-26 RX ORDER — 0.9 % SODIUM CHLORIDE 0.9 %
1000 INTRAVENOUS SOLUTION INTRAVENOUS ONCE
Status: COMPLETED | OUTPATIENT
Start: 2025-08-26 | End: 2025-08-26

## 2025-08-26 RX ORDER — DICYCLOMINE HYDROCHLORIDE 10 MG/1
20 CAPSULE ORAL ONCE
Status: COMPLETED | OUTPATIENT
Start: 2025-08-26 | End: 2025-08-26

## 2025-08-26 RX ORDER — IOPAMIDOL 755 MG/ML
100 INJECTION, SOLUTION INTRAVASCULAR
Status: COMPLETED | OUTPATIENT
Start: 2025-08-26 | End: 2025-08-26

## 2025-08-26 RX ADMIN — IOPAMIDOL 100 ML: 755 INJECTION, SOLUTION INTRAVENOUS at 07:11

## 2025-08-26 RX ADMIN — KETOROLAC TROMETHAMINE 30 MG: 30 INJECTION, SOLUTION INTRAMUSCULAR at 07:53

## 2025-08-26 RX ADMIN — OXYCODONE AND ACETAMINOPHEN 1 TABLET: 325; 5 TABLET ORAL at 06:34

## 2025-08-26 RX ADMIN — ONDANSETRON 4 MG: 2 INJECTION, SOLUTION INTRAMUSCULAR; INTRAVENOUS at 06:34

## 2025-08-26 RX ADMIN — SODIUM CHLORIDE 1000 ML: 0.9 INJECTION, SOLUTION INTRAVENOUS at 06:33

## 2025-08-26 RX ADMIN — DICYCLOMINE HYDROCHLORIDE 20 MG: 10 CAPSULE ORAL at 06:57

## 2025-08-26 ASSESSMENT — PAIN DESCRIPTION - LOCATION
LOCATION: ABDOMEN

## 2025-08-26 ASSESSMENT — ENCOUNTER SYMPTOMS
ABDOMINAL PAIN: 1
VOMITING: 1
DIARRHEA: 0
NAUSEA: 1

## 2025-08-26 ASSESSMENT — PAIN DESCRIPTION - PAIN TYPE: TYPE: ACUTE PAIN

## 2025-08-26 ASSESSMENT — PAIN DESCRIPTION - ORIENTATION
ORIENTATION: RIGHT;UPPER
ORIENTATION: RIGHT;MID;UPPER

## 2025-08-26 ASSESSMENT — PAIN SCALES - GENERAL
PAINLEVEL_OUTOF10: 10
PAINLEVEL_OUTOF10: 0

## 2025-08-26 ASSESSMENT — PAIN DESCRIPTION - DESCRIPTORS
DESCRIPTORS: PATIENT UNABLE TO DESCRIBE
DESCRIPTORS: ACHING

## 2025-08-26 ASSESSMENT — PAIN - FUNCTIONAL ASSESSMENT
PAIN_FUNCTIONAL_ASSESSMENT: ACTIVITIES ARE NOT PREVENTED
PAIN_FUNCTIONAL_ASSESSMENT: 0-10

## (undated) DEVICE — NEEDLE HYPO 18GA L1.5IN PNK S STL HUB POLYPR SHLD REG BVL

## (undated) DEVICE — FORCEPS BX L240CM JAW DIA2.8MM L CAP W/ NDL MIC MESH TOOTH

## (undated) DEVICE — SOLIDIFIER FLUID 3000 CC ABSORB

## (undated) DEVICE — 1200 GUARD II KIT W/5MM TUBE W/O VAC TUBE: Brand: GUARDIAN

## (undated) DEVICE — CATH IV AUTOGRD BC BLU 22GA 25 -- INSYTE

## (undated) DEVICE — BAG SPEC BIOHZD LF 2MIL 6X10IN -- CONVERT TO ITEM 357326

## (undated) DEVICE — KENDALL RADIOLUCENT FOAM MONITORING ELECTRODE -RECTANGULAR SHAPE: Brand: KENDALL

## (undated) DEVICE — ENDO CARRY-ON PROCEDURE KIT INCLUDES ENZYMATIC SPONGE, GAUZE, BIOHAZARD LABEL, TRAY, LUBRICANT, DIRTY SCOPE LABEL, WATER LABEL, TRAY, DRAWSTRING PAD, AND DEFENDO 4-PIECE KIT.: Brand: ENDO CARRY-ON PROCEDURE KIT

## (undated) DEVICE — KIT IV STRT W CHLORAPREP PD 1ML

## (undated) DEVICE — SET ADMIN 16ML TBNG L100IN 2 Y INJ SITE IV PIGGY BK DISP

## (undated) DEVICE — SYRINGE MED 20ML STD CLR PLAS LUERLOCK TIP N CTRL DISP

## (undated) DEVICE — BAG BELONG PT PERS CLEAR HANDL

## (undated) DEVICE — CONTAINER SPEC 20 ML LID NEUT BUFF FORMALIN 10 % POLYPR STS

## (undated) DEVICE — SET EXTN TBNG L BOR 4 W STPCOCK ST 32IN PRIMING VOL 6ML

## (undated) DEVICE — BW-412T DISP COMBO CLEANING BRUSH: Brand: SINGLE USE COMBINATION CLEANING BRUSH

## (undated) DEVICE — TUBING HYDR IRR --

## (undated) DEVICE — CANN NASAL O2 CAPNOGRAPHY AD -- FILTERLINE